# Patient Record
Sex: MALE | Race: WHITE | Employment: FULL TIME | URBAN - METROPOLITAN AREA
[De-identification: names, ages, dates, MRNs, and addresses within clinical notes are randomized per-mention and may not be internally consistent; named-entity substitution may affect disease eponyms.]

---

## 2017-04-13 ENCOUNTER — OFFICE VISIT (OUTPATIENT)
Dept: FAMILY MEDICINE | Facility: CLINIC | Age: 67
End: 2017-04-13
Payer: COMMERCIAL

## 2017-04-13 VITALS
WEIGHT: 250 LBS | DIASTOLIC BLOOD PRESSURE: 76 MMHG | HEIGHT: 69 IN | HEART RATE: 90 BPM | BODY MASS INDEX: 37.03 KG/M2 | SYSTOLIC BLOOD PRESSURE: 126 MMHG | TEMPERATURE: 97.4 F

## 2017-04-13 DIAGNOSIS — R60.0 SALIVARY GLAND SWELLING: Primary | ICD-10-CM

## 2017-04-13 PROCEDURE — 99213 OFFICE O/P EST LOW 20 MIN: CPT | Performed by: FAMILY MEDICINE

## 2017-04-13 NOTE — PROGRESS NOTES
SUBJECTIVE:                                                    Fco Arroyo is a 67 year old male who presents to clinic today for the following health issues:      MOUTH PROBLEM      Duration: Noticed 3-4 days ago, symptoms are getting worse.    Description (location/character/radiation): Right side mouth pain, not able to open his mouth as far as he normally can.  He states he does have a bad tooth on the bottom right side. He thinks he has an infected tooth on the right lower side. He has had several issues in that area.     Intensity:  moderate    Accompanying signs and symptoms: Pain radiating to the lower jaw, throat area.  Has noticed some chills, no fever.     History (similar episodes/previous evaluation): None    Precipitating or alleviating factors: Trying to open his mouth.    Therapies tried and outcome: Aleve as needed.  Nothing yet today.       Current Outpatient Prescriptions:      lisinopril (PRINIVIL,ZESTRIL) 40 MG tablet, Take 1 tablet (40 mg) by mouth daily, Disp: 90 tablet, Rfl: 3     hydrochlorothiazide (MICROZIDE) 12.5 MG capsule, Take 1 capsule (12.5 mg) by mouth every morning, Disp: 90 capsule, Rfl: 3     simvastatin (ZOCOR) 40 MG tablet, Take 1 tablet (40 mg) by mouth At Bedtime, Disp: 90 tablet, Rfl: 3     metFORMIN (GLUCOPHAGE-XR) 500 MG 24 hr tablet, Take 2 tablets (1,000 mg) by mouth 2 times daily (with meals), Disp: 120 tablet, Rfl: 3     glipiZIDE (GLIPIZIDE XL) 2.5 MG 24 hr tablet, Take 1 tablet (2.5 mg) by mouth daily, Disp: 90 tablet, Rfl: 3     blood glucose monitoring (ACCU-CHEK SMARTVIEW) test strip, Use to test blood sugar 2 times daily or as directed., Disp: 200 each, Rfl: 3     blood glucose (ACCU-CHEK SMARTVIEW) test strip, Use to test blood sugar 1 times daily or as directed., Disp: 50 strip, Rfl: 12     blood glucose monitoring (ACCU-CHEK FASTCLIX) lancets, 1 each daily Use to test blood sugar 1 times daily or as directed., Disp: 102 Box, Rfl: 12     cinnamon 500 MG  "CAPS, Take  by mouth., Disp: , Rfl:      VITAMIN C 500 MG PO TABS, 1 TABLET  DAILY, Disp: , Rfl:      VITAMIN E, daily, Disp: , Rfl:      ASPIRIN 81 MG OR TABS, 1 TABLET DAILY, Disp: , Rfl:     Patient Active Problem List   Diagnosis     R PLANTAR FIBROMATOSIS     Sensorineural hearing loss, bilateral     Hyperlipidemia LDL goal <100     Advanced directives, counseling/discussion     Morbid obesity (H)     Benign essential hypertension     Type 2 diabetes mellitus without complication, without long-term current use of insulin (H)       Blood pressure 126/76, pulse 90, temperature 97.4  F (36.3  C), temperature source Tympanic, height 5' 8.8\" (1.748 m), weight 250 lb (113.4 kg).    Exam:  GENERAL APPEARANCE: healthy, alert and no distress  EYES: EOMI,  PERRL  HENT: ear canals and TM's normal and nose and mouth without ulcers or lesions  HENT: the right submandibular gland is tender and swollen. The teeth and gums are not tender or swollen.   There are no swollen lymph nodes in the neck or under the jaw. The pharynx is not red, and there are no exudates.   The teeth are not tender to palpation or chewing on the tongue blade.   NECK: no adenopathy, no asymmetry, masses, or scars and thyroid normal to palpation  SKIN: no suspicious lesions or rashes  PSYCH: mentation appears normal and affect normal/bright      (K11.1) Salivary gland swelling  (primary encounter diagnosis)  Comment:   Plan: amoxicillin-clavulanate (AUGMENTIN) 875-125 MG         per tablet, OTOLARYNGOLOGY REFERRAL        There could be an infection on the right submandibular saliva gland. Use the fluids and mucus thinner, as in Mucinex.   Consider hot packs on the area for 1 minute three times daily. Then such on lemon drops for increased saliva to push a possible  Plug out. Take Augmentin at 875 mg twice daily for 10 days. Call if any side effects, or if not better. The ENT referral is done and call [phone] for the  appt if not better. Eat soft foods. " Tylenol and advil may be used.     Nathanael Andrew

## 2017-04-13 NOTE — MR AVS SNAPSHOT
After Visit Summary   4/13/2017    Fco Arroyo    MRN: [id number]           Patient Information     Date Of Birth          1950        Visit Information        Provider Department      4/13/2017 11:00 AM Nathanael Andrew MD [facility]        Today's Diagnoses     Salivary gland swelling    -  1      Care Instructions          Thank you for choosing [facility].  You may be receiving a survey in the mail from [facility] regarding your visit today.  Please take a few minutes to complete and return the survey to let us know how we are doing.      If you have questions or concerns, please contact us via [employer] or you can contact your care team at [phone].    Our Clinic hours are:  Monday 6:40 am  to 7:00 pm  Tuesday -Friday 6:40 am to 5:00 pm    The [place] outpatient lab hours are:  Monday - Friday 6:10 am to 4:45 pm  Saturdays 7:00 am to 11:00 am  Appointments are required, call [phone]    If you have clinical questions after hours or would like to schedule an appointment,  call the clinic at [phone].    (K11.1) Salivary gland swelling  (primary encounter diagnosis)  Comment:   Plan: amoxicillin-clavulanate (AUGMENTIN) 875-125 MG         per tablet, OTOLARYNGOLOGY REFERRAL        There could be an infection on the right submandibular saliva gland. Use the fluids and mucus thinner, as in Mucinex.   Consider hot packs on the area for 1 minute three times daily. Then such on lemon drops for increased saliva to push a possible  Plug out. Take Augmentin at 875 mg twice daily for 10 days. Call if any side effects, or if not better. The ENT referral is done and call [phone] for the  appt if not better. Eat soft foods. Tylenol and advil may be used.         Follow-ups after your visit        Additional Services     OTOLARYNGOLOGY REFERRAL       Your provider has referred you to: [facility]: [facility] [place] [phone]    "[email or web address]    Please be aware that coverage of these services is subject to the terms and limitations of your health insurance plan.  Call member services at your health plan with any benefit or coverage questions.      Please bring the following with you to your appointment:    (1) Any X-Rays, CTs or MRIs which have been performed.  Contact the facility where they were done to arrange for  prior to your scheduled appointment.   (2) List of current medications  (3) This referral request   (4) Any documents/labs given to you for this referral                  Who to contact     If you have questions or need follow up information about today's clinic visit or your schedule please contact [facility] directly at [phone].  Normal or non-critical lab and imaging results will be communicated to you by MyChart, letter or phone within 4 business days after the clinic has received the results. If you do not hear from us within 7 days, please contact the clinic through MyChart or phone. If you have a critical or abnormal lab result, we will notify you by phone as soon as possible.  Submit refill requests through [employer] or call your pharmacy and they will forward the refill request to us. Please allow 3 business days for your refill to be completed.          Additional Information About Your Visit        [employer] Information     [employer] lets you send messages to your doctor, view your test results, renew your prescriptions, schedule appointments and more. To sign up, go to [email or web address] . Click on \"Log in\" on the left side of the screen, which will take you to the Welcome page. Then click on \"Sign up Now\" on the right side of the page.     You will be asked to enter the access code listed below, as well as some personal information. Please follow the directions to create your username and password.     Your access code is: [email or web address]  Expires: 7/12/2017 12:09 " "PM     Your access code will  in 90 days. If you need help or a new code, please call your [facility] or [phone].        Care EveryWhere ID     This is your Care EveryWhere ID. This could be used by other organizations to access your [place] medical records  [id number]        Your Vitals Were     Pulse Temperature Height BMI (Body Mass Index)          90 97.4  F (36.3  C) (Tympanic) 5' 8.8\" (1.748 m) 37.13 kg/m2         Blood Pressure from Last 3 Encounters:   17 126/76   16 128/67   16 125/75    Weight from Last 3 Encounters:   17 250 lb (113.4 kg)   16 254 lb (115.2 kg)   12/07/15 256 lb (116.1 kg)              We Performed the Following     OTOLARYNGOLOGY REFERRAL          Today's Medication Changes          These changes are accurate as of: 17 12:09 PM.  If you have any questions, ask your nurse or doctor.               Start taking these medicines.        Dose/Directions    amoxicillin-clavulanate 875-125 MG per tablet   Commonly known as:  AUGMENTIN   Used for:  Salivary gland swelling   Started by:  Nathanael Andrew MD        Dose:  1 tablet   Take 1 tablet by mouth 2 times daily for 10 days   Quantity:  20 tablet   Refills:  0            Where to get your medicines      These medications were sent to [facility] [address] [place] [address] [address]   [address] [facility] [address]     Phone:  [phone]     amoxicillin-clavulanate 875-125 MG per tablet                Primary Care Provider Office Phone # Fax #    Virginie Lan [phone]       [facility] [address] [facility] [address]        Thank you!     Thank you for choosing [facility]  for your care. Our goal is always to provide you with excellent care. Hearing back from our patients is one way we can continue to improve our services. Please take a few minutes to complete the written survey that you may receive " in the mail after your visit with us. Thank you!             Your Updated Medication List - Protect others around you: Learn how to safely use, store and throw away your medicines at www.disposemymeds.org.          This list is accurate as of: 4/13/17 12:09 PM.  Always use your most recent med list.                   Brand Name Dispense Instructions for use    amoxicillin-clavulanate 875-125 MG per tablet    AUGMENTIN    20 tablet    Take 1 tablet by mouth 2 times daily for 10 days       ascorbic acid 500 MG tablet    VITAMIN C     1 TABLET  DAILY       aspirin 81 MG tablet      1 TABLET DAILY       blood glucose monitoring lancets     102 Box    1 each daily Use to test blood sugar 1 times daily or as directed.       * blood glucose monitoring test strip    ACCU-CHEK SMARTVIEW    50 strip    Use to test blood sugar 1 times daily or as directed.       * blood glucose monitoring test strip    ACCU-CHEK SMARTVIEW    200 each    Use to test blood sugar 2 times daily or as directed.       cinnamon 500 MG Caps      Take  by mouth.       glipiZIDE 2.5 MG 24 hr tablet    glipiZIDE XL    90 tablet    Take 1 tablet (2.5 mg) by mouth daily       hydrochlorothiazide 12.5 MG capsule    MICROZIDE    90 capsule    Take 1 capsule (12.5 mg) by mouth every morning       lisinopril 40 MG tablet    PRINIVIL/ZESTRIL    90 tablet    Take 1 tablet (40 mg) by mouth daily       metFORMIN 500 MG 24 hr tablet    GLUCOPHAGE-XR    120 tablet    Take 2 tablets (1,000 mg) by mouth 2 times daily (with meals)       simvastatin 40 MG tablet    ZOCOR    90 tablet    Take 1 tablet (40 mg) by mouth At Bedtime       VITAMIN E      daily       * Notice:  This list has 2 medication(s) that are the same as other medications prescribed for you. Read the directions carefully, and ask your doctor or other care provider to review them with you.

## 2017-04-13 NOTE — PATIENT INSTRUCTIONS
Thank you for choosing [facility].  You may be receiving a survey in the mail from [facility] regarding your visit today.  Please take a few minutes to complete and return the survey to let us know how we are doing.      If you have questions or concerns, please contact us via [employer] or you can contact your care team at [phone].    Our Clinic hours are:  Monday 6:40 am  to 7:00 pm  Tuesday -Friday 6:40 am to 5:00 pm    The [place] outpatient lab hours are:  Monday - Friday 6:10 am to 4:45 pm  Saturdays 7:00 am to 11:00 am  Appointments are required, call [phone]    If you have clinical questions after hours or would like to schedule an appointment,  call the clinic at [phone].    (K11.1) Salivary gland swelling  (primary encounter diagnosis)  Comment:   Plan: amoxicillin-clavulanate (AUGMENTIN) 875-125 MG         per tablet, OTOLARYNGOLOGY REFERRAL        There could be an infection on the right submandibular saliva gland. Use the fluids and mucus thinner, as in Mucinex.   Consider hot packs on the area for 1 minute three times daily. Then such on lemon drops for increased saliva to push a possible  Plug out. Take Augmentin at 875 mg twice daily for 10 days. Call if any side effects, or if not better. The ENT referral is done and call [phone] for the  appt if not better. Eat soft foods. Tylenol and advil may be used.

## 2017-04-13 NOTE — NURSING NOTE
"Chief Complaint   Patient presents with     Mouth Problem     Here with right side mouth pain, not able to open mouth as far.       Initial /76  Pulse 90  Temp 97.4  F (36.3  C) (Tympanic)  Ht 5' 8.8\" (1.748 m)  Wt 250 lb (113.4 kg)  BMI 37.13 kg/m2 Estimated body mass index is 37.13 kg/(m^2) as calculated from the following:    Height as of this encounter: 5' 8.8\" (1.748 m).    Weight as of this encounter: 250 lb (113.4 kg).  Medication Reconciliation: complete  "

## 2017-04-24 ENCOUNTER — OFFICE VISIT (OUTPATIENT)
Dept: OTOLARYNGOLOGY | Facility: CLINIC | Age: 67
End: 2017-04-24
Payer: COMMERCIAL

## 2017-04-24 VITALS — HEART RATE: 88 BPM | SYSTOLIC BLOOD PRESSURE: 150 MMHG | RESPIRATION RATE: 14 BRPM | DIASTOLIC BLOOD PRESSURE: 91 MMHG

## 2017-04-24 DIAGNOSIS — G50.0 FACIAL PAIN SYNDROME: Primary | ICD-10-CM

## 2017-04-24 PROCEDURE — 99203 OFFICE O/P NEW LOW 30 MIN: CPT | Performed by: OTOLARYNGOLOGY

## 2017-04-24 NOTE — PROGRESS NOTES
History of Present Illness - Fco Arroyo is a 67 year old male who presents with right mouth pain and difficulty opening his mouth widely. He feels that he does have some bad teeth on the right, but he has not discussed this with a dentist yet. Dr. Andrew felt that his right submandibular gland was swollen on 4/13/17, and put him on Augmentin.  He didn't really notice the swelling or any redness of the overlying skin, but was most bothered by the restriction of his jaw movement.    Past Medical History -   Patient Active Problem List   Diagnosis     R PLANTAR FIBROMATOSIS     Sensorineural hearing loss, bilateral     Hyperlipidemia LDL goal <100     Advanced directives, counseling/discussion     Morbid obesity (H)     Benign essential hypertension     Type 2 diabetes mellitus without complication, without long-term current use of insulin (H)       Current Medications -   Current Outpatient Prescriptions:      lisinopril (PRINIVIL,ZESTRIL) 40 MG tablet, Take 1 tablet (40 mg) by mouth daily, Disp: 90 tablet, Rfl: 3     hydrochlorothiazide (MICROZIDE) 12.5 MG capsule, Take 1 capsule (12.5 mg) by mouth every morning, Disp: 90 capsule, Rfl: 3     simvastatin (ZOCOR) 40 MG tablet, Take 1 tablet (40 mg) by mouth At Bedtime, Disp: 90 tablet, Rfl: 3     metFORMIN (GLUCOPHAGE-XR) 500 MG 24 hr tablet, Take 2 tablets (1,000 mg) by mouth 2 times daily (with meals), Disp: 120 tablet, Rfl: 3     glipiZIDE (GLIPIZIDE XL) 2.5 MG 24 hr tablet, Take 1 tablet (2.5 mg) by mouth daily, Disp: 90 tablet, Rfl: 3     blood glucose monitoring (ACCU-CHEK SMARTVIEW) test strip, Use to test blood sugar 2 times daily or as directed., Disp: 200 each, Rfl: 3     blood glucose (ACCU-CHEK SMARTVIEW) test strip, Use to test blood sugar 1 times daily or as directed., Disp: 50 strip, Rfl: 12     blood glucose monitoring (ACCU-CHEK FASTCLIX) lancets, 1 each daily Use to test blood sugar 1 times daily or as directed., Disp: 102 Box, Rfl: 12      cinnamon 500 MG CAPS, Take  by mouth., Disp: , Rfl:      VITAMIN C 500 MG PO TABS, 1 TABLET  DAILY, Disp: , Rfl:      VITAMIN E, daily, Disp: , Rfl:      ASPIRIN 81 MG OR TABS, 1 TABLET DAILY, Disp: , Rfl:     Allergies -   Allergies   Allergen Reactions     Morphine GI Disturbance       Social History -   Social History     Social History     Marital status:      Spouse name: N/A     Number of children: N/A     Years of education: N/A     Social History Main Topics     Smoking status: Former Smoker     Types: Cigarettes     Smokeless tobacco: Never Used     Alcohol use Yes      Comment: little     Drug use: No     Sexual activity: Not on file     Other Topics Concern     Parent/Sibling W/ Cabg, Mi Or Angioplasty Before 65f 55m? Yes     Social History Narrative       Family History -   Family History   Problem Relation Age of Onset     DIABETES Mother      Hypertension Mother      Hypertension Father      HEART DISEASE Brother      MI x2, CABG, 1st MI at 49-51 yo.      Hypertension Brother      C.A.D. Brother      Alzheimer Disease Brother      DIABETES Sister       maybe       Review of Systems - As per HPI and PMHx, otherwise 7 system review of the head and neck negative. 10+ system review negative.    Physical Exam  BP (!) 150/91 (BP Location: Right arm, Patient Position: Chair, Cuff Size: Adult Large)  Pulse 88  Resp 14  General - The patient is well nourished and well developed, and appears to have good nutritional status.  Alert and oriented to person and place, answers questions and cooperates with examination appropriately.   Head and Face - Normocephalic and atraumatic, with no gross asymmetry noted of the contour of the facial features.  The facial nerve is intact, with strong symmetric movements.  Voice and Breathing - The patient was breathing comfortably without the use of accessory muscles. There was no wheezing, stridor, or stertor.  The patients voice was clear and strong, and had  appropriate pitch and quality.  Ears - Bilateral pinna and EACs with normal appearing overlying skin. Tympanic membrane intact with good mobility on pneumatic otoscopy bilaterally. Bony landmarks of the ossicular chain are normal. The tympanic membranes are normal in appearance. No retraction, perforation, or masses.  No fluid or purulence was seen in the external canal or the middle ear.   Eyes - Extraocular movements intact.  Sclera were not icteric or injected, conjunctiva were pink and moist.  Mouth - Examination of the oral cavity showed pink, healthy oral mucosa. No pus or palpable stone in either submandibular duct. Poor dentition. Mildly more tender to palpation of the right TMJ.  Throat - The walls of the oropharynx were smooth, pink, moist, symmetric, and had no lesions or ulcerations.  The tonsillar pillars and soft palate were symmetric.  The uvula was midline on elevation.  Neck - Normal midline excursion of the laryngotracheal complex during swallowing.  Full range of motion on passive movement.  Palpation of the occipital, submental, submandibular, internal jugular chain, and supraclavicular nodes did not demonstrate any abnormal lymph nodes or masses.  The carotid pulse was palpable bilaterally.  Palpation of the thyroid was soft and smooth, with no nodules or goiter appreciated.  The trachea was mobile and midline.  Nose - External contour is symmetric, no gross deflection or scars.  Nasal mucosa is pink and moist with no abnormal mucus.  The septum was midline and non-obstructive, turbinates of normal size and position.  No polyps, masses, or purulence noted on examination.          Assessment - Fco Arroyo is a 67 year old male with normal salivary glands on exam today. Given the lack of notable swelling by the patient, I suspect this is likely a consequence of TMJ rather than sialadenitis. I recommended he f/u with a dentist to have his poor dentition evaluated, as well as consider options  to manage future TMJ symptoms.       Dr. Gary Weaver MD  Otolaryngology  [facility]

## 2017-05-10 ENCOUNTER — OFFICE VISIT (OUTPATIENT)
Dept: FAMILY MEDICINE | Facility: CLINIC | Age: 67
End: 2017-05-10
Payer: COMMERCIAL

## 2017-05-10 ENCOUNTER — TELEPHONE (OUTPATIENT)
Dept: FAMILY MEDICINE | Facility: CLINIC | Age: 67
End: 2017-05-10

## 2017-05-10 VITALS
TEMPERATURE: 97.6 F | WEIGHT: 249 LBS | BODY MASS INDEX: 36.88 KG/M2 | DIASTOLIC BLOOD PRESSURE: 74 MMHG | SYSTOLIC BLOOD PRESSURE: 120 MMHG | HEIGHT: 69 IN

## 2017-05-10 DIAGNOSIS — E11.9 TYPE 2 DIABETES MELLITUS WITHOUT COMPLICATION, WITHOUT LONG-TERM CURRENT USE OF INSULIN (H): ICD-10-CM

## 2017-05-10 DIAGNOSIS — R42 DIZZINESS: Primary | ICD-10-CM

## 2017-05-10 LAB
ANION GAP SERPL CALCULATED.3IONS-SCNC: 9 MMOL/L (ref 3–14)
BUN SERPL-MCNC: 15 MG/DL (ref 7–30)
CALCIUM SERPL-MCNC: 9.6 MG/DL (ref 8.5–10.1)
CHLORIDE SERPL-SCNC: 100 MMOL/L (ref 94–109)
CO2 SERPL-SCNC: 30 MMOL/L (ref 20–32)
CREAT SERPL-MCNC: 0.87 MG/DL (ref 0.66–1.25)
CREAT UR-MCNC: 98 MG/DL
ERYTHROCYTE [DISTWIDTH] IN BLOOD BY AUTOMATED COUNT: 13.3 % (ref 10–15)
GFR SERPL CREATININE-BSD FRML MDRD: 87 ML/MIN/1.7M2
GLUCOSE SERPL-MCNC: 179 MG/DL (ref 70–99)
HBA1C MFR BLD: 6.6 % (ref 4.3–6)
HCT VFR BLD AUTO: 47.5 % (ref 40–53)
HGB BLD-MCNC: 16.5 G/DL (ref 13.3–17.7)
MCH RBC QN AUTO: 31.7 PG (ref 26.5–33)
MCHC RBC AUTO-ENTMCNC: 34.7 G/DL (ref 31.5–36.5)
MCV RBC AUTO: 91 FL (ref 78–100)
MICROALBUMIN UR-MCNC: 6 MG/L
MICROALBUMIN/CREAT UR: 6.41 MG/G CR (ref 0–17)
PLATELET # BLD AUTO: 188 10E9/L (ref 150–450)
POTASSIUM SERPL-SCNC: 4.2 MMOL/L (ref 3.4–5.3)
RBC # BLD AUTO: 5.2 10E12/L (ref 4.4–5.9)
SODIUM SERPL-SCNC: 139 MMOL/L (ref 133–144)
WBC # BLD AUTO: 7.4 10E9/L (ref 4–11)

## 2017-05-10 PROCEDURE — 36415 COLL VENOUS BLD VENIPUNCTURE: CPT | Performed by: FAMILY MEDICINE

## 2017-05-10 PROCEDURE — 80048 BASIC METABOLIC PNL TOTAL CA: CPT | Performed by: FAMILY MEDICINE

## 2017-05-10 PROCEDURE — 83036 HEMOGLOBIN GLYCOSYLATED A1C: CPT | Performed by: FAMILY MEDICINE

## 2017-05-10 PROCEDURE — 82043 UR ALBUMIN QUANTITATIVE: CPT | Performed by: FAMILY MEDICINE

## 2017-05-10 PROCEDURE — 99214 OFFICE O/P EST MOD 30 MIN: CPT | Mod: 25 | Performed by: FAMILY MEDICINE

## 2017-05-10 PROCEDURE — 85027 COMPLETE CBC AUTOMATED: CPT | Performed by: FAMILY MEDICINE

## 2017-05-10 RX ORDER — ONDANSETRON 4 MG/1
4 TABLET, FILM COATED ORAL EVERY 8 HOURS PRN
Qty: 18 TABLET | Refills: 0 | Status: SHIPPED | OUTPATIENT
Start: 2017-05-10 | End: 2017-12-28

## 2017-05-10 RX ORDER — MECLIZINE HYDROCHLORIDE 25 MG/1
25 TABLET ORAL EVERY 6 HOURS PRN
Qty: 30 TABLET | Refills: 1 | Status: SHIPPED | OUTPATIENT
Start: 2017-05-10 | End: 2017-12-28

## 2017-05-10 NOTE — PROGRESS NOTES
Please inform patient that test result was within normal parameters.  His A1C remained unchanged from last check. All his other labs were within normal limits.  Thank you.     Virginie Lan M.D.

## 2017-05-10 NOTE — PROGRESS NOTES
SUBJECTIVE:                                                    Fco Arroyo is a 67 year old male who presents to clinic today for the following health issues:      Dizziness     Onset: 1 week    Description:   Do you feel faint:  no   Does it feel like the surroundings (bed, room) are moving: YES  Unsteady/off balance: YES  Have you passed out or fallen: no     Intensity: moderate    Progression of Symptoms:  worsening and constant    Accompanying Signs & Symptoms:  Heart palpitations: no   Nausea, vomiting: YES- nausea  Weakness in arms or legs: Yes, Legs  Fatigue: no   Vision or speech changes: no   Ringing in ears (Tinnitus): no   Hearing Loss: no    History:   Head trauma/concussion hx: no   Previous similar symptoms: no   Recent bleeding history: no     Precipitating factors:   Worse with activity or head movement: YES  Any new medications (BP?): Pt takes BP medication but it has not changed.  Alcohol/drug abuse/withdrawal: no     Alleviating factors:   Does staying in a fixed position give relief:  YES       Therapies Tried and outcome: None      History as above. Patient reports that he has had some dizziness , first incidence happened last week , he says once he got out of bed he started feeling like his balance was off . He had no chest pain or shortness of breath. He denies any eye symptoms. No headaches or feeling light headed he seemed to do okay that day and was fine for the next two days. Symptoms returned again today and he says he feels like his balance his off again. He denies any headaches today. Moving his  or changing position tends to aggravate this.No relieving factors. He denies any sinus pressure or nasal congestion .    Problem list and histories reviewed & adjusted, as indicated.  Additional history: as documented    Patient Active Problem List   Diagnosis     R PLANTAR FIBROMATOSIS     Sensorineural hearing loss, bilateral     Hyperlipidemia LDL goal <100     Advanced directives,  counseling/discussion     Morbid obesity (H)     Benign essential hypertension     Type 2 diabetes mellitus without complication, without long-term current use of insulin (H)     Past Surgical History:   Procedure Laterality Date     SURGICAL HISTORY OF -   1998    open cholecystectomy for gallstones s/p ERCP     SURGICAL HISTORY OF -   1997    cystoscopy     SURGICAL HISTORY OF -   11/1997    Urethral stricture       Social History   Substance Use Topics     Smoking status: Former Smoker     Types: Cigarettes     Smokeless tobacco: Never Used     Alcohol use Yes      Comment: little     Family History   Problem Relation Age of Onset     DIABETES Mother      Hypertension Mother      Hypertension Father      HEART DISEASE Brother      MI x2, CABG, 1st MI at 49-49 yo.      Hypertension Brother      C.A.D. Brother      Alzheimer Disease Brother      DIABETES Sister       maybe         Current Outpatient Prescriptions   Medication Sig Dispense Refill     ondansetron (ZOFRAN) 4 MG tablet Take 1 tablet (4 mg) by mouth every 8 hours as needed 18 tablet 0     meclizine (ANTIVERT) 25 MG tablet Take 1 tablet (25 mg) by mouth every 6 hours as needed for dizziness 30 tablet 1     lisinopril (PRINIVIL,ZESTRIL) 40 MG tablet Take 1 tablet (40 mg) by mouth daily 90 tablet 3     hydrochlorothiazide (MICROZIDE) 12.5 MG capsule Take 1 capsule (12.5 mg) by mouth every morning 90 capsule 3     simvastatin (ZOCOR) 40 MG tablet Take 1 tablet (40 mg) by mouth At Bedtime 90 tablet 3     metFORMIN (GLUCOPHAGE-XR) 500 MG 24 hr tablet Take 2 tablets (1,000 mg) by mouth 2 times daily (with meals) 120 tablet 3     glipiZIDE (GLIPIZIDE XL) 2.5 MG 24 hr tablet Take 1 tablet (2.5 mg) by mouth daily 90 tablet 3     blood glucose monitoring (ACCU-CHEK SMARTVIEW) test strip Use to test blood sugar 2 times daily or as directed. 200 each 3     blood glucose (ACCU-CHEK SMARTVIEW) test strip Use to test blood sugar 1 times daily or as directed. 50 strip  "12     blood glucose monitoring (ACCU-CHEK FASTCLIX) lancets 1 each daily Use to test blood sugar 1 times daily or as directed. 102 Box 12     cinnamon 500 MG CAPS Take  by mouth.       VITAMIN C 500 MG PO TABS 1 TABLET  DAILY       VITAMIN E daily       ASPIRIN 81 MG OR TABS 1 TABLET DAILY       Allergies   Allergen Reactions     Morphine GI Disturbance       Reviewed and updated as needed this visit by clinical staff       Reviewed and updated as needed this visit by Provider         ROS:  Constitutional, HEENT, cardiovascular, pulmonary, gi and gu systems are negative, except as otherwise noted.    OBJECTIVE:                                                    /74  Temp 97.6  F (36.4  C) (Tympanic)  Ht 5' 8.5\" (1.74 m)  Wt 249 lb (112.9 kg)  BMI 37.31 kg/m2  Body mass index is 37.31 kg/(m^2).  GENERAL: healthy, alert and no distress  EYES: Eyes grossly normal to inspection, PERRL and conjunctivae and sclerae normal  HENT: ear canals and TM's normal, nose and mouth without ulcers or lesions  NECK: no adenopathy, no asymmetry, masses, or scars and thyroid normal to palpation  RESP: lungs clear to auscultation - no rales, rhonchi or wheezes  CV: regular rate and rhythm, normal S1 S2, no S3 or S4, no murmur, click or rub, no peripheral edema and peripheral pulses strong  MS: no gross musculoskeletal defects noted, no edema  NEURO: Normal strength and tone, sensory exam grossly normal, mentation intact, speech normal, cranial nerves 2-12 intact and gait abnormal:     Diagnostic Test Results:  Results for orders placed or performed in visit on 05/10/17   CBC with platelets   Result Value Ref Range    WBC 7.4 4.0 - 11.0 10e9/L    RBC Count 5.20 4.4 - 5.9 10e12/L    Hemoglobin 16.5 13.3 - 17.7 g/dL    Hematocrit 47.5 40.0 - 53.0 %    MCV 91 78 - 100 fl    MCH 31.7 26.5 - 33.0 pg    MCHC 34.7 31.5 - 36.5 g/dL    RDW 13.3 10.0 - 15.0 %    Platelet Count 188 150 - 450 10e9/L   Basic metabolic panel   Result Value " Ref Range    Sodium 139 133 - 144 mmol/L    Potassium 4.2 3.4 - 5.3 mmol/L    Chloride 100 94 - 109 mmol/L    Carbon Dioxide 30 20 - 32 mmol/L    Anion Gap 9 3 - 14 mmol/L    Glucose 179 (H) 70 - 99 mg/dL    Urea Nitrogen 15 7 - 30 mg/dL    Creatinine 0.87 0.66 - 1.25 mg/dL    GFR Estimate 87 >60 mL/min/1.7m2    GFR Estimate If Black >90   GFR Calc   >60 mL/min/1.7m2    Calcium 9.6 8.5 - 10.1 mg/dL   Hemoglobin A1c   Result Value Ref Range    Hemoglobin A1C 6.6 (H) 4.3 - 6.0 %   Albumin Random Urine Quantitative   Result Value Ref Range    Creatinine Urine 98 mg/dL    Albumin Urine mg/L 6 mg/L    Albumin Urine mg/g Cr 6.41 0 - 17 mg/g Cr        ASSESSMENT/PLAN:                                                    (R42) Dizziness  (primary encounter diagnosis)  Comment: Discussed causes of dizziness with patient asked that he try the medication if no improvement may need physical therapy appointment .   Plan: ondansetron (ZOFRAN) 4 MG tablet, meclizine         (ANTIVERT) 25 MG tablet, CBC with platelets,         Basic metabolic panel      (E11.9) Type 2 diabetes mellitus without complication, without long-term current use of insulin (H)  Comment: A1C is still the dame  Plan: Hemoglobin A1c, Albumin Random Urine         Quantitative         FUTURE APPOINTMENTS:       - Follow-up visit as needed.    Virginie Lan MD  [facility]

## 2017-05-10 NOTE — MR AVS SNAPSHOT
After Visit Summary   5/10/2017    Fco Arroyo    MRN: [id number]           Patient Information     Date Of Birth          1950        Visit Information        Provider Department      5/10/2017 11:20 AM Virginie Lan MD [facility]        Today's Diagnoses     Dizziness    -  1      Care Instructions          Thank you for choosing [facility].  You may be receiving a survey in the mail from [facility] regarding your visit today.  Please take a few minutes to complete and return the survey to let us know how we are doing.      If you have questions or concerns, please contact us via [employer] or you can contact your care team at [phone].    Our Clinic hours are:  Monday 6:40 am  to 7:00 pm  Tuesday -Friday 6:40 am to 5:00 pm    The [place] outpatient lab hours are:  Monday - Friday 6:10 am to 4:45 pm  Saturdays 7:00 am to 11:00 am  Appointments are required, call [phone]    If you have clinical questions after hours or would like to schedule an appointment,  call the clinic at [phone].    Managing Dizziness (Vertigo) with Medications  Although medications can't cure your problem, they can help control symptoms. Your doctor may prescribe medications for a few weeks and then taper them off.  If you have side effects from your medications, contact your healthcare provider right away.   How medications can help      Treat infection or inflammation. If you have a bacterial infection, your doctor can prescribe antibiotics.    Limit conflicting balance signals. These medications are often in pill form.    Ease nausea. Suppositories, pills, or shots may be used to reduce vomiting.    Reduce pressure in the canals. Diuretics can be used to treat Meniere's disease. These medications help rid the body of excess fluid.    Ease other symptoms. Other medications can help ease depression and anxiety caused by living with dizziness or fainting.    8246-7908 The  "[employer]. [address] [place] [address], [place] [address]. All rights reserved. This information is not intended as a substitute for professional medical care. Always follow your healthcare professional's instructions.              Follow-ups after your visit        Who to contact     If you have questions or need follow up information about today's clinic visit or your schedule please contact [facility] directly at [phone].  Normal or non-critical lab and imaging results will be communicated to you by [employer], letter or phone within 4 business days after the clinic has received the results. If you do not hear from us within 7 days, please contact the clinic through [employer] or phone. If you have a critical or abnormal lab result, we will notify you by phone as soon as possible.  Submit refill requests through [employer] or call your pharmacy and they will forward the refill request to us. Please allow 3 business days for your refill to be completed.          Additional Information About Your Visit        [employer] Information     [employer] lets you send messages to your doctor, view your test results, renew your prescriptions, schedule appointments and more. To sign up, go to [email or web address] . Click on \"Log in\" on the left side of the screen, which will take you to the Welcome page. Then click on \"Sign up Now\" on the right side of the page.     You will be asked to enter the access code listed below, as well as some personal information. Please follow the directions to create your username and password.     Your access code is: [email or web address]  Expires: 2017 12:09 PM     Your access code will  in 90 days. If you need help or a new code, please call your [facility] or [phone].        Care EveryWhere ID     This is your Care EveryWhere ID. This could be used by other organizations to access your [facility] medical records  [id number]        Your Vitals Were     Temperature " "Height BMI (Body Mass Index)             97.6  F (36.4  C) (Tympanic) 5' 8.5\" (1.74 m) 37.31 kg/m2          Blood Pressure from Last 3 Encounters:   05/10/17 146/70   04/24/17 (!) 150/91   04/13/17 126/76    Weight from Last 3 Encounters:   05/10/17 249 lb (112.9 kg)   04/13/17 250 lb (113.4 kg)   11/16/16 254 lb (115.2 kg)              We Performed the Following     Basic metabolic panel     CBC with platelets          Today's Medication Changes          These changes are accurate as of: 5/10/17 11:41 AM.  If you have any questions, ask your nurse or doctor.               Start taking these medicines.        Dose/Directions    meclizine 25 MG tablet   Commonly known as:  ANTIVERT   Used for:  Dizziness   Started by:  Virginie Lan MD        Dose:  25 mg   Take 1 tablet (25 mg) by mouth every 6 hours as needed for dizziness   Quantity:  30 tablet   Refills:  1       ondansetron 4 MG tablet   Commonly known as:  ZOFRAN   Used for:  Dizziness   Started by:  Virginie Lan MD        Dose:  4 mg   Take 1 tablet (4 mg) by mouth every 8 hours as needed   Quantity:  18 tablet   Refills:  0            Where to get your medicines      These medications were sent to [facility] [place], [place] - [address]   [address] [facility] [address]     Phone:  [phone]     meclizine 25 MG tablet    ondansetron 4 MG tablet                Primary Care Provider Office Phone # Fax #    Virginie Lan [phone]       [facility] [address] [address]        Thank you!     Thank you for choosing [facility]  for your care. Our goal is always to provide you with excellent care. Hearing back from our patients is one way we can continue to improve our services. Please take a few minutes to complete the written survey that you may receive in the mail after your visit with us. Thank you!             Your Updated Medication " List - Protect others around you: Learn how to safely use, store and throw away your medicines at www.disposemymeds.org.          This list is accurate as of: 5/10/17 11:41 AM.  Always use your most recent med list.                   Brand Name Dispense Instructions for use    ascorbic acid 500 MG tablet    VITAMIN C     1 TABLET  DAILY       aspirin 81 MG tablet      1 TABLET DAILY       blood glucose monitoring lancets     102 Box    1 each daily Use to test blood sugar 1 times daily or as directed.       * blood glucose monitoring test strip    ACCU-CHEK SMARTVIEW    50 strip    Use to test blood sugar 1 times daily or as directed.       * blood glucose monitoring test strip    ACCU-CHEK SMARTVIEW    200 each    Use to test blood sugar 2 times daily or as directed.       cinnamon 500 MG Caps      Take  by mouth.       glipiZIDE 2.5 MG 24 hr tablet    glipiZIDE XL    90 tablet    Take 1 tablet (2.5 mg) by mouth daily       hydrochlorothiazide 12.5 MG capsule    MICROZIDE    90 capsule    Take 1 capsule (12.5 mg) by mouth every morning       lisinopril 40 MG tablet    PRINIVIL/ZESTRIL    90 tablet    Take 1 tablet (40 mg) by mouth daily       meclizine 25 MG tablet    ANTIVERT    30 tablet    Take 1 tablet (25 mg) by mouth every 6 hours as needed for dizziness       metFORMIN 500 MG 24 hr tablet    GLUCOPHAGE-XR    120 tablet    Take 2 tablets (1,000 mg) by mouth 2 times daily (with meals)       ondansetron 4 MG tablet    ZOFRAN    18 tablet    Take 1 tablet (4 mg) by mouth every 8 hours as needed       simvastatin 40 MG tablet    ZOCOR    90 tablet    Take 1 tablet (40 mg) by mouth At Bedtime       VITAMIN E      daily       * Notice:  This list has 2 medication(s) that are the same as other medications prescribed for you. Read the directions carefully, and ask your doctor or other care provider to review them with you.

## 2017-05-10 NOTE — LETTER
[facility]  [address] [facility] [address]  Phone: [phone]    May 10, 2017    Fco Arroyo  [address] [facility] [address]          Dear Mr. Arroyo,    The results of your recent lab tests were: .within normal parameters.  His A1C remained unchanged from last check. All his other labs were within normal limits.  Enclosed is a copy of these results.  If you have any further questions or problems, please contact our office.    Sincerely,      Virginie Lan MD / cb

## 2017-05-10 NOTE — NURSING NOTE
"Chief Complaint   Patient presents with     Dizziness       Initial /70 (BP Location: Right arm, Patient Position: Dangled, Cuff Size: Adult Regular)  Temp 97.6  F (36.4  C) (Tympanic)  Ht 5' 8.5\" (1.74 m)  Wt 249 lb (112.9 kg)  BMI 37.31 kg/m2 Estimated body mass index is 37.31 kg/(m^2) as calculated from the following:    Height as of this encounter: 5' 8.5\" (1.74 m).    Weight as of this encounter: 249 lb (112.9 kg).  Medication Reconciliation: complete    "

## 2017-05-10 NOTE — PATIENT INSTRUCTIONS
Thank you for choosing [facility].  You may be receiving a survey in the mail from [facility] regarding your visit today.  Please take a few minutes to complete and return the survey to let us know how we are doing.      If you have questions or concerns, please contact us via [employer] or you can contact your care team at [phone].    Our Clinic hours are:  Monday 6:40 am  to 7:00 pm  Tuesday -Friday 6:40 am to 5:00 pm    The [place] outpatient lab hours are:  Monday - Friday 6:10 am to 4:45 pm  Saturdays 7:00 am to 11:00 am  Appointments are required, call [phone]    If you have clinical questions after hours or would like to schedule an appointment,  call the clinic at [phone].    Managing Dizziness (Vertigo) with Medications  Although medications can't cure your problem, they can help control symptoms. Your doctor may prescribe medications for a few weeks and then taper them off.  If you have side effects from your medications, contact your healthcare provider right away.   How medications can help      Treat infection or inflammation. If you have a bacterial infection, your doctor can prescribe antibiotics.    Limit conflicting balance signals. These medications are often in pill form.    Ease nausea. Suppositories, pills, or shots may be used to reduce vomiting.    Reduce pressure in the canals. Diuretics can be used to treat Meniere's disease. These medications help rid the body of excess fluid.    Ease other symptoms. Other medications can help ease depression and anxiety caused by living with dizziness or fainting.    3114-8166 [employer]. [address] [place] [address], [place] [address]. All rights reserved. This information is not intended as a substitute for professional medical care. Always follow your healthcare professional's instructions.

## 2017-05-11 NOTE — TELEPHONE ENCOUNTER
PA for zofran completed at cover my med and faxed to [employer] - will await response

## 2017-05-15 NOTE — TELEPHONE ENCOUNTER
PA for zofran has been denied.  Medicare BLUE (note not Medicare Med[employer]) does not approve diagnosis.  Pharmacy notified.

## 2017-05-24 ENCOUNTER — OFFICE VISIT (OUTPATIENT)
Dept: FAMILY MEDICINE | Facility: CLINIC | Age: 67
End: 2017-05-24
Payer: COMMERCIAL

## 2017-05-24 VITALS
HEART RATE: 81 BPM | BODY MASS INDEX: 37.18 KG/M2 | WEIGHT: 251 LBS | HEIGHT: 69 IN | DIASTOLIC BLOOD PRESSURE: 75 MMHG | TEMPERATURE: 97.6 F | SYSTOLIC BLOOD PRESSURE: 125 MMHG

## 2017-05-24 DIAGNOSIS — E11.9 TYPE 2 DIABETES MELLITUS WITHOUT COMPLICATION, WITHOUT LONG-TERM CURRENT USE OF INSULIN (H): ICD-10-CM

## 2017-05-24 PROCEDURE — 99207 C FOOT EXAM  NO CHARGE: CPT | Performed by: FAMILY MEDICINE

## 2017-05-24 PROCEDURE — 99213 OFFICE O/P EST LOW 20 MIN: CPT | Performed by: FAMILY MEDICINE

## 2017-05-24 RX ORDER — METFORMIN HCL 500 MG
1000 TABLET, EXTENDED RELEASE 24 HR ORAL 2 TIMES DAILY WITH MEALS
Qty: 120 TABLET | Refills: 2 | Status: CANCELLED | OUTPATIENT
Start: 2017-05-24 | End: 2017-08-22

## 2017-05-24 NOTE — PROGRESS NOTES
SUBJECTIVE:                                                    Fco Arroyo is a 67 year old male who presents to clinic today for the following health issues:      Diabetes Follow-up      Patient is checking blood sugars: not at all    Diabetic concerns: None     Symptoms of hypoglycemia (low blood sugar): none     Paresthesias (numbness or burning in feet) or sores: No     Date of last diabetic eye exam: due        Amount of exercise or physical activity: some     Problems taking medications regularly: No    Medication side effects: none    Diet: carbohydrate counting patient tries his best     Medication Followup of metformin     Taking Medication as prescribed: yes    Side Effects:  None    Medication Helping Symptoms:  yes     Patient is a 67 yr old male here for his diabetes check. His last A1C was 6.6 and it has remained unchanged from the last time. All his other labs were also within normal limits. No acute complaints today. He is in need of refills of his metformin .    Problem list and histories reviewed & adjusted, as indicated.  Additional history: as documented    Patient Active Problem List   Diagnosis     R PLANTAR FIBROMATOSIS     Sensorineural hearing loss, bilateral     Hyperlipidemia LDL goal <100     Advanced directives, counseling/discussion     Morbid obesity (H)     Benign essential hypertension     Type 2 diabetes mellitus without complication, without long-term current use of insulin (H)     Past Surgical History:   Procedure Laterality Date     SURGICAL HISTORY OF -   1998    open cholecystectomy for gallstones s/p ERCP     SURGICAL HISTORY OF -   1997    cystoscopy     SURGICAL HISTORY OF -   11/1997    Urethral stricture       Social History   Substance Use Topics     Smoking status: Former Smoker     Types: Cigarettes     Smokeless tobacco: Never Used     Alcohol use Yes      Comment: little     Family History   Problem Relation Age of Onset     DIABETES Mother      Hypertension  Mother      Hypertension Father      HEART DISEASE Brother      MI x2, CABG, 1st MI at 49-49 yo.      Hypertension Brother      C.A.D. Brother      Alzheimer Disease Brother      DIABETES Sister       maybe         Current Outpatient Prescriptions   Medication Sig Dispense Refill     metFORMIN (GLUCOPHAGE) 1000 MG tablet Take 1 tablet (1,000 mg) by mouth 2 times daily (with meals) 180 tablet 3     lisinopril (PRINIVIL,ZESTRIL) 40 MG tablet Take 1 tablet (40 mg) by mouth daily 90 tablet 3     hydrochlorothiazide (MICROZIDE) 12.5 MG capsule Take 1 capsule (12.5 mg) by mouth every morning 90 capsule 3     simvastatin (ZOCOR) 40 MG tablet Take 1 tablet (40 mg) by mouth At Bedtime 90 tablet 3     glipiZIDE (GLIPIZIDE XL) 2.5 MG 24 hr tablet Take 1 tablet (2.5 mg) by mouth daily 90 tablet 3     blood glucose monitoring (ACCU-CHEK SMARTVIEW) test strip Use to test blood sugar 2 times daily or as directed. 200 each 3     blood glucose monitoring (ACCU-CHEK FASTCLIX) lancets 1 each daily Use to test blood sugar 1 times daily or as directed. 102 [address]     cinnamon 500 MG CAPS Take  by mouth.       VITAMIN C 500 MG PO TABS 1 TABLET  DAILY       VITAMIN E daily       ASPIRIN 81 MG OR TABS 1 TABLET DAILY       ondansetron (ZOFRAN) 4 MG tablet Take 1 tablet (4 mg) by mouth every 8 hours as needed 18 tablet 0     meclizine (ANTIVERT) 25 MG tablet Take 1 tablet (25 mg) by mouth every 6 hours as needed for dizziness 30 tablet 1     [DISCONTINUED] metFORMIN (GLUCOPHAGE-XR) 500 MG 24 hr tablet Take 2 tablets (1,000 mg) by mouth 2 times daily (with meals) 120 tablet 3     blood glucose (ACCU-CHEK SMARTVIEW) test strip Use to test blood sugar 1 times daily or as directed. 50 strip 12     Allergies   Allergen Reactions     Morphine GI Disturbance       Reviewed and updated as needed this visit by clinical staff       Reviewed and updated as needed this visit by Provider         ROS:  Constitutional, HEENT, cardiovascular, pulmonary, gi  "and gu systems are negative, except as otherwise noted.    OBJECTIVE:                                                    /75  Pulse 81  Temp 97.6  F (36.4  C) (Tympanic)  Ht 5' 8.5\" (1.74 m)  Wt 251 lb (113.9 kg)  BMI 37.61 kg/m2  Body mass index is 37.61 kg/(m^2).  GENERAL: healthy, alert and no distress  NECK: no adenopathy, no asymmetry, masses, or scars and thyroid normal to palpation  RESP: lungs clear to auscultation - no rales, rhonchi or wheezes  CV: regular rate and rhythm, normal S1 S2, no S3 or S4, no murmur, click or rub, no peripheral edema and peripheral pulses strong  ABDOMEN: soft, nontender, no hepatosplenomegaly, no masses and bowel sounds normal  MS: no gross musculoskeletal defects noted, no edema  Diabetic foot exam: normal DP and PT pulses, no trophic changes or ulcerative lesions and normal sensory exam    Diagnostic Test Results:  Results for orders placed or performed in visit on 05/10/17   CBC with platelets   Result Value Ref Range    WBC 7.4 4.0 - 11.0 10e9/L    RBC Count 5.20 4.4 - 5.9 10e12/L    Hemoglobin 16.5 13.3 - 17.7 g/dL    Hematocrit 47.5 40.0 - 53.0 %    MCV 91 78 - 100 fl    MCH 31.7 26.5 - 33.0 pg    MCHC 34.7 31.5 - 36.5 g/dL    RDW 13.3 10.0 - 15.0 %    Platelet Count 188 150 - 450 10e9/L   Basic metabolic panel   Result Value Ref Range    Sodium 139 133 - 144 mmol/L    Potassium 4.2 3.4 - 5.3 mmol/L    Chloride 100 94 - 109 mmol/L    Carbon Dioxide 30 20 - 32 mmol/L    Anion Gap 9 3 - 14 mmol/L    Glucose 179 (H) 70 - 99 mg/dL    Urea Nitrogen 15 7 - 30 mg/dL    Creatinine 0.87 0.66 - 1.25 mg/dL    GFR Estimate 87 >60 mL/min/1.7m2    GFR Estimate If Black >90   GFR Calc   >60 mL/min/1.7m2    Calcium 9.6 8.5 - 10.1 mg/dL   Hemoglobin A1c   Result Value Ref Range    Hemoglobin A1C 6.6 (H) 4.3 - 6.0 %   Albumin Random Urine Quantitative   Result Value Ref Range    Creatinine Urine 98 mg/dL    Albumin Urine mg/L 6 mg/L    Albumin Urine mg/g Cr 6.41 0 " - 17 mg/g Cr        ASSESSMENT/PLAN:                                                        (E11.9) Type 2 diabetes mellitus without complication, without long-term current use of insulin (H)  Comment: Medication refilled. Return in six months for a recheck.   Plan: FOOT EXAM, metFORMIN (GLUCOPHAGE) 1000 MG         tablet              FUTURE APPOINTMENTS:       - Follow-up visit as needed.    Virginie Lan MD  [facility]

## 2017-05-24 NOTE — NURSING NOTE
"Chief Complaint   Patient presents with     Diabetes     Refill Request       Initial /75  Pulse 81  Temp 97.6  F (36.4  C) (Tympanic)  Ht 5' 8.5\" (1.74 m)  Wt 251 lb (113.9 kg)  BMI 37.61 kg/m2 Estimated body mass index is 37.61 kg/(m^2) as calculated from the following:    Height as of this encounter: 5' 8.5\" (1.74 m).    Weight as of this encounter: 251 lb (113.9 kg).  Medication Reconciliation: complete  "

## 2017-05-24 NOTE — PATIENT INSTRUCTIONS
Thank you for choosing [facility].  You may be receiving a survey in the mail from Omer[employer] Chopra regarding your visit today.  Please take a few minutes to complete and return the survey to let us know how we are doing.      If you have questions or concerns, please contact us via [employer] or you can contact your care team at [phone].    Our Clinic hours are:  Monday 6:40 am  to 7:00 pm  Tuesday -Friday 6:40 am to 5:00 pm    The [place] outpatient lab hours are:  Monday - Friday 6:10 am to 4:45 pm  Saturdays 7:00 am to 11:00 am  Appointments are required, call [phone]    If you have clinical questions after hours or would like to schedule an appointment,  call the clinic at [phone].

## 2017-05-24 NOTE — MR AVS SNAPSHOT
After Visit Summary   5/24/2017    Fco Arroyo    MRN: [id number]           Patient Information     Date Of Birth          1950        Visit Information        Provider Department      5/24/2017 7:40 AM Virginie Lan MD [facility]        Today's Diagnoses     Type 2 diabetes mellitus without complication, without long-term current use of insulin (H)          Care Instructions          Thank you for choosing [facility].  You may be receiving a survey in the mail from Omero Chopra regarding your visit today.  Please take a few minutes to complete and return the survey to let us know how we are doing.      If you have questions or concerns, please contact us via [employer] or you can contact your care team at [phone].    Our Clinic hours are:  Monday 6:40 am  to 7:00 pm  Tuesday -Friday 6:40 am to 5:00 pm    The [place] outpatient lab hours are:  Monday - Friday 6:10 am to 4:45 pm  Saturdays 7:00 am to 11:00 am  Appointments are required, call [phone]    If you have clinical questions after hours or would like to schedule an appointment,  call the clinic at [phone].          Follow-ups after your visit        Who to contact     If you have questions or need follow up information about today's clinic visit or your schedule please contact [facility] directly at [phone].  Normal or non-critical lab and imaging results will be communicated to you by [employer], letter or phone within 4 business days after the clinic has received the results. If you do not hear from us within 7 days, please contact the clinic through [employer] or phone. If you have a critical or abnormal lab result, we will notify you by phone as soon as possible.  Submit refill requests through [employer] or call your pharmacy and they will forward the refill request to us. Please allow 3 business days for your refill to be completed.          Additional Information About Your  "Visit        [employer] Information     [employer] lets you send messages to your doctor, view your test results, renew your prescriptions, schedule appointments and more. To sign up, go to [email or web address] . Click on \"Log in\" on the left side of the screen, which will take you to the Welcome page. Then click on \"Sign up Now\" on the right side of the page.     You will be asked to enter the access code listed below, as well as some personal information. Please follow the directions to create your username and password.     Your access code is: [email or web address]  Expires: 2017 12:09 PM     Your access code will  in 90 days. If you need help or a new code, please call your [place] clinic or [phone].        Care EveryWhere ID     This is your Care EveryWhere ID. This could be used by other organizations to access your [place] medical records  [id number]        Your Vitals Were     Pulse Temperature Height BMI (Body Mass Index)          81 97.6  F (36.4  C) (Tympanic) 5' 8.5\" (1.74 m) 37.61 kg/m2         Blood Pressure from Last 3 Encounters:   17 125/75   05/10/17 120/74   17 (!) 150/91    Weight from Last 3 Encounters:   17 251 lb (113.9 kg)   05/10/17 249 lb (112.9 kg)   17 250 lb (113.4 kg)              We Performed the Following     FOOT EXAM          Today's Medication Changes          These changes are accurate as of: 17  8:02 AM.  If you have any questions, ask your nurse or doctor.               Start taking these medicines.        Dose/Directions    metFORMIN 1000 MG tablet   Commonly known as:  GLUCOPHAGE   Used for:  Type 2 diabetes mellitus without complication, without long-term current use of insulin (H)   Replaces:  metFORMIN 500 MG 24 hr tablet   Started by:  Virginie Lan MD        Dose:  1000 mg   Take 1 tablet (1,000 mg) by mouth 2 times daily (with meals)   Quantity:  180 tablet   Refills:  3         Stop taking these medicines if you haven't " already. Please contact your care team if you have questions.     metFORMIN 500 MG 24 hr tablet   Commonly known as:  GLUCOPHAGE-XR   Replaced by:  metFORMIN 1000 MG tablet   Stopped by:  Virginie Lan MD                Where to get your medicines      These medications were sent to [id number] - [place], [place] - [address]  [address], [facility] [address]     Phone:  [phone]     metFORMIN 1000 MG tablet                Primary Care Provider Office Phone # Fax #    Virginie Lan [phone]       [facility] [address] [facility] [address]        Thank you!     Thank you for choosing [facility]  for your care. Our goal is always to provide you with excellent care. Hearing back from our patients is one way we can continue to improve our services. Please take a few minutes to complete the written survey that you may receive in the mail after your visit with us. Thank you!             Your Updated Medication List - Protect others around you: Learn how to safely use, store and throw away your medicines at www.disposemymeds.org.          This list is accurate as of: 5/24/17  8:02 AM.  Always use your most recent med list.                   Brand Name Dispense Instructions for use    ascorbic acid 500 MG tablet    VITAMIN C     1 TABLET  DAILY       aspirin 81 MG tablet      1 TABLET DAILY       blood glucose monitoring lancets     102 Box    1 each daily Use to test blood sugar 1 times daily or as directed.       * blood glucose monitoring test strip    ACCU-CHEK SMARTVIEW    50 strip    Use to test blood sugar 1 times daily or as directed.       * blood glucose monitoring test strip    ACCU-CHEK SMARTVIEW    200 each    Use to test blood sugar 2 times daily or as directed.       cinnamon 500 MG Caps      Take  by mouth.       glipiZIDE 2.5 MG 24 hr tablet    glipiZIDE XL    90 tablet    Take 1 tablet (2.5 mg) by mouth  daily       hydrochlorothiazide 12.5 MG capsule    MICROZIDE    90 capsule    Take 1 capsule (12.5 mg) by mouth every morning       lisinopril 40 MG tablet    PRINIVIL/ZESTRIL    90 tablet    Take 1 tablet (40 mg) by mouth daily       meclizine 25 MG tablet    ANTIVERT    30 tablet    Take 1 tablet (25 mg) by mouth every 6 hours as needed for dizziness       metFORMIN 1000 MG tablet    GLUCOPHAGE    180 tablet    Take 1 tablet (1,000 mg) by mouth 2 times daily (with meals)       ondansetron 4 MG tablet    ZOFRAN    18 tablet    Take 1 tablet (4 mg) by mouth every 8 hours as needed       simvastatin 40 MG tablet    ZOCOR    90 tablet    Take 1 tablet (40 mg) by mouth At Bedtime       VITAMIN E      daily       * Notice:  This list has 2 medication(s) that are the same as other medications prescribed for you. Read the directions carefully, and ask your doctor or other care provider to review them with you.

## 2017-05-28 DIAGNOSIS — E11.9 TYPE 2 DIABETES MELLITUS WITHOUT COMPLICATION, WITHOUT LONG-TERM CURRENT USE OF INSULIN (H): ICD-10-CM

## 2017-05-30 NOTE — TELEPHONE ENCOUNTER
Metformin         Last Written Prescription Date: 05/24/17  Last Fill Quantity: 180, # refills: 3  Last Office Visit with G, P or [facility] prescribing provider:  05/24/17        BP Readings from Last 3 Encounters:   05/24/17 125/75   05/10/17 120/74   04/24/17 (!) 150/91     Lab Results   Component Value Date    MICROL 6 05/10/2017     Lab Results   Component Value Date    UMALCR 6.41 05/10/2017     Creatinine   Date Value Ref Range Status   05/10/2017 0.87 0.66 - 1.25 mg/dL Final   ]  GFR Estimate   Date Value Ref Range Status   05/10/2017 87 >60 mL/min/1.7m2 Final     Comment:     Non  GFR Calc   11/02/2016 73 >60 mL/min/1.7m2 Final     Comment:     Non  GFR Calc   03/23/2015 >90  Non  GFR Calc   >60 mL/min/1.7m2 Final     GFR Estimate If Black   Date Value Ref Range Status   05/10/2017 >90   GFR Calc   >60 mL/min/1.7m2 Final   11/02/2016 88 >60 mL/min/1.7m2 Final     Comment:      GFR Calc   03/23/2015 >90   GFR Calc   >60 mL/min/1.7m2 Final     Lab Results   Component Value Date    CHOL 138 11/02/2016     Lab Results   Component Value Date    HDL 36 11/02/2016     Lab Results   Component Value Date    LDL 66 11/02/2016     Lab Results   Component Value Date    TRIG 178 11/02/2016     Lab Results   Component Value Date    CHOLHDLRATIO 3.7 03/23/2015     Lab Results   Component Value Date    AST 29 06/14/2011     Lab Results   Component Value Date    ALT 39 06/14/2011     Lab Results   Component Value Date    A1C 6.6 05/10/2017    A1C 6.6 11/16/2016    A1C 6.6 05/23/2016    A1C 6.5 09/11/2015    A1C 7.8 03/23/2015     Potassium   Date Value Ref Range Status   05/10/2017 4.2 3.4 - 5.3 mmol/L Final

## 2017-06-01 RX ORDER — METFORMIN HCL 500 MG
TABLET, EXTENDED RELEASE 24 HR ORAL
Qty: 120 TABLET | Refills: 0 | OUTPATIENT
Start: 2017-06-01

## 2017-08-24 ENCOUNTER — TELEPHONE (OUTPATIENT)
Dept: FAMILY MEDICINE | Facility: CLINIC | Age: 67
End: 2017-08-24

## 2017-08-24 NOTE — TELEPHONE ENCOUNTER
I have contacted the pt. He reports taking his medication has ordered. I see that pt is having his medications filled at [facility] pharmacy. Call came from [facility].   Conchita Pollack RN

## 2017-12-01 DIAGNOSIS — I10 BENIGN ESSENTIAL HYPERTENSION: Primary | ICD-10-CM

## 2017-12-01 DIAGNOSIS — E78.5 HYPERLIPIDEMIA LDL GOAL <100: ICD-10-CM

## 2017-12-05 NOTE — TELEPHONE ENCOUNTER
**This refill requires provider completion and is not appropriate for RN review per RN refill guidelines.**  Please associate appropriate diagnosis to this medication.  Suze Giron RN

## 2017-12-07 RX ORDER — LISINOPRIL 40 MG/1
TABLET ORAL
Qty: 90 TABLET | Refills: 3 | Status: SHIPPED | OUTPATIENT
Start: 2017-12-07 | End: 2018-06-12

## 2017-12-07 RX ORDER — SIMVASTATIN 40 MG
TABLET ORAL
Qty: 90 TABLET | Refills: 3 | Status: SHIPPED | OUTPATIENT
Start: 2017-12-07 | End: 2018-06-12

## 2017-12-07 RX ORDER — HYDROCHLOROTHIAZIDE 12.5 MG/1
CAPSULE ORAL
Qty: 90 CAPSULE | Refills: 3 | Status: SHIPPED | OUTPATIENT
Start: 2017-12-07 | End: 2018-06-12

## 2017-12-09 ENCOUNTER — TELEPHONE (OUTPATIENT)
Dept: FAMILY MEDICINE | Facility: CLINIC | Age: 67
End: 2017-12-09

## 2017-12-09 ENCOUNTER — NURSE TRIAGE (OUTPATIENT)
Dept: NURSING | Facility: CLINIC | Age: 67
End: 2017-12-09

## 2017-12-09 NOTE — TELEPHONE ENCOUNTER
Patient states requested refill of about 5 medications about a week ago and pharmacy has only been able to fill 1; patient not at home so not sure of which medications but states PCP office should have record of earlier request.  FNA routed to PCP Pool, high priority to call  patient at [phone] between the hours of 9AM-5PM, or at [phone]; may speak with wife.

## 2017-12-28 ENCOUNTER — RADIANT APPOINTMENT (OUTPATIENT)
Dept: GENERAL RADIOLOGY | Facility: CLINIC | Age: 67
End: 2017-12-28
Attending: NURSE PRACTITIONER
Payer: COMMERCIAL

## 2017-12-28 ENCOUNTER — OFFICE VISIT (OUTPATIENT)
Dept: FAMILY MEDICINE | Facility: CLINIC | Age: 67
End: 2017-12-28
Payer: COMMERCIAL

## 2017-12-28 VITALS
HEIGHT: 69 IN | DIASTOLIC BLOOD PRESSURE: 78 MMHG | HEART RATE: 91 BPM | SYSTOLIC BLOOD PRESSURE: 122 MMHG | WEIGHT: 255.6 LBS | BODY MASS INDEX: 37.86 KG/M2 | TEMPERATURE: 97.4 F

## 2017-12-28 DIAGNOSIS — J20.9 ACUTE BRONCHITIS WITH SYMPTOMS > 10 DAYS: Primary | ICD-10-CM

## 2017-12-28 DIAGNOSIS — J20.9 ACUTE BRONCHITIS WITH SYMPTOMS > 10 DAYS: ICD-10-CM

## 2017-12-28 DIAGNOSIS — R42 DIZZINESS: ICD-10-CM

## 2017-12-28 DIAGNOSIS — R93.89 ABNORMAL CHEST X-RAY: ICD-10-CM

## 2017-12-28 PROCEDURE — 99214 OFFICE O/P EST MOD 30 MIN: CPT | Performed by: NURSE PRACTITIONER

## 2017-12-28 PROCEDURE — 71020 XR CHEST 2 VW: CPT

## 2017-12-28 RX ORDER — AZITHROMYCIN 250 MG/1
TABLET, FILM COATED ORAL
Qty: 6 TABLET | Refills: 0 | Status: SHIPPED | OUTPATIENT
Start: 2017-12-28 | End: 2018-06-12

## 2017-12-28 RX ORDER — PREDNISONE 20 MG/1
20 TABLET ORAL 2 TIMES DAILY
Qty: 10 TABLET | Refills: 0 | Status: SHIPPED | OUTPATIENT
Start: 2017-12-28 | End: 2018-06-12

## 2017-12-28 NOTE — MR AVS SNAPSHOT
"              After Visit Summary   12/28/2017    Fco Arroyo    MRN: [id number]           Patient Information     Date Of Birth          1950        Visit Information        Provider Department      12/28/2017 1:40 PM Kendra Lewis APRN CNP [facility]        Today's Diagnoses     Acute bronchitis with symptoms > 10 days    -  1      Care Instructions    Chest Xray  Start Mucinex  mg twice daily as needed for cough, or guaifenesin, or robitussin  Z-pack 2 tablets today and then 1 tablet daily on day 2-5  Prednisone 20 mg twice daily for 5 days               Follow-ups after your visit        Future tests that were ordered for you today     Open Future Orders        Priority Expected Expires Ordered    XR Chest 2 Views Routine 12/28/2017 12/28/2018 12/28/2017            Who to contact     If you have questions or need follow up information about today's clinic visit or your schedule please contact [facility] directly at [phone].  Normal or non-critical lab and imaging results will be communicated to you by Embedly[employer], letter or phone within 4 business days after the clinic has received the results. If you do not hear from us within 7 days, please contact the clinic through [employer] or phone. If you have a critical or abnormal lab result, we will notify you by phone as soon as possible.  Submit refill requests through [employer] or call your pharmacy and they will forward the refill request to us. Please allow 3 business days for your refill to be completed.          Additional Information About Your Visit        Embedlyhart Information     [employer] lets you send messages to your doctor, view your test results, renew your prescriptions, schedule appointments and more. To sign up, go to [email or web address] . Click on \"Log in\" on the left side of the screen, which will take you to the Welcome page. Then click on \"Sign up Now\" on the right side of the page.     You will " "be asked to enter the access code listed below, as well as some personal information. Please follow the directions to create your username and password.     Your access code is: [email or web address]  Expires: 3/28/2018  2:18 PM     Your access code will  in 90 days. If you need help or a new code, please call your [facility] or [phone].        Care EveryWhere ID     This is your Care EveryWhere ID. This could be used by other organizations to access your [place] medical records  [id number]        Your Vitals Were     Pulse Temperature Height BMI (Body Mass Index)          91 97.4  F (36.3  C) (Tympanic) 5' 8.5\" (1.74 m) 38.3 kg/m2         Blood Pressure from Last 3 Encounters:   17 122/78   17 125/75   05/10/17 120/74    Weight from Last 3 Encounters:   17 255 lb 9.6 oz (115.9 kg)   17 251 lb (113.9 kg)   05/10/17 249 lb (112.9 kg)                 Today's Medication Changes          These changes are accurate as of: 17  2:18 PM.  If you have any questions, ask your nurse or doctor.               Start taking these medicines.        Dose/Directions    azithromycin 250 MG tablet   Commonly known as:  ZITHROMAX   Used for:  Acute bronchitis with symptoms > 10 days   Started by:  Kendra Lewis APRN CNP        Two tablets first day, then one tablet daily for four days.   Quantity:  6 tablet   Refills:  0       predniSONE 20 MG tablet   Commonly known as:  DELTASONE   Used for:  Acute bronchitis with symptoms > 10 days   Started by:  Kendra Lewis APRN CNP        Dose:  20 mg   Take 1 tablet (20 mg) by mouth 2 times daily   Quantity:  10 tablet   Refills:  0            Where to get your medicines      These medications were sent to [facility] [address] [place] [address] - [address] [facility] [address]     Phone:  [phone]     azithromycin 250 MG tablet    predniSONE 20 MG tablet                Primary Care Provider Office Phone # " Fax #    Ethandana Ez Lan [phone] [address] [facility] [address]        Equal Access to Services     ALISSA MARCANO : Hadii aad ku hadmenakatty Arias, wapegda luqadaha, qaybta kaalmada britta, marine bryantin hayaasilvia noonanshreya monreal girish de la cruz. So [facility] [phone].    ATENCIÓN: Si habla español, tiene a sharif disposición servicios gratuitos de asistencia lingüística. Llame al [phone].    We comply with applicable federal civil rights laws and [place] laws. We do not discriminate on the basis of race, color, national origin, age, disability, sex, sexual orientation, or gender identity.            Thank you!     Thank you for choosing [facility]  for your care. Our goal is always to provide you with excellent care. Hearing back from our patients is one way we can continue to improve our services. Please take a few minutes to complete the written survey that you may receive in the mail after your visit with us. Thank you!             Your Updated Medication List - Protect others around you: Learn how to safely use, store and throw away your medicines at www.disposemymeds.org.          This list is accurate as of: 12/28/17  2:18 PM.  Always use your most recent med list.                   Brand Name Dispense Instructions for use Diagnosis    ascorbic acid 500 MG tablet    VITAMIN C     1 TABLET  DAILY        aspirin 81 MG tablet      1 TABLET DAILY        azithromycin 250 MG tablet    ZITHROMAX    6 tablet    Two tablets first day, then one tablet daily for four days.    Acute bronchitis with symptoms > 10 days       blood glucose monitoring lancets     102 Box    1 each daily Use to test blood sugar 1 times daily or as directed.    Type 2 diabetes, HbA1C goal < 8% (H)       * blood glucose monitoring test strip    ACCU-CHEK SMARTVIEW    50 strip    Use to test blood sugar 1 times daily or as directed.    Type 2 diabetes, HbA1C goal < 8% (H)       * blood glucose  monitoring test strip    ACCU-CHEK SMARTVIEW    200 each    Use to test blood sugar 2 times daily or as directed.    Type 2 diabetes mellitus without complication (H)       cinnamon 500 MG Caps      Take  by mouth.        glipiZIDE 2.5 MG 24 hr tablet    glipiZIDE XL    90 tablet    Take 1 tablet (2.5 mg) by mouth daily    Type 2 diabetes mellitus without complication, without long-term current use of insulin (H)       hydrochlorothiazide 12.5 MG capsule    MICROZIDE    90 capsule    TAKE ONE CAPSULE BY MOUTH ONCE DAILY IN THE MORNING    Benign essential hypertension       lisinopril 40 MG tablet    PRINIVIL/ZESTRIL    90 tablet    TAKE ONE TABLET BY MOUTH ONCE DAILY    Benign essential hypertension       meclizine 25 MG tablet    ANTIVERT    30 tablet    Take 1 tablet (25 mg) by mouth every 6 hours as needed for dizziness    Dizziness       metFORMIN 1000 MG tablet    GLUCOPHAGE    180 tablet    Take 1 tablet (1,000 mg) by mouth 2 times daily (with meals)    Type 2 diabetes mellitus without complication, without long-term current use of insulin (H)       ondansetron 4 MG tablet    ZOFRAN    18 tablet    Take 1 tablet (4 mg) by mouth every 8 hours as needed    Dizziness       predniSONE 20 MG tablet    DELTASONE    10 tablet    Take 1 tablet (20 mg) by mouth 2 times daily    Acute bronchitis with symptoms > 10 days       simvastatin 40 MG tablet    ZOCOR    90 tablet    TAKE ONE TABLET BY MOUTH AT BEDTIME    Hyperlipidemia LDL goal <100       VITAMIN E      daily        * Notice:  This list has 2 medication(s) that are the same as other medications prescribed for you. Read the directions carefully, and ask your doctor or other care provider to review them with you.

## 2017-12-28 NOTE — PATIENT INSTRUCTIONS
Chest Xray  Start Mucinex  mg twice daily as needed for cough, or guaifenesin, or robitussin  Z-pack 2 tablets today and then 1 tablet daily on day 2-5  Prednisone 20 mg twice daily for 5 days

## 2017-12-28 NOTE — NURSING NOTE
"Chief Complaint   Patient presents with     Cough     Since August        Initial /78  Pulse 91  Temp 97.4  F (36.3  C) (Tympanic)  Ht 5' 8.5\" (1.74 m)  Wt 255 lb 9.6 oz (115.9 kg)  BMI 38.3 kg/m2 Estimated body mass index is 38.3 kg/(m^2) as calculated from the following:    Height as of this encounter: 5' 8.5\" (1.74 m).    Weight as of this encounter: 255 lb 9.6 oz (115.9 kg).  Medication Reconciliation: complete  "

## 2018-01-08 RX ORDER — IOPAMIDOL 755 MG/ML
80 INJECTION, SOLUTION INTRAVASCULAR ONCE
Status: COMPLETED | OUTPATIENT
Start: 2018-01-09 | End: 2018-01-09

## 2018-01-09 ENCOUNTER — HOSPITAL ENCOUNTER (OUTPATIENT)
Dept: CT IMAGING | Facility: CLINIC | Age: 68
Discharge: HOME | End: 2018-01-09
Attending: NURSE PRACTITIONER | Admitting: NURSE PRACTITIONER
Payer: MEDICARE

## 2018-01-09 DIAGNOSIS — R93.89 ABNORMAL CHEST X-RAY: ICD-10-CM

## 2018-01-09 LAB
CREAT BLD-MCNC: 0.9 MG/DL (ref 0.66–1.25)
GFR SERPL CREATININE-BSD FRML MDRD: 84 ML/MIN/1.7M2

## 2018-01-09 PROCEDURE — 82565 ASSAY OF CREATININE: CPT

## 2018-01-09 PROCEDURE — 25000125 ZZHC RX 250: Performed by: RADIOLOGY

## 2018-01-09 PROCEDURE — 71260 CT THORAX DX C+: CPT

## 2018-01-09 PROCEDURE — 25000128 H RX IP 250 OP 636: Performed by: RADIOLOGY

## 2018-01-09 RX ADMIN — IOPAMIDOL 80 ML: 755 INJECTION, SOLUTION INTRAVENOUS at 07:30

## 2018-01-09 RX ADMIN — SODIUM CHLORIDE 80 ML: 9 INJECTION, SOLUTION INTRAVENOUS at 07:30

## 2018-01-10 ENCOUNTER — TELEPHONE (OUTPATIENT)
Dept: FAMILY MEDICINE | Facility: CLINIC | Age: 68
End: 2018-01-10

## 2018-01-10 DIAGNOSIS — E11.9 TYPE 2 DIABETES MELLITUS WITHOUT COMPLICATION, WITHOUT LONG-TERM CURRENT USE OF INSULIN (H): ICD-10-CM

## 2018-01-10 DIAGNOSIS — R91.1 LUNG NODULE, SOLITARY: Primary | ICD-10-CM

## 2018-01-10 RX ORDER — GLIPIZIDE 2.5 MG/1
2.5 TABLET, EXTENDED RELEASE ORAL DAILY
Qty: 30 TABLET | Refills: 0 | Status: SHIPPED | OUTPATIENT
Start: 2018-01-10 | End: 2018-02-09

## 2018-01-10 NOTE — TELEPHONE ENCOUNTER
Reason for Call:  Other     Detailed comments: Patient called back for imaging results. He quit smoking a little over 8 years ago, therefore would need repeat CT in 12 months.    Patient also said his cough is starting to come back after finishing medication prescribed 12-28, patient wondering if he needs to be on medication again?     Phone Number Patient can be reached at: Home number on file [phone] (home) or Work number on file:  [phone] (work) PATIENT WILL BE AT WORK TILL 3 PM, WILL BE AT HOME NUMBER AFTER 3PM    Best Time: any    Can we leave a detailed message on this number? No    Call taken on 1/10/2018 at 1:16 PM by Samina Duran

## 2018-01-10 NOTE — TELEPHONE ENCOUNTER
I signed order for chest CT, repeat in 12 months.  He does not need to be on antibiotic anymore since chest CT was clear, no infection.  He can take Mucinex, or guaifenesin, or robitussin as needed   Post-infectious cough can last for up to 3 weeks sometimes.       RONI Kirkland CNP

## 2018-01-10 NOTE — TELEPHONE ENCOUNTER
Kendra,    CT with contrast or without or both for the follow up in 12 months?? Mariajose RUEDA RN

## 2018-01-10 NOTE — TELEPHONE ENCOUNTER
"Requested Prescriptions   Pending Prescriptions Disp Refills     glipiZIDE (GLUCOTROL XL) 2.5 MG 24 hr tablet [Pharmacy Med Name: GLIPIZIDE ER 2.5MG  TAB]  Last Written Prescription Date:  11/16/2016  Last Fill Quantity: 90,  # refills: 3   Last Office Visit with FMINDIA, SHIRAZ or [facility] prescribing provider:  12/28/2017   Future Office Visit:      90 tablet 3     Sig: TAKE ONE TABLET BY MOUTH ONCE DAILY    Sulfonylurea Agents Failed    1/10/2018  9:35 AM       Failed - Patient has documented LDL within the past 12 mos.    Recent Labs   Lab Test  11/02/16   0802   LDL  66            Failed - Patient has documented A1c within the specified period of time.    Recent Labs   Lab Test  05/10/17   1151   A1C  6.6*            Passed - Patient's BP is less than 140/90    BP Readings from Last 3 Encounters:   12/28/17 122/78   05/24/17 125/75   05/10/17 120/74                Passed - Patient has had a Microalbumin in the past 12 mos.    Recent Labs   Lab Test  05/10/17   1152   MICROL  6   UMALCR  6.41            Passed - Patient is age 18 or older       Passed - Patient has a recent creatinine (normal) within the past 12 mos.    Recent Labs   Lab Test  05/10/17   1151   CR  0.87            Passed - Patient has had an appointment with authorizing provider within the past 6 mos. or  within next 30 days    Patient had office visit in the last 6 months or has a visit in the next 30 days with authorizing provider.  See \"Patient Info\" tab in inbasket, or \"Choose Columns\" in Meds & Orders section of the refill encounter.             "

## 2018-02-09 DIAGNOSIS — E11.9 TYPE 2 DIABETES MELLITUS WITHOUT COMPLICATION, WITHOUT LONG-TERM CURRENT USE OF INSULIN (H): ICD-10-CM

## 2018-02-09 RX ORDER — GLIPIZIDE 2.5 MG/1
TABLET, EXTENDED RELEASE ORAL
Qty: 30 TABLET | Refills: 0 | Status: SHIPPED | OUTPATIENT
Start: 2018-02-09 | End: 2020-05-27

## 2018-02-09 NOTE — TELEPHONE ENCOUNTER
"Requested Prescriptions   Pending Prescriptions Disp Refills     glipiZIDE (GLUCOTROL XL) 2.5 MG 24 hr tablet [Pharmacy Med Name: GLIPIZIDE ER 2.5MG  TAB]  Last Written Prescription Date:  01/10/18  Last Fill Quantity: 30,  # refills: 0   Last Office Visit with [facility] provider:  12/28/17   Future Office Visit:      30 tablet 0     Sig: TAKE ONE TABLET BY MOUTH ONCE DAILY; NEEDS TO BE SEEN FOR FURTHER REFILLS    Sulfonylurea Agents Failed    2/9/2018 11:13 AM       Failed - Patient has documented LDL within the past 12 mos.    Recent Labs   Lab Test  11/02/16   0802   LDL  66          Failed - Patient has documented A1c within the specified period of time.    Recent Labs   Lab Test  05/10/17   1151   A1C  6.6*          Passed - Patient's BP is less than 140/90    BP Readings from Last 3 Encounters:   12/28/17 122/78   05/24/17 125/75   05/10/17 120/74          Passed - Patient has had a Microalbumin in the past 12 mos.    Recent Labs   Lab Test  05/10/17   1152   MICROL  6   UMALCR  6.41          Passed - Patient is age 18 or older       Passed - Patient has a recent creatinine (normal) within the past 12 mos.    Recent Labs   Lab Test  05/10/17   1151   CR  0.87            Passed - Patient has had an appointment with authorizing provider within the past 6 mos. or  within next 30 days    Patient had office visit in the last 6 months or has a visit in the next 30 days with authorizing provider.  See \"Patient Info\" tab in inbasket, or \"Choose Columns\" in Meds & Orders section of the refill encounter.              "

## 2018-03-04 DIAGNOSIS — E11.9 TYPE 2 DIABETES MELLITUS WITHOUT COMPLICATION, WITHOUT LONG-TERM CURRENT USE OF INSULIN (H): ICD-10-CM

## 2018-03-05 RX ORDER — GLIPIZIDE 2.5 MG/1
2.5 TABLET, EXTENDED RELEASE ORAL DAILY
Qty: 30 TABLET | Refills: 0 | Status: SHIPPED | OUTPATIENT
Start: 2018-03-05 | End: 2018-04-03

## 2018-03-05 NOTE — TELEPHONE ENCOUNTER
"Requested Prescriptions   Pending Prescriptions Disp Refills     glipiZIDE (GLUCOTROL XL) 2.5 MG 24 hr tablet [Pharmacy Med Name: GLIPIZIDE ER 2.5MG  TAB]  Last Written Prescription Date:  02/09/2018  Last Fill Quantity: 30,  # refills: 0   Last office visit: 12/28/2017 with prescribing provider:  Debbie   Future Office Visit:     90 tablet 3     Sig: TAKE ONE TABLET BY MOUTH ONCE DAILY    Sulfonylurea Agents Failed    3/4/2018 10:49 AM       Failed - Patient has documented LDL within the past 12 mos.    Recent Labs   Lab Test  11/02/16   0802   LDL  66            Failed - Patient has documented A1c within the specified period of time.    Recent Labs   Lab Test  05/10/17   1151   A1C  6.6*            Passed - Blood pressure less than 140/90 in past 6 months    BP Readings from Last 3 Encounters:   12/28/17 122/78   05/24/17 125/75   05/10/17 120/74                Passed - Patient has had a Microalbumin in the past 12 mos.    Recent Labs   Lab Test  05/10/17   1152   MICROL  6   UMALCR  6.41            Passed - Patient is age 18 or older       Passed - Patient has a recent creatinine (normal) within the past 12 mos.    Recent Labs   Lab Test  05/10/17   1151   CR  0.87            Passed - Recent (6 mo) or future (30 days) visit within the authorizing provider's specialty    Patient had office visit in the last 6 months or has a visit in the next 30 days with authorizing provider.  See \"Patient Info\" tab in inbasket, or \"Choose Columns\" in Meds & Orders section of the refill encounter.            Janes Zeng RT (R)    "

## 2018-05-11 DIAGNOSIS — E11.9 TYPE 2 DIABETES MELLITUS WITHOUT COMPLICATION, WITHOUT LONG-TERM CURRENT USE OF INSULIN (H): ICD-10-CM

## 2018-05-11 RX ORDER — GLIPIZIDE 2.5 MG/1
TABLET, EXTENDED RELEASE ORAL
Qty: 30 TABLET | Refills: 0 | Status: SHIPPED | OUTPATIENT
Start: 2018-05-11 | End: 2020-05-27

## 2018-05-11 NOTE — TELEPHONE ENCOUNTER
"Requested Prescriptions   Pending Prescriptions Disp Refills     glipiZIDE (GLUCOTROL XL) 2.5 MG 24 hr tablet [Pharmacy Med Name: GLIPIZIDE ER 2.5MG  TAB]        Last Written Prescription Date:  4-4-18  Last Fill Quantity: 30,   # refills: 0  Last Office Visit: 5-24-17  Future Office visit:      30 tablet 0     Sig: TAKE 1 TABLET BY MOUTH ONCE DAILY; NEEDS TO BE SEEN FOR FURTHER REFILLS    Sulfonylurea Agents Failed    5/11/2018  7:03 AM       Failed - Patient has documented LDL within the past 12 mos.    Recent Labs   Lab Test  11/02/16   0802   LDL  66            Failed - Patient has had a Microalbumin in the past 12 mos.    Recent Labs   Lab Test  05/10/17   1152   MICROL  6   UMALCR  6.41            Failed - Patient has documented A1c within the specified period of time.    If HgbA1C is 8 or greater, it needs to be on file within the past 3 months.  If less than 8, must be on file within the past 6 months.     Recent Labs   Lab Test  05/10/17   1151   A1C  6.6*            Passed - Blood pressure less than 140/90 in past 6 months    BP Readings from Last 3 Encounters:   12/28/17 122/78   05/24/17 125/75   05/10/17 120/74                Passed - Patient is age 18 or older       Passed - Patient has a recent creatinine (normal) within the past 12 mos.    Recent Labs   Lab Test  05/10/17   1151   CR  0.87            Passed - Recent (6 mo) or future (30 days) visit within the authorizing provider's specialty    Patient had office visit in the last 6 months or has a visit in the next 30 days with authorizing provider or within the authorizing provider's specialty.  See \"Patient Info\" tab in inbasket, or \"Choose Columns\" in Meds & Orders section of the refill encounter.              "

## 2018-05-30 ENCOUNTER — ALLIED HEALTH/NURSE VISIT (OUTPATIENT)
Dept: FAMILY MEDICINE | Facility: CLINIC | Age: 68
End: 2018-05-30
Payer: COMMERCIAL

## 2018-05-30 ENCOUNTER — TELEPHONE (OUTPATIENT)
Dept: FAMILY MEDICINE | Facility: CLINIC | Age: 68
End: 2018-05-30

## 2018-05-30 DIAGNOSIS — E11.9 TYPE 2 DIABETES MELLITUS WITHOUT COMPLICATION, WITHOUT LONG-TERM CURRENT USE OF INSULIN (H): Primary | ICD-10-CM

## 2018-05-30 PROCEDURE — 99207 ZZC NO CHARGE NURSE ONLY: CPT

## 2018-05-30 NOTE — MR AVS SNAPSHOT
After Visit Summary   5/30/2018    Fco Arroyo    MRN: [id number]           Patient Information     Date Of Birth          1950        Visit Information        Provider Department      5/30/2018 9:00 AM DAYAMI ESPINOSA FP/IM RN [facility]        Today's Diagnoses     Type 2 diabetes mellitus without complication, without long-term current use of insulin (H)    -  1       Follow-ups after your visit        Your next 10 appointments already scheduled     May 30, 2018  9:00 AM CDT   Nurse Only with DAYAMI ESPINOSA FP/IM RN   [facility]    [address] [facility] [address]   [phone]            Jun 12, 2018  7:00 AM CDT   Office Visit with Virginie Lan MD   [facility]    [address] [facility] [address]   [phone]           Bring a current list of meds and any records pertaining to this visit. For Physicals, please bring immunization records and any forms needing to be filled out. Please arrive 10 minutes early to complete paperwork.              Who to contact     If you have questions or need follow up information about today's clinic visit or your schedule please contact [facility] directly at [phone].  Normal or non-critical lab and imaging results will be communicated to you by MyChart, letter or phone within 4 business days after the clinic has received the results. If you do not hear from us within 7 days, please contact the clinic through MyChart or phone. If you have a critical or abnormal lab result, we will notify you by phone as soon as possible.  Submit refill requests through [employer] or call your pharmacy and they will forward the refill request to us. Please allow 3 business days for your refill to be completed.          Additional Information About Your Visit        Care EveryWhere ID     This is your Care EveryWhere ID. This could be used  by other organizations to access your [place] medical records  [id number]         Blood Pressure from Last 3 Encounters:   12/28/17 122/78   05/24/17 125/75   05/10/17 120/74    Weight from Last 3 Encounters:   12/28/17 255 lb 9.6 oz (115.9 kg)   05/24/17 251 lb (113.9 kg)   05/10/17 249 lb (112.9 kg)              Today, you had the following     No orders found for display       Primary Care Provider Office Phone # Fax #    Noaconstancesteven Lan [phone] [address] [facility] [address]        Equal Access to Services     ALISSA MARCANO : Rani Arias, wadago robb, jax kaalmada britta, marine de la cruz. So [facility] [phone].    ATENCIÓN: Si habla español, tiene a sharif disposición servicios gratuitos de asistencia lingüística. Llame al [phone].    We comply with applicable federal civil rights laws and [place] laws. We do not discriminate on the basis of race, color, national origin, age, disability, sex, sexual orientation, or gender identity.            Thank you!     Thank you for choosing [facility]  for your care. Our goal is always to provide you with excellent care. Hearing back from our patients is one way we can continue to improve our services. Please take a few minutes to complete the written survey that you may receive in the mail after your visit with us. Thank you!             Your Updated Medication List - Protect others around you: Learn how to safely use, store and throw away your medicines at www.disposemymeds.org.          This list is accurate as of 5/30/18  7:45 AM.  Always use your most recent med list.                   Brand Name Dispense Instructions for use Diagnosis    ascorbic acid 500 MG tablet    VITAMIN C     1 TABLET  DAILY        aspirin 81 MG tablet      1 TABLET DAILY        azithromycin 250 MG tablet    ZITHROMAX    6 tablet    Two tablets first day, then one tablet daily for  four days.    Acute bronchitis with symptoms > 10 days       blood glucose monitoring lancets     102 Box    1 each daily Use to test blood sugar 1 times daily or as directed.    Type 2 diabetes, HbA1C goal < 8% (H)       * blood glucose monitoring test strip    ACCU-CHEK SMARTVIEW    50 strip    Use to test blood sugar 1 times daily or as directed.    Type 2 diabetes, HbA1C goal < 8% (H)       * blood glucose monitoring test strip    ACCU-CHEK SMARTVIEW    200 each    Use to test blood sugar 2 times daily or as directed.    Type 2 diabetes mellitus without complication (H)       cinnamon 500 MG Caps      Take  by mouth.        * glipiZIDE 2.5 MG 24 hr tablet    GLUCOTROL XL    30 tablet    TAKE ONE TABLET BY MOUTH ONCE DAILY; NEEDS TO BE SEEN FOR FURTHER REFILLS    Type 2 diabetes mellitus without complication, without long-term current use of insulin (H)       * glipiZIDE 2.5 MG 24 hr tablet    GLUCOTROL XL    30 tablet    TAKE 1 TABLET BY MOUTH ONCE DAILY; NEEDS TO BE SEEN FOR FURTHER REFILLS    Type 2 diabetes mellitus without complication, without long-term current use of insulin (H)       hydrochlorothiazide 12.5 MG capsule    MICROZIDE    90 capsule    TAKE ONE CAPSULE BY MOUTH ONCE DAILY IN THE MORNING    Benign essential hypertension       lisinopril 40 MG tablet    PRINIVIL/ZESTRIL    90 tablet    TAKE ONE TABLET BY MOUTH ONCE DAILY    Benign essential hypertension       metFORMIN 1000 MG tablet    GLUCOPHAGE    180 tablet    Take 1 tablet (1,000 mg) by mouth 2 times daily (with meals)    Type 2 diabetes mellitus without complication, without long-term current use of insulin (H)       predniSONE 20 MG tablet    DELTASONE    10 tablet    Take 1 tablet (20 mg) by mouth 2 times daily    Acute bronchitis with symptoms > 10 days       simvastatin 40 MG tablet    ZOCOR    90 tablet    TAKE ONE TABLET BY MOUTH AT BEDTIME    Hyperlipidemia LDL goal <100       VITAMIN E      daily        * Notice:  This list has 4  medication(s) that are the same as other medications prescribed for you. Read the directions carefully, and ask your doctor or other care provider to review them with you.

## 2018-05-30 NOTE — NURSING NOTE
Patient presents to clinic requesting lab orders be placed for him. States he will schedule to see Dr. Lan in a few weeks after he gets back from vacation but would like to have the labs done before he is seen so they can discuss them. I will forward this message to Dr. Lan to place the lab orders.    Suze Giron RN

## 2018-05-31 DIAGNOSIS — E11.9 TYPE 2 DIABETES MELLITUS WITHOUT COMPLICATION, WITHOUT LONG-TERM CURRENT USE OF INSULIN (H): ICD-10-CM

## 2018-05-31 LAB
ALBUMIN SERPL-MCNC: 3.8 G/DL (ref 3.4–5)
ALP SERPL-CCNC: 57 U/L (ref 40–150)
ALT SERPL W P-5'-P-CCNC: 39 U/L (ref 0–70)
ANION GAP SERPL CALCULATED.3IONS-SCNC: 6 MMOL/L (ref 3–14)
AST SERPL W P-5'-P-CCNC: 27 U/L (ref 0–45)
BILIRUB SERPL-MCNC: 0.6 MG/DL (ref 0.2–1.3)
BUN SERPL-MCNC: 16 MG/DL (ref 7–30)
CALCIUM SERPL-MCNC: 9.2 MG/DL (ref 8.5–10.1)
CHLORIDE SERPL-SCNC: 103 MMOL/L (ref 94–109)
CHOLEST SERPL-MCNC: 153 MG/DL
CO2 SERPL-SCNC: 30 MMOL/L (ref 20–32)
CREAT SERPL-MCNC: 0.86 MG/DL (ref 0.66–1.25)
GFR SERPL CREATININE-BSD FRML MDRD: 89 ML/MIN/1.7M2
GLUCOSE SERPL-MCNC: 141 MG/DL (ref 70–99)
HBA1C MFR BLD: 7 % (ref 0–5.6)
HDLC SERPL-MCNC: 36 MG/DL
LDLC SERPL CALC-MCNC: 83 MG/DL
NONHDLC SERPL-MCNC: 117 MG/DL
POTASSIUM SERPL-SCNC: 3.8 MMOL/L (ref 3.4–5.3)
PROT SERPL-MCNC: 7.4 G/DL (ref 6.8–8.8)
SODIUM SERPL-SCNC: 139 MMOL/L (ref 133–144)
TRIGL SERPL-MCNC: 170 MG/DL

## 2018-05-31 PROCEDURE — 36415 COLL VENOUS BLD VENIPUNCTURE: CPT | Performed by: FAMILY MEDICINE

## 2018-05-31 PROCEDURE — 83036 HEMOGLOBIN GLYCOSYLATED A1C: CPT | Performed by: FAMILY MEDICINE

## 2018-05-31 PROCEDURE — 80061 LIPID PANEL: CPT | Performed by: FAMILY MEDICINE

## 2018-05-31 PROCEDURE — 80053 COMPREHEN METABOLIC PANEL: CPT | Performed by: FAMILY MEDICINE

## 2018-05-31 NOTE — LETTER
[facility]  [address] [facility] [place] [address]  Phone: [phone]  Fax: [phone]    June 4, 2018        Fco Arroyo  [address]  [place] [address]          Dear Fco,      Your test result showed mild increase in cholesterol. Recommend working on diet and exercise. Please continue on your cholesterol medication.     Component      Latest Ref Rng & Units 5/31/2018   Sodium      133 - 144 mmol/L 139   Potassium      3.4 - 5.3 mmol/L 3.8   Chloride      94 - 109 mmol/L 103   Carbon Dioxide      20 - 32 mmol/L 30   Anion Gap      3 - 14 mmol/L 6   Glucose      70 - 99 mg/dL 141 (H)   Urea Nitrogen      7 - 30 mg/dL 16   Creatinine      0.66 - 1.25 mg/dL 0.86   GFR Estimate      >60 mL/min/1.7m2 89   GFR Estimate If Black      >60 mL/min/1.7m2 >90   Calcium      8.5 - 10.1 mg/dL 9.2   Bilirubin Total      0.2 - 1.3 mg/dL 0.6   Albumin      3.4 - 5.0 g/dL 3.8   Protein Total      6.8 - 8.8 g/dL 7.4   Alkaline Phosphatase      40 - 150 U/L 57   ALT      0 - 70 U/L 39   AST      0 - 45 U/L 27   Cholesterol      <200 mg/dL 153   Triglycerides      <150 mg/dL 170 (H)   HDL Cholesterol      >39 mg/dL 36 (L)   LDL Cholesterol Calculated      <100 mg/dL 83   Non HDL Cholesterol      <130 mg/dL 117   Hemoglobin A1C      0 - 5.6 % 7.0 (H)     Sincerely,        Virginie Lan MD /  cma

## 2018-06-01 NOTE — PROGRESS NOTES
Please inform patient that test result showed mild increase in cholesterol . Recommend working on diet and exercise.Patient also to continue on his cholesterol medication    Thank you.     Virginie Lan M.D.

## 2018-06-12 ENCOUNTER — OFFICE VISIT (OUTPATIENT)
Dept: FAMILY MEDICINE | Facility: CLINIC | Age: 68
End: 2018-06-12
Payer: COMMERCIAL

## 2018-06-12 VITALS
WEIGHT: 253.6 LBS | BODY MASS INDEX: 39.8 KG/M2 | RESPIRATION RATE: 20 BRPM | HEIGHT: 67 IN | SYSTOLIC BLOOD PRESSURE: 136 MMHG | HEART RATE: 80 BPM | OXYGEN SATURATION: 96 % | DIASTOLIC BLOOD PRESSURE: 76 MMHG

## 2018-06-12 DIAGNOSIS — E11.9 TYPE 2 DIABETES MELLITUS WITHOUT COMPLICATION, WITHOUT LONG-TERM CURRENT USE OF INSULIN (H): Primary | ICD-10-CM

## 2018-06-12 DIAGNOSIS — M25.512 CHRONIC LEFT SHOULDER PAIN: ICD-10-CM

## 2018-06-12 DIAGNOSIS — E78.5 HYPERLIPIDEMIA LDL GOAL <100: ICD-10-CM

## 2018-06-12 DIAGNOSIS — Z12.11 SPECIAL SCREENING FOR MALIGNANT NEOPLASMS, COLON: ICD-10-CM

## 2018-06-12 DIAGNOSIS — G89.29 CHRONIC LEFT SHOULDER PAIN: ICD-10-CM

## 2018-06-12 DIAGNOSIS — I10 BENIGN ESSENTIAL HYPERTENSION: ICD-10-CM

## 2018-06-12 DIAGNOSIS — B35.1 DERMATOPHYTOSIS OF NAIL: ICD-10-CM

## 2018-06-12 DIAGNOSIS — Z11.59 ENCOUNTER FOR HCV SCREENING TEST FOR LOW RISK PATIENT: ICD-10-CM

## 2018-06-12 LAB
CREAT UR-MCNC: 210 MG/DL
MICROALBUMIN UR-MCNC: 21 MG/L
MICROALBUMIN/CREAT UR: 10.19 MG/G CR (ref 0–17)

## 2018-06-12 PROCEDURE — 99207 C FOOT EXAM  NO CHARGE: CPT | Performed by: FAMILY MEDICINE

## 2018-06-12 PROCEDURE — 82043 UR ALBUMIN QUANTITATIVE: CPT | Performed by: FAMILY MEDICINE

## 2018-06-12 PROCEDURE — 99214 OFFICE O/P EST MOD 30 MIN: CPT | Performed by: FAMILY MEDICINE

## 2018-06-12 RX ORDER — HYDROCHLOROTHIAZIDE 12.5 MG/1
CAPSULE ORAL
Qty: 90 CAPSULE | Refills: 3 | Status: SHIPPED | OUTPATIENT
Start: 2018-06-12 | End: 2019-06-13

## 2018-06-12 RX ORDER — LISINOPRIL 40 MG/1
40 TABLET ORAL DAILY
Qty: 90 TABLET | Refills: 3 | Status: SHIPPED | OUTPATIENT
Start: 2018-06-12 | End: 2019-06-13

## 2018-06-12 RX ORDER — GLIPIZIDE 2.5 MG/1
2.5 TABLET, EXTENDED RELEASE ORAL DAILY
Qty: 90 TABLET | Refills: 3 | Status: SHIPPED | OUTPATIENT
Start: 2018-06-12 | End: 2019-06-13

## 2018-06-12 RX ORDER — SIMVASTATIN 40 MG
TABLET ORAL
Qty: 90 TABLET | Refills: 3 | Status: SHIPPED | OUTPATIENT
Start: 2018-06-12 | End: 2019-06-13

## 2018-06-12 RX ORDER — TERBINAFINE HYDROCHLORIDE 250 MG/1
250 TABLET ORAL DAILY
Qty: 90 TABLET | Refills: 0 | Status: SHIPPED | OUTPATIENT
Start: 2018-06-12 | End: 2018-10-15

## 2018-06-12 NOTE — PATIENT INSTRUCTIONS
Nail Fungal Infection  A nail fungal infection changes the way fingernails and toenails look. They may thicken, discolor, change shape, or split. This condition is hard to treat because nails grow slowly and have limited blood supply. The infection often comes back after treatment.  There are 2 types of medicines used to treat this condition:    Topical anti-fungal medicines. These are applied to the surface of the skin and nail area. These medicines are not very effective because they can t get deep into the nail.    Oral antifungal medicines. These medicines work better because they go into the nail from the inside out. But the infection may still come back. It may take 9 to 12 months for your nail to look normal again. This means you are cured. You can repeat treatment if needed. Most people take these medicines without any problems. It is rare to stop therapy because of side effects. But your healthcare provider may give you some monitoring tests. Talk about possible side effects with your provider before starting treatment.  If medicines fail, the nail can be removed surgically or chemically. These methods physically remove the fungus from the body. This helps medical treatment be more effective.  Home care    Use medicines exactly as directed for as long as directed. Treating a fungal infection can take longer than other kinds of infections.    Smoking is a risk factor for fungal infection. This is one more reason to quit.    Wear absorbent socks, and shoes that let your feet breathe. Sweaty feet increase your risk of fungal infection. They also make an existing infection harder to treat.    Use footwear when in damp public places like swimming pools, gyms, and shower rooms. This will help you avoid the fungus that grows there.    Don't share nail clippers or scissors with others.  Follow-up care  Follow up with your healthcare provider, or as advised.  When to seek medical advice  Call your healthcare  provider right away if any of these occur:    Skin by the nail becomes red, swollen, painful, or drains pus (a creamy yellow or white liquid)    Side effects from oral anti-fungal medicines  Date Last Reviewed: 8/1/2016 2000-2017 [employer]. [address] [place] [address]. All rights reserved. This information is not intended as a substitute for professional medical care. Always follow your healthcare professional's instructions.

## 2018-06-12 NOTE — NURSING NOTE
"Chief Complaint   Patient presents with     Diabetes     recheck. Labs completed 5/30/18.     Toenail     concerned with toenail fungus of big toe, left foot. Has been using OTC creams.        Initial /87 (BP Location: Right arm, Patient Position: Chair, Cuff Size: Adult Regular)  Pulse 86  Resp 20  Ht 5' 7\" (1.702 m)  Wt 253 lb 9.6 oz (115 kg)  SpO2 96%  BMI 39.72 kg/m2 Estimated body mass index is 39.72 kg/(m^2) as calculated from the following:    Height as of this encounter: 5' 7\" (1.702 m).    Weight as of this encounter: 253 lb 9.6 oz (115 kg).    Medication Reconciliation: complete  Lizzy Oneil MA    "

## 2018-06-12 NOTE — PROGRESS NOTES
SUBJECTIVE:   Fco Arroyo is a 68 year old male who presents to clinic today for the following health issues:    Patient here for his diabetic recheck ,says he has been doing well. No acute complaints today. Wants something for toe nail fungus. Also complained of shoulder pain ongoing for months. Says sometimes when he extends the arm there is severe pain. Had an injury a long time ago.   Discussed A1C with him. It went up a bit. He declined adjusting his medication says he will work on diet and exercise. All medication needing refills.     Chief Complaint   Patient presents with     Diabetes     recheck. Labs completed 5/30/18.     Toenail     concerned with toenail fungus of big toe, left foot. Has been using OTC creams.      Diabetes Follow-up  Patient is checking blood sugars: once daily.  Results are as follows:         am - 110-120    Diabetic concerns: None     Symptoms of hypoglycemia (low blood sugar): rarely, but he gets shaky, and dizzy.     Paresthesias (numbness or burning in feet) or sores: YES concerns with toenail fungus on left foot     Date of last diabetic eye exam: has been a couple years since last exam     Hyperlipidemia Follow-Up    Rate your low fat/cholesterol diet?: fair    Taking statin?  Yes, no muscle aches from statin    Other lipid medications/supplements?:  none    Hypertension Follow-up    Outpatient blood pressures are not being checked.    Low Salt Diet: low salt    BP Readings from Last 2 Encounters:   06/12/18 136/76   12/28/17 122/78     Hemoglobin A1C (%)   Date Value   05/31/2018 7.0 (H)   05/10/2017 6.6 (H)     LDL Cholesterol Calculated (mg/dL)   Date Value   05/31/2018 83   11/02/2016 66       Amount of exercise or physical activity: None    Problems taking medications regularly: No    Medication side effects: none    Diet: regular (no restrictions)      Problem list and histories reviewed & adjusted, as indicated.  Additional history: as documented    Patient  Active Problem List   Diagnosis     R PLANTAR FIBROMATOSIS     Sensorineural hearing loss, bilateral     Hyperlipidemia LDL goal <100     Advanced directives, counseling/discussion     Morbid obesity (H)     Benign essential hypertension     Type 2 diabetes mellitus without complication, without long-term current use of insulin (H)     Past Surgical History:   Procedure Laterality Date     SURGICAL HISTORY OF -   1998    open cholecystectomy for gallstones s/p ERCP     SURGICAL HISTORY OF -   1997    cystoscopy     SURGICAL HISTORY OF -   11/1997    Urethral stricture       Social History   Substance Use Topics     Smoking status: Former Smoker     Types: Cigarettes     Smokeless tobacco: Never Used     Alcohol use Yes      Comment: little     Family History   Problem Relation Age of Onset     DIABETES Mother      Hypertension Mother      Hypertension Father      HEART DISEASE Brother      MI x2, CABG, 1st MI at 49-51 yo.      Hypertension Brother      C.A.D. Brother      Alzheimer Disease Brother      DIABETES Sister       maybe         Current Outpatient Prescriptions   Medication Sig Dispense Refill     ASPIRIN 81 MG OR TABS 1 TABLET DAILY       blood glucose (ACCU-CHEK SMARTVIEW) test strip Use to test blood sugar 1 times daily or as directed. 50 strip 12     blood glucose monitoring (ACCU-CHEK FASTCLIX) lancets 1 each daily Use to test blood sugar 1 times daily or as directed. 102 [address]     blood glucose monitoring (ACCU-CHEK SMARTVIEW) test strip Use to test blood sugar 2 times daily or as directed. 200 each 3     cinnamon 500 MG CAPS Take  by mouth.       glipiZIDE (GLIPIZIDE XL) 2.5 MG 24 hr tablet Take 1 tablet (2.5 mg) by mouth daily 90 tablet 3     glipiZIDE (GLUCOTROL XL) 2.5 MG 24 hr tablet TAKE 1 TABLET BY MOUTH ONCE DAILY; NEEDS TO BE SEEN FOR FURTHER REFILLS 30 tablet 0     glipiZIDE (GLUCOTROL XL) 2.5 MG 24 hr tablet TAKE ONE TABLET BY MOUTH ONCE DAILY; NEEDS TO BE SEEN FOR FURTHER REFILLS 30  "tablet 0     hydrochlorothiazide (MICROZIDE) 12.5 MG capsule TAKE ONE CAPSULE BY MOUTH ONCE DAILY IN THE MORNING 90 capsule 3     lisinopril (PRINIVIL/ZESTRIL) 40 MG tablet Take 1 tablet (40 mg) by mouth daily 90 tablet 3     metFORMIN (GLUCOPHAGE) 1000 MG tablet Take 1 tablet (1,000 mg) by mouth 2 times daily (with meals) 180 tablet 3     metFORMIN (GLUCOPHAGE) 1000 MG tablet Take 1 tablet (1,000 mg) by mouth 2 times daily (with meals) 180 tablet 3     simvastatin (ZOCOR) 40 MG tablet TAKE ONE TABLET BY MOUTH AT BEDTIME 90 tablet 3     terbinafine (LAMISIL) 250 MG tablet Take 1 tablet (250 mg) by mouth daily 90 tablet 0     VITAMIN C 500 MG PO TABS 1 TABLET  DAILY       VITAMIN E daily       [DISCONTINUED] hydrochlorothiazide (MICROZIDE) 12.5 MG capsule TAKE ONE CAPSULE BY MOUTH ONCE DAILY IN THE MORNING 90 capsule 3     [DISCONTINUED] lisinopril (PRINIVIL/ZESTRIL) 40 MG tablet TAKE ONE TABLET BY MOUTH ONCE DAILY 90 tablet 3     [DISCONTINUED] simvastatin (ZOCOR) 40 MG tablet TAKE ONE TABLET BY MOUTH AT BEDTIME 90 tablet 3     Allergies   Allergen Reactions     Morphine GI Disturbance     BP Readings from Last 3 Encounters:   06/12/18 136/76   12/28/17 122/78   05/24/17 125/75    Wt Readings from Last 3 Encounters:   06/12/18 253 lb 9.6 oz (115 kg)   12/28/17 255 lb 9.6 oz (115.9 kg)   05/24/17 251 lb (113.9 kg)                  Labs reviewed in EPIC    Reviewed and updated as needed this visit by clinical staff  Tobacco  Allergies  Med Hx  Surg Hx  Fam Hx  Soc Hx      Reviewed and updated as needed this visit by Provider         ROS:  Constitutional, HEENT, cardiovascular, pulmonary, gi and gu systems are negative, except as otherwise noted.    OBJECTIVE:     /76 (BP Location: Right arm, Patient Position: Chair, Cuff Size: Adult Large)  Pulse 80  Resp 20  Ht 5' 7\" (1.702 m)  Wt 253 lb 9.6 oz (115 kg)  SpO2 96%  BMI 39.72 kg/m2  Body mass index is 39.72 kg/(m^2).  GENERAL: healthy, alert and no " distress  EYES: Eyes grossly normal to inspection, PERRL and conjunctivae and sclerae normal  HENT: ear canals and TM's normal, nose and mouth without ulcers or lesions  NECK: no adenopathy, no asymmetry, masses, or scars and thyroid normal to palpation  RESP: lungs clear to auscultation - no rales, rhonchi or wheezes  CV: regular rate and rhythm, normal S1 S2, no S3 or S4, no murmur, click or rub, no peripheral edema and peripheral pulses strong  MS: no gross musculoskeletal defects noted, no edema  SKIN: no suspicious lesions or rashes  PSYCH: mentation appears normal, affect normal/bright    Diagnostic Test Results:  Results for orders placed or performed in visit on 05/31/18   Lipid panel reflex to direct LDL Fasting   Result Value Ref Range    Cholesterol 153 <200 mg/dL    Triglycerides 170 (H) <150 mg/dL    HDL Cholesterol 36 (L) >39 mg/dL    LDL Cholesterol Calculated 83 <100 mg/dL    Non HDL Cholesterol 117 <130 mg/dL   **Comprehensive metabolic panel FUTURE anytime   Result Value Ref Range    Sodium 139 133 - 144 mmol/L    Potassium 3.8 3.4 - 5.3 mmol/L    Chloride 103 94 - 109 mmol/L    Carbon Dioxide 30 20 - 32 mmol/L    Anion Gap 6 3 - 14 mmol/L    Glucose 141 (H) 70 - 99 mg/dL    Urea Nitrogen 16 7 - 30 mg/dL    Creatinine 0.86 0.66 - 1.25 mg/dL    GFR Estimate 89 >60 mL/min/1.7m2    GFR Estimate If Black >90 >60 mL/min/1.7m2    Calcium 9.2 8.5 - 10.1 mg/dL    Bilirubin Total 0.6 0.2 - 1.3 mg/dL    Albumin 3.8 3.4 - 5.0 g/dL    Protein Total 7.4 6.8 - 8.8 g/dL    Alkaline Phosphatase 57 40 - 150 U/L    ALT 39 0 - 70 U/L    AST 27 0 - 45 U/L   **A1C FUTURE anytime   Result Value Ref Range    Hemoglobin A1C 7.0 (H) 0 - 5.6 %       ASSESSMENT/PLAN:   1. Type 2 diabetes mellitus without complication, without long-term current use of insulin (H)  Labs discussed with patient . Asked to continue to work on diet and exercise.   - Albumin Random Urine Quantitative with Creat Ratio  - FOOT EXAM  - glipiZIDE  (GLIPIZIDE XL) 2.5 MG 24 hr tablet; Take 1 tablet (2.5 mg) by mouth daily  Dispense: 90 tablet; Refill: 3  - metFORMIN (GLUCOPHAGE) 1000 MG tablet; Take 1 tablet (1,000 mg) by mouth 2 times daily (with meals)  Dispense: 180 tablet; Refill: 3  - **A1C FUTURE 3mo; Future    2. Hyperlipidemia LDL goal <100  Medication refilled   - simvastatin (ZOCOR) 40 MG tablet; TAKE ONE TABLET BY MOUTH AT BEDTIME  Dispense: 90 tablet; Refill: 3    3. Encounter for HCV screening test for low risk patient  Hep c labs ordered   - **Hepatitis C Screen Reflex to RNA FUTURE anytime; Future    4. Benign essential hypertension  BP is stable   - hydrochlorothiazide (MICROZIDE) 12.5 MG capsule; TAKE ONE CAPSULE BY MOUTH ONCE DAILY IN THE MORNING  Dispense: 90 capsule; Refill: 3  - lisinopril (PRINIVIL/ZESTRIL) 40 MG tablet; Take 1 tablet (40 mg) by mouth daily  Dispense: 90 tablet; Refill: 3    5. Special screening for malignant neoplasms, colon  FIT screen ordered   - Fecal colorectal cancer screen (FIT); Future    6. Dermatophytosis of nail  Medication faxed   - terbinafine (LAMISIL) 250 MG tablet; Take 1 tablet (250 mg) by mouth daily  Dispense: 90 tablet; Refill: 0  - **Comprehensive metabolic panel FUTURE anytime; Future    FUTURE APPOINTMENTS:       - Follow-up visit as needed  Patient Instructions     Nail Fungal Infection  A nail fungal infection changes the way fingernails and toenails look. They may thicken, discolor, change shape, or split. This condition is hard to treat because nails grow slowly and have limited blood supply. The infection often comes back after treatment.  There are 2 types of medicines used to treat this condition:    Topical anti-fungal medicines. These are applied to the surface of the skin and nail area. These medicines are not very effective because they can t get deep into the nail.    Oral antifungal medicines. These medicines work better because they go into the nail from the inside out. But the  infection may still come back. It may take 9 to 12 months for your nail to look normal again. This means you are cured. You can repeat treatment if needed. Most people take these medicines without any problems. It is rare to stop therapy because of side effects. But your healthcare provider may give you some monitoring tests. Talk about possible side effects with your provider before starting treatment.  If medicines fail, the nail can be removed surgically or chemically. These methods physically remove the fungus from the body. This helps medical treatment be more effective.  Home care    Use medicines exactly as directed for as long as directed. Treating a fungal infection can take longer than other kinds of infections.    Smoking is a risk factor for fungal infection. This is one more reason to quit.    Wear absorbent socks, and shoes that let your feet breathe. Sweaty feet increase your risk of fungal infection. They also make an existing infection harder to treat.    Use footwear when in damp public places like swimming pools, gyms, and shower rooms. This will help you avoid the fungus that grows there.    Don't share nail clippers or scissors with others.  Follow-up care  Follow up with your healthcare provider, or as advised.  When to seek medical advice  Call your healthcare provider right away if any of these occur:    Skin by the nail becomes red, swollen, painful, or drains pus (a creamy yellow or white liquid)    Side effects from oral anti-fungal medicines  Date Last Reviewed: 8/1/2016 2000-2017 [employer]. [address] [place] [address], [place] [id number]. All rights reserved. This information is not intended as a substitute for professional medical care. Always follow your healthcare professional's instructions.            Virginie Lan MD  [facility]

## 2018-06-12 NOTE — MR AVS SNAPSHOT
After Visit Summary   6/12/2018    Fco Arroyo    MRN: [id number]           Patient Information     Date Of Birth          1950        Visit Information        Provider Department      6/12/2018 7:00 AM Virginie Lan MD [facility]        Today's Diagnoses     Type 2 diabetes mellitus without complication, without long-term current use of insulin (H)    -  1    Hyperlipidemia LDL goal <100        Encounter for HCV screening test for low risk patient        Benign essential hypertension        Special screening for malignant neoplasms, colon        Dermatophytosis of nail          Care Instructions      Nail Fungal Infection  A nail fungal infection changes the way fingernails and toenails look. They may thicken, discolor, change shape, or split. This condition is hard to treat because nails grow slowly and have limited blood supply. The infection often comes back after treatment.  There are 2 types of medicines used to treat this condition:    Topical anti-fungal medicines. These are applied to the surface of the skin and nail area. These medicines are not very effective because they can t get deep into the nail.    Oral antifungal medicines. These medicines work better because they go into the nail from the inside out. But the infection may still come back. It may take 9 to 12 months for your nail to look normal again. This means you are cured. You can repeat treatment if needed. Most people take these medicines without any problems. It is rare to stop therapy because of side effects. But your healthcare provider may give you some monitoring tests. Talk about possible side effects with your provider before starting treatment.  If medicines fail, the nail can be removed surgically or chemically. These methods physically remove the fungus from the body. This helps medical treatment be more effective.  Home care    Use medicines exactly as directed for as long as  directed. Treating a fungal infection can take longer than other kinds of infections.    Smoking is a risk factor for fungal infection. This is one more reason to quit.    Wear absorbent socks, and shoes that let your feet breathe. Sweaty feet increase your risk of fungal infection. They also make an existing infection harder to treat.    Use footwear when in damp public places like swimming pools, gyms, and shower rooms. This will help you avoid the fungus that grows there.    Don't share nail clippers or scissors with others.  Follow-up care  Follow up with your healthcare provider, or as advised.  When to seek medical advice  Call your healthcare provider right away if any of these occur:    Skin by the nail becomes red, swollen, painful, or drains pus (a creamy yellow or white liquid)    Side effects from oral anti-fungal medicines  Date Last Reviewed: 8/1/2016 2000-2017 [employer]. [address] [place] [address]. All rights reserved. This information is not intended as a substitute for professional medical care. Always follow your healthcare professional's instructions.                Follow-ups after your visit        Follow-up notes from your care team     Return in about 3 months (around 9/12/2018) for Lab Work.      Future tests that were ordered for you today     Open Future Orders        Priority Expected Expires Ordered    **Comprehensive metabolic panel FUTURE anytime Routine 6/12/2018 6/12/2019 6/12/2018    Fecal colorectal cancer screen (FIT) Routine 7/3/2018 9/4/2018 6/12/2018    **Hepatitis C Screen Reflex to RNA FUTURE anytime Routine 6/12/2018 6/12/2019 6/12/2018            Who to contact     If you have questions or need follow up information about today's clinic visit or your schedule please contact [facility] directly at [phone].  Normal or non-critical lab and imaging results will be communicated to you by MyChart, letter or phone within 4  "business days after the clinic has received the results. If you do not hear from us within 7 days, please contact the clinic through [employer] or phone. If you have a critical or abnormal lab result, we will notify you by phone as soon as possible.  Submit refill requests through [employer] or call your pharmacy and they will forward the refill request to us. Please allow 3 business days for your refill to be completed.          Additional Information About Your Visit        Care EveryWhere ID     This is your Care EveryWhere ID. This could be used by other organizations to access your [facility] medical records  [id number]        Your Vitals Were     Pulse Respirations Height Pulse Oximetry BMI (Body Mass Index)       83 20 5' 7\" (1.702 m) 96% 39.72 kg/m2        Blood Pressure from Last 3 Encounters:   06/12/18 140/85   12/28/17 122/78   05/24/17 125/75    Weight from Last 3 Encounters:   06/12/18 253 lb 9.6 oz (115 kg)   12/28/17 255 lb 9.6 oz (115.9 kg)   05/24/17 251 lb (113.9 kg)              We Performed the Following     Albumin Random Urine Quantitative with Creat Ratio     FOOT EXAM          Today's Medication Changes          These changes are accurate as of 6/12/18  7:39 AM.  If you have any questions, ask your nurse or doctor.               Start taking these medicines.        Dose/Directions    terbinafine 250 MG tablet   Commonly known as:  lamISIL   Used for:  Dermatophytosis of nail   Started by:  Virginie Lan MD        Dose:  250 mg   Take 1 tablet (250 mg) by mouth daily   Quantity:  90 tablet   Refills:  0         These medicines have changed or have updated prescriptions.        Dose/Directions    * glipiZIDE 2.5 MG 24 hr tablet   Commonly known as:  GLUCOTROL XL   This may have changed:  Another medication with the same name was added. Make sure you understand how and when to take each.   Used for:  Type 2 diabetes mellitus without complication, without long-term current use of insulin " (H)   Changed by:  Virginie Lan MD        TAKE ONE TABLET BY MOUTH ONCE DAILY; NEEDS TO BE SEEN FOR FURTHER REFILLS   Quantity:  30 tablet   Refills:  0       * glipiZIDE 2.5 MG 24 hr tablet   Commonly known as:  GLUCOTROL XL   This may have changed:  Another medication with the same name was added. Make sure you understand how and when to take each.   Used for:  Type 2 diabetes mellitus without complication, without long-term current use of insulin (H)   Changed by:  Virginie Lan MD        TAKE 1 TABLET BY MOUTH ONCE DAILY; NEEDS TO BE SEEN FOR FURTHER REFILLS   Quantity:  30 tablet   Refills:  0       * glipiZIDE 2.5 MG 24 hr tablet   Commonly known as:  glipiZIDE XL   This may have changed:  You were already taking a medication with the same name, and this prescription was added. Make sure you understand how and when to take each.   Used for:  Type 2 diabetes mellitus without complication, without long-term current use of insulin (H)   Changed by:  Virginie Lan MD        Dose:  2.5 mg   Take 1 tablet (2.5 mg) by mouth daily   Quantity:  90 tablet   Refills:  3       lisinopril 40 MG tablet   Commonly known as:  PRINIVIL/ZESTRIL   This may have changed:  See the new instructions.   Used for:  Benign essential hypertension   Changed by:  Virginie Lan MD        Dose:  40 mg   Take 1 tablet (40 mg) by mouth daily   Quantity:  90 tablet   Refills:  3       * metFORMIN 1000 MG tablet   Commonly known as:  GLUCOPHAGE   This may have changed:  Another medication with the same name was added. Make sure you understand how and when to take each.   Used for:  Type 2 diabetes mellitus without complication, without long-term current use of insulin (H)   Changed by:  Virginie Lan MD        Dose:  1000 mg   Take 1 tablet (1,000 mg) by mouth 2 times daily (with meals)   Quantity:  180 tablet   Refills:  3       * metFORMIN 1000 MG tablet   Commonly known as:   GLUCOPHAGE   This may have changed:  You were already taking a medication with the same name, and this prescription was added. Make sure you understand how and when to take each.   Used for:  Type 2 diabetes mellitus without complication, without long-term current use of insulin (H)   Changed by:  Virginie Lan MD        Dose:  1000 mg   Take 1 tablet (1,000 mg) by mouth 2 times daily (with meals)   Quantity:  180 tablet   Refills:  3       * Notice:  This list has 5 medication(s) that are the same as other medications prescribed for you. Read the directions carefully, and ask your doctor or other care provider to review them with you.         Where to get your medicines      These medications were sent to [facility] [address]   [address] [facility] [address]     Phone:  [phone]     glipiZIDE 2.5 MG 24 hr tablet    hydrochlorothiazide 12.5 MG capsule    lisinopril 40 MG tablet    metFORMIN 1000 MG tablet    simvastatin 40 MG tablet    terbinafine 250 MG tablet                Primary Care Provider Office Phone # Fax #    Virginie Lan [phone] [address] [facility] [address]        Equal Access to Services     ALISSA MARCANO AH: Hadii dian figueroao Sojones, waaxda luqadaha, qaybta kaalmada adeegyada, marine de la cruz. So [facility] [phone].    ATENCIÓN: Si habla español, tiene a sharif disposición servicios gratuitos de asistencia lingüística. Llame al [phone].    We comply with applicable federal civil rights laws and [place] laws. We do not discriminate on the basis of race, color, national origin, age, disability, sex, sexual orientation, or gender identity.            Thank you!     Thank you for choosing [facility]  for your care. Our goal is always to provide you with excellent care. Hearing back from our patients is one way we can continue to improve our services.  Please take a few minutes to complete the written survey that you may receive in the mail after your visit with us. Thank you!             Your Updated Medication List - Protect others around you: Learn how to safely use, store and throw away your medicines at www.disposemymeds.org.          This list is accurate as of 6/12/18  7:39 AM.  Always use your most recent med list.                   Brand Name Dispense Instructions for use Diagnosis    ascorbic acid 500 MG tablet    VITAMIN C     1 TABLET  DAILY        aspirin 81 MG tablet      1 TABLET DAILY        blood glucose monitoring lancets     102 Box    1 each daily Use to test blood sugar 1 times daily or as directed.    Type 2 diabetes, HbA1C goal < 8% (H)       * blood glucose monitoring test strip    ACCU-CHEK SMARTVIEW    50 strip    Use to test blood sugar 1 times daily or as directed.    Type 2 diabetes, HbA1C goal < 8% (H)       * blood glucose monitoring test strip    ACCU-CHEK SMARTVIEW    200 each    Use to test blood sugar 2 times daily or as directed.    Type 2 diabetes mellitus without complication (H)       cinnamon 500 MG Caps      Take  by mouth.        * glipiZIDE 2.5 MG 24 hr tablet    GLUCOTROL XL    30 tablet    TAKE ONE TABLET BY MOUTH ONCE DAILY; NEEDS TO BE SEEN FOR FURTHER REFILLS    Type 2 diabetes mellitus without complication, without long-term current use of insulin (H)       * glipiZIDE 2.5 MG 24 hr tablet    GLUCOTROL XL    30 tablet    TAKE 1 TABLET BY MOUTH ONCE DAILY; NEEDS TO BE SEEN FOR FURTHER REFILLS    Type 2 diabetes mellitus without complication, without long-term current use of insulin (H)       * glipiZIDE 2.5 MG 24 hr tablet    glipiZIDE XL    90 tablet    Take 1 tablet (2.5 mg) by mouth daily    Type 2 diabetes mellitus without complication, without long-term current use of insulin (H)       hydrochlorothiazide 12.5 MG capsule    MICROZIDE    90 capsule    TAKE ONE CAPSULE BY MOUTH ONCE DAILY IN THE MORNING    Benign  essential hypertension       lisinopril 40 MG tablet    PRINIVIL/ZESTRIL    90 tablet    Take 1 tablet (40 mg) by mouth daily    Benign essential hypertension       * metFORMIN 1000 MG tablet    GLUCOPHAGE    180 tablet    Take 1 tablet (1,000 mg) by mouth 2 times daily (with meals)    Type 2 diabetes mellitus without complication, without long-term current use of insulin (H)       * metFORMIN 1000 MG tablet    GLUCOPHAGE    180 tablet    Take 1 tablet (1,000 mg) by mouth 2 times daily (with meals)    Type 2 diabetes mellitus without complication, without long-term current use of insulin (H)       simvastatin 40 MG tablet    ZOCOR    90 tablet    TAKE ONE TABLET BY MOUTH AT BEDTIME    Hyperlipidemia LDL goal <100       terbinafine 250 MG tablet    lamISIL    90 tablet    Take 1 tablet (250 mg) by mouth daily    Dermatophytosis of nail       VITAMIN E      daily        * Notice:  This list has 7 medication(s) that are the same as other medications prescribed for you. Read the directions carefully, and ask your doctor or other care provider to review them with you.

## 2018-06-26 ENCOUNTER — TELEPHONE (OUTPATIENT)
Dept: FAMILY MEDICINE | Facility: CLINIC | Age: 68
End: 2018-06-26

## 2018-06-26 NOTE — TELEPHONE ENCOUNTER
Patient reports:  He started Lamisil 6/12/18  Today he woke up with orange red urine, he believes is a side effect from Lamisil  Urinary frequency about every hour, started 2 weeks ago  Medication is not helping with symptoms of toe  Can he stop taking this medication without weaning?    Routing to provider.    Mikki REYNOSO Rn

## 2018-06-26 NOTE — TELEPHONE ENCOUNTER
Patient notified of MD's recommendations  Patient verbalized understanding and reports he will monitor urine and make an appointment if no improvement or it gets worse    Mikki REYNOSO Rn

## 2018-06-26 NOTE — TELEPHONE ENCOUNTER
Reason for Call:  Other red urine    Detailed comments: Patient states his urine is turning red from the Lamisil he is taking.    Phone Number Patient can be reached at: Work number on file:  [phone] (work)    Best Time: any    Can we leave a detailed message on this number? YES    Call taken on 6/26/2018 at 1:58 PM by Ruth Fajardo

## 2018-07-06 ENCOUNTER — OFFICE VISIT (OUTPATIENT)
Dept: ORTHOPEDICS | Facility: CLINIC | Age: 68
End: 2018-07-06
Payer: COMMERCIAL

## 2018-07-06 ENCOUNTER — RADIANT APPOINTMENT (OUTPATIENT)
Dept: GENERAL RADIOLOGY | Facility: CLINIC | Age: 68
End: 2018-07-06
Attending: PEDIATRICS
Payer: COMMERCIAL

## 2018-07-06 VITALS
BODY MASS INDEX: 39.87 KG/M2 | HEIGHT: 67 IN | SYSTOLIC BLOOD PRESSURE: 153 MMHG | WEIGHT: 254 LBS | DIASTOLIC BLOOD PRESSURE: 88 MMHG

## 2018-07-06 DIAGNOSIS — M25.512 CHRONIC LEFT SHOULDER PAIN: ICD-10-CM

## 2018-07-06 DIAGNOSIS — M25.512 CHRONIC LEFT SHOULDER PAIN: Primary | ICD-10-CM

## 2018-07-06 DIAGNOSIS — M19.019 SHOULDER ARTHRITIS: ICD-10-CM

## 2018-07-06 DIAGNOSIS — M12.812 LEFT ROTATOR CUFF TEAR ARTHROPATHY: ICD-10-CM

## 2018-07-06 DIAGNOSIS — G89.29 CHRONIC LEFT SHOULDER PAIN: ICD-10-CM

## 2018-07-06 DIAGNOSIS — G89.29 CHRONIC LEFT SHOULDER PAIN: Primary | ICD-10-CM

## 2018-07-06 DIAGNOSIS — M75.102 LEFT ROTATOR CUFF TEAR ARTHROPATHY: ICD-10-CM

## 2018-07-06 PROCEDURE — 99203 OFFICE O/P NEW LOW 30 MIN: CPT | Performed by: PEDIATRICS

## 2018-07-06 PROCEDURE — 73030 X-RAY EXAM OF SHOULDER: CPT | Mod: LT

## 2018-07-06 NOTE — PROGRESS NOTES
Sports Medicine Clinic Visit    PCP: Virginie Lan Cruz is a 68 year old male who is seen  in consultation at the request of  Virginie Lan M.D. presenting with left shoulder pain    Injury: Patient reports chronic left shoulder pain.  He did have an injury 35 years ago where he fell off a roof landing on his shoulder. Since that injury his pain has been intermittent, but over the last 2 years the pain has increased in severity and consistency. He reports difficulty lifting his arm above his head and pain with general shoulder movement.    Location of Pain: left lateral shoulder  Duration of Pain: 35 years with worse pain over the last 2 year(s)  Rating of Pain at worst: 5/10  Rating of Pain Currently: 0/10  Symptoms are better with: Aleve  Symptoms are worse with: overhead motions: flexion and extension, lifting over head  Additional Features:   Positive: popping and weakness   Negative: swelling, bruising, grinding, catching, locking, instability, paresthesias and numbness  Other evaluation and/or treatments so far consists of: Saman  Prior History of related problems: nothing    Social History: semi retired, owner of used car lot    Review of Systems  Skin: no bruising, no swelling  Musculoskeletal: as above  Neurologic: no numbness, paresthesias  Remainder of review of systems is negative including constitutional, CV, pulmonary, GI, except as noted in HPI or medical history.    Patient's current problem list, past medical and surgical history, and family history were reviewed.    Patient Active Problem List   Diagnosis     R PLANTAR FIBROMATOSIS     Sensorineural hearing loss, bilateral     Hyperlipidemia LDL goal <100     Advanced directives, counseling/discussion     Morbid obesity (H)     Benign essential hypertension     Type 2 diabetes mellitus without complication, without long-term current use of insulin (H)     Past Medical History:   Diagnosis Date     DIABETES  "MELLITUS TYPE II-UNCOMPL 8/17/2005     Essential hypertension, benign     on meds, Stress Echo 1991 - normal, but suboptimal exertion     Other and unspecified hyperlipidemia      Other isolated or specific phobias     atenolol helps, wants open MRI     Other specified causes of urethral stricture     cystoscopy 1997 some hematuria in past - benign     Squamous cell carcinoma      Past Surgical History:   Procedure Laterality Date     SURGICAL HISTORY OF -   1998    open cholecystectomy for gallstones s/p ERCP     SURGICAL HISTORY OF -   1997    cystoscopy     SURGICAL HISTORY OF -   11/1997    Urethral stricture     Family History   Problem Relation Age of Onset     Diabetes Mother      Hypertension Mother      Hypertension Father      HEART DISEASE Brother      MI x2, CABG, 1st MI at 49-51 yo.      Hypertension Brother      C.A.D. Brother      Alzheimer Disease Brother      Diabetes Sister       maybe         Objective  /88 (BP Location: Left arm, Patient Position: Chair, Cuff Size: Adult Regular)  Ht 5' 7\" (1.702 m)  Wt 254 lb (115.2 kg)  BMI 39.78 kg/m2    GENERAL APPEARANCE: healthy, alert and no distress   GAIT: NORMAL  SKIN: no suspicious lesions or rashes  HEENT: Sclera clear, anicteric  CV: good peripheral pulses  RESP: Breathing not labored  NEURO: Normal strength and tone, mentation intact and speech normal  PSYCH:  mentation appears normal and affect normal/bright    Bilateral Shoulder exam    Inspection and Posture:       rounded shoulders and upper back    Skin:        no visible deformities    Tender:        none    Non Tender:       remainder of shoulder bilateral    ROM:        forward flexion 120  left       abduction 120 left       internal rotation gluteal region left       external rotation 30 left    Painful motions:       end range flexion and elevation left    Strength:        abduction 4/5 left       flexion 4/5 left       internal rotation 3/5 left       external rotation 3/5 " left    Impingement testing:       positive (+) Neer left       positive (+) Marquez left    Sensation:        normal sensation over shoulder and upper extremity       Radiology  I ordered, visualized and reviewed these images with the patient  3 XR views of left reviewed: no acute bony abnormality, mild glenohumeral degenerative change with high riding humeral head likely indicating chronic rotator cuff tear  - will follow official read      Assessment:  1. Chronic left shoulder pain    2. Left rotator cuff tear arthropathy    3. Shoulder arthritis      We discussed the following treatment options: symptom treatment, activity modification/rest, imaging, rehab and referral. Following discussion, plan: patient would like to start with physical therapy, is not interested in consider injections or possible surgery at this time.    Plan:  - Today's Plan of Care:  Rehab: [facility] [phone]    -We also discussed other future treatment options:  Referral to Orthopedic surgeon, MRI of the shoulder or Steroid injection of shoulder    Follow Up: as needed    Patient to follow up with Primary Care provider regarding elevated blood pressure.    Concerning signs and symptoms were reviewed.  The patient expressed understanding of this management plan and all questions were answered at this time.    Thanks for the opportunity to participate in the care of this patient, I will keep you updated on their progress.    CC: Virginie Valdivia MD CAQ  Primary Care Sports Medicine  [facility] [place]

## 2018-07-06 NOTE — LETTER
7/6/2018         RE: Fco Arroyo  [address]  [facility] [address]        Dear Colleague,    Thank you for referring your patient, Fco Arroyo, to the [facility]. Please see a copy of my visit note below.    Sports Medicine Clinic Visit    PCP: Virginie Lan    Fco Arroyo is a 68 year old male who is seen  in consultation at the request of  Virginie Lan M.D. presenting with left shoulder pain    Injury: Patient reports chronic left shoulder pain.  He did have an injury 35 years ago where he fell off a roof landing on his shoulder. Since that injury his pain has been intermittent, but over the last 2 years the pain has increased in severity and consistency. He reports difficulty lifting his arm above his head and pain with general shoulder movement.    Location of Pain: left lateral shoulder  Duration of Pain: 35 years with worse pain over the last 2 year(s)  Rating of Pain at worst: 5/10  Rating of Pain Currently: 0/10  Symptoms are better with: Aleve  Symptoms are worse with: overhead motions: flexion and extension, lifting over head  Additional Features:   Positive: popping and weakness   Negative: swelling, bruising, grinding, catching, locking, instability, paresthesias and numbness  Other evaluation and/or treatments so far consists of: Saman  Prior History of related problems: nothing    Social History: semi retired, owner of used car lot    Review of Systems  Skin: no bruising, no swelling  Musculoskeletal: as above  Neurologic: no numbness, paresthesias  Remainder of review of systems is negative including constitutional, CV, pulmonary, GI, except as noted in HPI or medical history.    Patient's current problem list, past medical and surgical history, and family history were reviewed.    Patient Active Problem List   Diagnosis     R PLANTAR FIBROMATOSIS     Sensorineural hearing loss, bilateral     Hyperlipidemia LDL  "goal <100     Advanced directives, counseling/discussion     Morbid obesity (H)     Benign essential hypertension     Type 2 diabetes mellitus without complication, without long-term current use of insulin (H)     Past Medical History:   Diagnosis Date     DIABETES MELLITUS TYPE II-UNCOMPL 8/17/2005     Essential hypertension, benign     on meds, Stress Echo 1991 - normal, but suboptimal exertion     Other and unspecified hyperlipidemia      Other isolated or specific phobias     atenolol helps, wants open MRI     Other specified causes of urethral stricture     cystoscopy 1997 some hematuria in past - benign     Squamous cell carcinoma      Past Surgical History:   Procedure Laterality Date     SURGICAL HISTORY OF -   1998    open cholecystectomy for gallstones s/p ERCP     SURGICAL HISTORY OF -   1997    cystoscopy     SURGICAL HISTORY OF -   11/1997    Urethral stricture     Family History   Problem Relation Age of Onset     Diabetes Mother      Hypertension Mother      Hypertension Father      HEART DISEASE Brother      MI x2, CABG, 1st MI at 49-49 yo.      Hypertension Brother      C.A.D. Brother      Alzheimer Disease Brother      Diabetes Sister       maybe         Objective  /88 (BP Location: Left arm, Patient Position: Chair, Cuff Size: Adult Regular)  Ht 5' 7\" (1.702 m)  Wt 254 lb (115.2 kg)  BMI 39.78 kg/m2    GENERAL APPEARANCE: healthy, alert and no distress   GAIT: NORMAL  SKIN: no suspicious lesions or rashes  HEENT: Sclera clear, anicteric  CV: good peripheral pulses  RESP: Breathing not labored  NEURO: Normal strength and tone, mentation intact and speech normal  PSYCH:  mentation appears normal and affect normal/bright    Bilateral Shoulder exam    Inspection and Posture:       rounded shoulders and upper back    Skin:        no visible deformities    Tender:        none    Non Tender:       remainder of shoulder bilateral    ROM:        forward flexion 120  left       abduction 120 left   "     internal rotation gluteal region left       external rotation 30 left    Painful motions:       end range flexion and elevation left    Strength:        abduction 4/5 left       flexion 4/5 left       internal rotation 3/5 left       external rotation 3/5 left    Impingement testing:       positive (+) Neer left       positive (+) Marquez left    Sensation:        normal sensation over shoulder and upper extremity       Radiology  I ordered, visualized and reviewed these images with the patient  3 XR views of left reviewed: no acute bony abnormality, mild glenohumeral degenerative change with high riding humeral head likely indicating chronic rotator cuff tear  - will follow official read      Assessment:  1. Chronic left shoulder pain    2. Left rotator cuff tear arthropathy    3. Shoulder arthritis      We discussed the following treatment options: symptom treatment, activity modification/rest, imaging, rehab and referral. Following discussion, plan: patient would like to start with physical therapy, is not interested in consider injections or possible surgery at this time.    Plan:  - Today's Plan of Care:  Rehab: [facility] [phone]    -We also discussed other future treatment options:  Referral to Orthopedic surgeon, MRI of the shoulder or Steroid injection of shoulder    Follow Up: as needed    Patient to follow up with Primary Care provider regarding elevated blood pressure.    Concerning signs and symptoms were reviewed.  The patient expressed understanding of this management plan and all questions were answered at this time.    Thanks for the opportunity to participate in the care of this patient, I will keep you updated on their progress.    CC: Virginie Valdivia MD CAQ  Primary Care Sports Medicine  [facility]    Again, thank you for allowing me to participate in the care of your patient.         Sincerely,        Vannessa Valdivia MD

## 2018-07-06 NOTE — MR AVS SNAPSHOT
After Visit Summary   7/6/2018    Fco Arroyo    MRN: [id number]           Patient Information     Date Of Birth          1950        Visit Information        Provider Department      7/6/2018 8:00 AM Vannessa Valdivia MD [facility]        Today's Diagnoses     Chronic left shoulder pain    -  1    Left rotator cuff tear arthropathy        Shoulder arthritis          Care Instructions        Plan:  - Today's Plan of Care:  Rehab: Physical Therapy: [facility] - [phone]    -We also discussed other future treatment options:  Referral to Orthopedic surgeon, MRI of the shoulder or Steroid injection of shoulder    Follow Up: as needed    If you have any further questions for your physician or physician s care team you can call [phone] and use option 3 to leave a voice message. Calls received during business hours will be returned same day.      Patient to follow up with Primary Care provider regarding elevated blood pressure.              Follow-ups after your visit        Additional Services     PHYSICAL THERAPY REFERRAL       *This therapy referral will be filtered to a centralized scheduling office at [facility] and the patient will receive a call to schedule an appointment at a [facility] location most convenient for them. *     [facility] provides Physical Therapy evaluation and treatment and many specialty services across the [facility] system.  If requesting a specialty program, please choose from the list below.    If you have not heard from the scheduling office within 2 business days, please call [phone] for all locations, with the exception of [place], please call [phone] and [facility], please call [phone]  Treatment: Evaluation & Treatment  Special Instructions/Modalities: Evaluate and treat  Special Programs: None    Please be aware that coverage of these services is subject to  "the terms and limitations of your health insurance plan.  Call member services at your health plan with any benefit or coverage questions.      **Note to Provider:  If you are referring outside of [facility] for the therapy appointment, please list the name of the location in the \"special instructions\" above, print the referral and give to the patient to schedule the appointment.                  Who to contact     If you have questions or need follow up information about today's clinic visit or your schedule please contact [facility] directly at [phone].  Normal or non-critical lab and imaging results will be communicated to you by MyChart, letter or phone within 4 business days after the clinic has received the results. If you do not hear from us within 7 days, please contact the clinic through MyChart or phone. If you have a critical or abnormal lab result, we will notify you by phone as soon as possible.  Submit refill requests through [employer] or call your pharmacy and they will forward the refill request to us. Please allow 3 business days for your refill to be completed.          Additional Information About Your Visit        Care EveryWhere ID     This is your Care EveryWhere ID. This could be used by other organizations to access your [facility] medical records  [id number]        Your Vitals Were     Height BMI (Body Mass Index)                5' 7\" (1.702 m) 39.78 kg/m2           Blood Pressure from Last 3 Encounters:   07/06/18 153/88   06/12/18 136/76   12/28/17 122/78    Weight from Last 3 Encounters:   07/06/18 254 lb (115.2 kg)   06/12/18 253 lb 9.6 oz (115 kg)   12/28/17 255 lb 9.6 oz (115.9 kg)              We Performed the Following     PHYSICAL THERAPY REFERRAL        Primary Care Provider Office Phone # Fax #    Virginie Lan [phone] [address] [facility] [address]        Equal Access to Services     ALISSA MARCANO AH: " Hadii aad ku hadmenao Soomaali, waaxda luqadaha, qaybta kaalmada britta, marine bryantsteven bowenssilvia dorothy. So [facility] [phone].    ATENCIÓN: Si jewell avila, tiene a sharif disposición servicios gratuitos de asistencia lingüística. Llame al [phone].    We comply with applicable federal civil rights laws and [place] laws. We do not discriminate on the basis of race, color, national origin, age, disability, sex, sexual orientation, or gender identity.            Thank you!     Thank you for choosing [facility]  for your care. Our goal is always to provide you with excellent care. Hearing back from our patients is one way we can continue to improve our services. Please take a few minutes to complete the written survey that you may receive in the mail after your visit with us. Thank you!             Your Updated Medication List - Protect others around you: Learn how to safely use, store and throw away your medicines at www.disposemymeds.org.          This list is accurate as of 7/6/18  8:21 AM.  Always use your most recent med list.                   Brand Name Dispense Instructions for use Diagnosis    ascorbic acid 500 MG tablet    VITAMIN C     1 TABLET  DAILY        aspirin 81 MG tablet      1 TABLET DAILY        blood glucose monitoring lancets     102 Box    1 each daily Use to test blood sugar 1 times daily or as directed.    Type 2 diabetes, HbA1C goal < 8% (H)       * blood glucose monitoring test strip    ACCU-CHEK SMARTVIEW    50 strip    Use to test blood sugar 1 times daily or as directed.    Type 2 diabetes, HbA1C goal < 8% (H)       * blood glucose monitoring test strip    ACCU-CHEK SMARTVIEW    200 each    Use to test blood sugar 2 times daily or as directed.    Type 2 diabetes mellitus without complication (H)       cinnamon 500 MG Caps      Take  by mouth.        * glipiZIDE 2.5 MG 24 hr tablet    GLUCOTROL XL    30 tablet    TAKE ONE TABLET BY MOUTH  ONCE DAILY; NEEDS TO BE SEEN FOR FURTHER REFILLS    Type 2 diabetes mellitus without complication, without long-term current use of insulin (H)       * glipiZIDE 2.5 MG 24 hr tablet    GLUCOTROL XL    30 tablet    TAKE 1 TABLET BY MOUTH ONCE DAILY; NEEDS TO BE SEEN FOR FURTHER REFILLS    Type 2 diabetes mellitus without complication, without long-term current use of insulin (H)       * glipiZIDE 2.5 MG 24 hr tablet    glipiZIDE XL    90 tablet    Take 1 tablet (2.5 mg) by mouth daily    Type 2 diabetes mellitus without complication, without long-term current use of insulin (H)       hydrochlorothiazide 12.5 MG capsule    MICROZIDE    90 capsule    TAKE ONE CAPSULE BY MOUTH ONCE DAILY IN THE MORNING    Benign essential hypertension       lisinopril 40 MG tablet    PRINIVIL/ZESTRIL    90 tablet    Take 1 tablet (40 mg) by mouth daily    Benign essential hypertension       * metFORMIN 1000 MG tablet    GLUCOPHAGE    180 tablet    Take 1 tablet (1,000 mg) by mouth 2 times daily (with meals)    Type 2 diabetes mellitus without complication, without long-term current use of insulin (H)       * metFORMIN 1000 MG tablet    GLUCOPHAGE    180 tablet    Take 1 tablet (1,000 mg) by mouth 2 times daily (with meals)    Type 2 diabetes mellitus without complication, without long-term current use of insulin (H)       simvastatin 40 MG tablet    ZOCOR    90 tablet    TAKE ONE TABLET BY MOUTH AT BEDTIME    Hyperlipidemia LDL goal <100       terbinafine 250 MG tablet    lamISIL    90 tablet    Take 1 tablet (250 mg) by mouth daily    Dermatophytosis of nail       VITAMIN E      daily        * Notice:  This list has 7 medication(s) that are the same as other medications prescribed for you. Read the directions carefully, and ask your doctor or other care provider to review them with you.

## 2018-08-01 ENCOUNTER — HOSPITAL ENCOUNTER (OUTPATIENT)
Dept: PHYSICAL THERAPY | Facility: CLINIC | Age: 68
Setting detail: THERAPIES SERIES
End: 2018-08-01
Attending: PEDIATRICS
Payer: MEDICARE

## 2018-08-01 PROCEDURE — G8984 CARRY CURRENT STATUS: HCPCS | Mod: GP,CK | Performed by: PHYSICAL THERAPIST

## 2018-08-01 PROCEDURE — G8985 CARRY GOAL STATUS: HCPCS | Mod: GP,CI | Performed by: PHYSICAL THERAPIST

## 2018-08-01 PROCEDURE — 40000718 ZZHC STATISTIC PT DEPARTMENT ORTHO VISIT: Performed by: PHYSICAL THERAPIST

## 2018-08-01 PROCEDURE — 97110 THERAPEUTIC EXERCISES: CPT | Mod: GP | Performed by: PHYSICAL THERAPIST

## 2018-08-01 PROCEDURE — 97161 PT EVAL LOW COMPLEX 20 MIN: CPT | Mod: GP | Performed by: PHYSICAL THERAPIST

## 2018-08-01 NOTE — PROGRESS NOTES
08/01/18 0700   General Information   Type of Visit Initial OP Ortho PT Evaluation   Start of Care Date 08/01/18   Referring Physician Vannessa Valdivia MD   Patient/Family Goals Statement To help reduce the pain   Orders Evaluate and Treat   Date of Order 07/06/18   Insurance Type Medicare;[employer]   Medical Diagnosis Chronic left shoulder pain       Present No   Body Part(s)   Body Part(s) Shoulder   Presentation and Etiology   Pertinent history of current problem (include personal factors and/or comorbidities that impact the POC) Left shoulder has been hurting off and on for 25 years since he landed on falling about 1 story.     Impairments A. Pain;F. Decreased strength and endurance   Functional Limitations perform activities of daily living;perform required work activities;perform desired leisure / sports activities   Symptom Location Left lateral shoulder, clicking from time to time   How/Where did it occur With repetition/overuse;With a fall;From Degenerative Joint Disease   Onset date of current episode/exacerbation 05/01/18   Chronicity Chronic   Pain rating (0-10 point scale) Best (/10);Worst (/10)   Best (/10) 0   Worst (/10) 2   Pain quality A. Sharp   Frequency of pain/symptoms C. With activity   Pain/symptoms are: Worse in the morning   Pain/symptoms exacerbated by G. Certain positions;H. Overhead reach   Pain/symptoms eased by E. Changing positions   Progression of symptoms since onset: Improved   Current / Previous Interventions   Diagnostic Tests: MRI   MRI Results Results   MRI results 7/6/18 MR left shoulder:   FINDINGS: Mild osteoarthritis at the acromioclavicular and glenohumeral joints. There is some narrowing of the acromiohumeral interval suggesting an underlying rotator cuff tear or tendinosis. No acute fracture or malalignment.   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 No   Have you fallen and had an injury in the past year? No   Is patient a fall risk? No   Shoulder Objective Findings   Side (if bilateral, select both right and left) Left   Posture Clarke rounded shoulders; increased thoracic kyphosis   Cervical Screen (ROM, quadrant) Limited clarke sidebend < 20 deg; limited clarke rotation <70 degrees   Left Shoulder Flexion AROM Right=120 deg; Left=110 deg   Left Shoulder Flexion PROM Left=150 deg   Left Shoulder ER AROM Clarke=60 deg   Left Shoulder IR AROM T7   Left Shoulder Flexion Strength 4/5   Left Shoulder ER Strength 4/5 with lateral shoulder pain   Left Shoulder IR Strength 5/5 pain free   Left Lower Trapezius Strength 4/5   Clinical Impression   Criteria for Skilled Therapeutic Interventions Met yes, treatment indicated   PT Diagnosis Left GH primary impingement   Influenced by the following impairments Pain; weakness; impaired ROM; impaired posture   Functional limitations due to impairments Pain and difficulty reaching overhead, performing ADL's   Clinical Presentation Stable/Uncomplicated   Clinical Presentation Rationale (+) pain improving with rest; motivation  (-) chronicity   Clinical Decision Making (Complexity) Low complexity   Therapy Frequency other (see comments)   Predicted Duration of Therapy Intervention (days/wks) 1x every other week for 8 weeks   Risk & Benefits of therapy have been explained Yes   Patient, Family & other staff in agreement with plan of care Yes   Clinical Impression Comments Fco is a pleasant 67 yo male who presents today with !25 year history of left shoulder pain.  He will benefit from skilled PT.   Education Assessment   Preferred Learning Style Listening;Demonstration;Pictures/video   Barriers to Learning No barriers   ORTHO GOALS   PT Ortho Eval Goals 1;2;3   Ortho Goal 1    Goal Description Patient will be able to reach overhead without difficulty.   Target Date 09/26/18   Ortho Goal 2   Goal Description Patient will have 5/5 GH ER strength to reduce impingement with overhead ADL's.   Target Date 09/12/18   Ortho Goal 3   Goal Description Patient will be independent with his HEP to reduce future occurrence of pain and disability.   Target Date 08/01/18   Date Met 08/01/18   Total Evaluation Time   Total Evaluation Time 20 min   Therapy Certification   Certification date from 08/01/18   Certification date to 09/26/18   Medical Diagnosis Chronic left shoulder pain       Clarita Weber, PT, DPT  Doctor of Physical Therapy #[id number]  [facility]  [phone]  [email or web address]

## 2018-08-01 NOTE — PROGRESS NOTES
[facility]          OUTPATIENT PHYSICAL THERAPY ORTHOPEDIC EVALUATION  PLAN OF TREATMENT FOR OUTPATIENT REHABILITATION  (COMPLETE FOR INITIAL CLAIMS ONLY)  Patient's Last Name, First Name, M.I.  YOB: 1950  Fco Arroyo    Provider s Name:  [facility]   Medical Record No.  [id number]   Start of Care Date:  08/01/18   Onset Date:  05/01/18   Type:     _X__PT   ___OT   ___SLP Medical Diagnosis:  Chronic left shoulder pain     PT Diagnosis:  Left GH primary impingement   Visits from SOC:  1      _________________________________________________________________________________  Plan of Treatment/Functional Goals:  joint mobilization, manual therapy, motor coordination training, neuromuscular re-education, ROM, strengthening, stretching           Goals     Goal Description: Patient will be able to reach overhead without difficulty.  Target Date: 09/26/18       Goal Description: Patient will have 5/5 GH ER strength to reduce impingement with overhead ADL's.  Target Date: 09/12/18       Goal Description: Patient will be independent with his HEP to reduce future occurrence of pain and disability.  Target Date: 08/01/18           Therapy Frequency:  other (see comments)  Predicted Duration of Therapy Intervention:  1x every other week for 8 weeks    Clarita Weber, PT                 I CERTIFY THE NEED FOR THESE SERVICES FURNISHED UNDER        THIS PLAN OF TREATMENT AND WHILE UNDER MY CARE     (Physician co-signature of this document indicates review and certification of the therapy plan).                       Certification Date From:  08/01/18   Certification Date To:  09/26/18    Referring Provider:  Vannessa Valdivia MD    Initial Assessment        See Epic Evaluation Start of Care Date: 08/01/18

## 2018-08-28 ENCOUNTER — HOSPITAL ENCOUNTER (OUTPATIENT)
Dept: PHYSICAL THERAPY | Facility: CLINIC | Age: 68
Setting detail: THERAPIES SERIES
End: 2018-08-28
Attending: PEDIATRICS
Payer: MEDICARE

## 2018-08-28 PROCEDURE — G8985 CARRY GOAL STATUS: HCPCS | Mod: GP,CI | Performed by: PHYSICAL THERAPIST

## 2018-08-28 PROCEDURE — 97140 MANUAL THERAPY 1/> REGIONS: CPT | Mod: GP | Performed by: PHYSICAL THERAPIST

## 2018-08-28 PROCEDURE — 97110 THERAPEUTIC EXERCISES: CPT | Mod: GP | Performed by: PHYSICAL THERAPIST

## 2018-08-28 PROCEDURE — G8986 CARRY D/C STATUS: HCPCS | Mod: GP,CI | Performed by: PHYSICAL THERAPIST

## 2018-08-28 PROCEDURE — 40000718 ZZHC STATISTIC PT DEPARTMENT ORTHO VISIT: Performed by: PHYSICAL THERAPIST

## 2018-08-28 NOTE — PROGRESS NOTES
Outpatient Physical Therapy Discharge Note     Patient: Fco Arroyo  : 1950    Beginning/End Dates of Reporting Period:  18 to 2018    Referring Provider: Dr. Valdivia    Therapy Diagnosis: Left GH primary impingement     Client Self Report: Feels like the pain is down and the shoulder is more flexible.    Objective Measurements:  Objective Measure: Left GH AROM   Details: Pymfyxf=772 degrees pain free  Objective Measure: SPADI  Details: 10% disability (78% improvement from initial evaluation)         Goals:  Goal Identifier     Goal Description Patient will be able to reach overhead without difficulty.   Target Date 18   Date Met  18   Progress:     Goal Identifier Working independently towards this goal   Goal Description Patient will have 5/5 GH ER strength to reduce impingement with overhead ADL's.   Target Date 18   Date Met      Progress:     Goal Identifier     Goal Description Patient will be independent with his HEP to reduce future occurrence of pain and disability.   Target Date 18   Date Met  18   Progress:       Progress Toward Goals:   Progress this reporting period: Fco attended 2 PT sessions to address his left shoulder pain.  He made excellent progress with pain reduction, overhead AROM and PROM, and independence with his long term HEP.  He is independently working to progress rotator cuff and low trap strength.      Plan:  Discharge from therapy.    Discharge:    Reason for Discharge: Working independently towards final goals    Equipment Issued: Therlucyd    Discharge Plan: Patient to continue home program.    Clarita Weber, PT, DPT  Doctor of Physical Therapy #[id number]  [facility]  [phone]  [email or web address]

## 2018-10-15 ENCOUNTER — OFFICE VISIT (OUTPATIENT)
Dept: DERMATOLOGY | Facility: CLINIC | Age: 68
End: 2018-10-15
Payer: COMMERCIAL

## 2018-10-15 VITALS — HEART RATE: 86 BPM | DIASTOLIC BLOOD PRESSURE: 77 MMHG | OXYGEN SATURATION: 93 % | SYSTOLIC BLOOD PRESSURE: 125 MMHG

## 2018-10-15 DIAGNOSIS — L81.4 LENTIGO: ICD-10-CM

## 2018-10-15 DIAGNOSIS — L57.0 AK (ACTINIC KERATOSIS): ICD-10-CM

## 2018-10-15 DIAGNOSIS — L82.1 SK (SEBORRHEIC KERATOSIS): ICD-10-CM

## 2018-10-15 DIAGNOSIS — Z85.828 HISTORY OF SKIN CANCER: Primary | ICD-10-CM

## 2018-10-15 PROCEDURE — 17003 DESTRUCT PREMALG LES 2-14: CPT | Performed by: DERMATOLOGY

## 2018-10-15 PROCEDURE — 17000 DESTRUCT PREMALG LESION: CPT | Performed by: DERMATOLOGY

## 2018-10-15 PROCEDURE — 99213 OFFICE O/P EST LOW 20 MIN: CPT | Mod: 25 | Performed by: DERMATOLOGY

## 2018-10-15 NOTE — PROGRESS NOTES
Fco Arroyo is a 68 year old year old male patient here today for rough spot on nose.  .  Patient states this has been present for a while.  Patient reports the following symptoms:  none .  Patient reports the following previous treatments none.  Patient reports the following modifying factors none.  Associated symptoms: none.  Patient has no other skin complaints today.  Remainder of the HPI, Meds, PMH, Allergies, FH, and SH was reviewed in chart.      Past Medical History:   Diagnosis Date     DIABETES MELLITUS TYPE II-UNCOMPL 8/17/2005     Essential hypertension, benign     on meds, Stress Echo 1991 - normal, but suboptimal exertion     Other and unspecified hyperlipidemia      Other isolated or specific phobias     atenolol helps, wants open MRI     Other specified causes of urethral stricture     cystoscopy 1997 some hematuria in past - benign     Squamous cell carcinoma        Past Surgical History:   Procedure Laterality Date     SURGICAL HISTORY OF -   1998    open cholecystectomy for gallstones s/p ERCP     SURGICAL HISTORY OF -   1997    cystoscopy     SURGICAL HISTORY OF -   11/1997    Urethral stricture        Family History   Problem Relation Age of Onset     Diabetes Mother      Hypertension Mother      Hypertension Father      HEART DISEASE Brother      MI x2, CABG, 1st MI at 49-51 yo.      Hypertension Brother      C.A.D. Brother      Alzheimer Disease Brother      Diabetes Sister       maybe     Melanoma No family hx of        Social History     Social History     Marital status:      Spouse name: N/A     Number of children: N/A     Years of education: N/A     Occupational History     Not on file.     Social History Main Topics     Smoking status: Former Smoker     Types: Cigarettes     Smokeless tobacco: Never Used     Alcohol use Yes      Comment: little     Drug use: No     Sexual activity: Not on file     Other Topics Concern     Parent/Sibling W/ Cabg, Mi Or Angioplasty Before 65f  55m? Yes     Social History Narrative       Outpatient Encounter Prescriptions as of 10/15/2018   Medication Sig Dispense Refill     ASPIRIN 81 MG OR TABS 1 TABLET DAILY       blood glucose (ACCU-CHEK SMARTVIEW) test strip Use to test blood sugar 1 times daily or as directed. 50 strip 12     blood glucose monitoring (ACCU-CHEK FASTCLIX) lancets 1 each daily Use to test blood sugar 1 times daily or as directed. [address]     blood glucose monitoring (ACCU-CHEK SMARTVIEW) test strip Use to test blood sugar 2 times daily or as directed. 200 each 3     cinnamon 500 MG CAPS Take  by mouth.       glipiZIDE (GLIPIZIDE XL) 2.5 MG 24 hr tablet Take 1 tablet (2.5 mg) by mouth daily 90 tablet 3     glipiZIDE (GLUCOTROL XL) 2.5 MG 24 hr tablet TAKE 1 TABLET BY MOUTH ONCE DAILY; NEEDS TO BE SEEN FOR FURTHER REFILLS 30 tablet 0     glipiZIDE (GLUCOTROL XL) 2.5 MG 24 hr tablet TAKE ONE TABLET BY MOUTH ONCE DAILY; NEEDS TO BE SEEN FOR FURTHER REFILLS 30 tablet 0     hydrochlorothiazide (MICROZIDE) 12.5 MG capsule TAKE ONE CAPSULE BY MOUTH ONCE DAILY IN THE MORNING 90 capsule 3     lisinopril (PRINIVIL/ZESTRIL) 40 MG tablet Take 1 tablet (40 mg) by mouth daily 90 tablet 3     metFORMIN (GLUCOPHAGE) 1000 MG tablet Take 1 tablet (1,000 mg) by mouth 2 times daily (with meals) 180 tablet 3     metFORMIN (GLUCOPHAGE) 1000 MG tablet Take 1 tablet (1,000 mg) by mouth 2 times daily (with meals) 180 tablet 3     simvastatin (ZOCOR) 40 MG tablet TAKE ONE TABLET BY MOUTH AT BEDTIME 90 tablet 3     VITAMIN C 500 MG PO TABS 1 TABLET  DAILY       VITAMIN E daily       [DISCONTINUED] terbinafine (LAMISIL) 250 MG tablet Take 1 tablet (250 mg) by mouth daily (Patient not taking: Reported on 7/6/2018) 90 tablet 0     No facility-administered encounter medications on file as of 10/15/2018.              Review Of Systems  Skin: As above  Eyes: negative  Ears/Nose/Throat: negative  Respiratory: No shortness of breath, dyspnea on exertion, cough, or  hemoptysis  Cardiovascular: negative  Gastrointestinal: negative  Genitourinary: negative  Musculoskeletal: negative  Neurologic: negative  Psychiatric: negative  Hematologic/Lymphatic/Immunologic: negative  Endocrine: negative      O:   NAD, WDWN, Alert & Oriented, Mood & Affect wnl, Vitals stable   Here today alone   /77  Pulse 86  SpO2 93%   General appearance normal   Vitals stable   Alert, oriented and in no acute distress      Following lymph nodes palpated: Occipital, Cervical, Supraclavicular no lad   Stuck on papules and brown macules on trunk and ext    L NSW gritty papules x3         The remainder of the full exam was unremarkable; the following areas were examined:  conjunctiva/lids, oral mucosa, neck, peripheral vascular system, abdomen, lymph nodes, digits/nails, eccrine and apocrine glands, scalp/hair, face, neck, chest, abdomen, buttocks, back, RUE, LUE, RLE, LLE       Eyes: Conjunctivae/lids:Normal     ENT: Lips, buccal mucosa, tongue: normal    MSK:Normal    Cardiovascular: peripheral edema none    Pulm: Breathing Normal    Lymph Nodes: No Head and Neck Lymphadenopathy     Neuro/Psych: Orientation:Normal; Mood/Affect:Normal      A/P:  1. Hx of non-melanoma skin cancer, seborrheic keratosis, letnigo  2. Actinic keratosis x3 nose  LN2:  Treated with LN2 for 5s for 1-2 cycles. Warned risks of blistering, pain, pigment change, scarring, and incomplete resolution.  Advised patient to return if lesions do not completely resolve.  Wound care sheet given.  BENIGN LESIONS DISCUSSED WITH PATIENT:  I discussed the specifics of tumor, prognosis, and genetics of benign lesions.  I explained that treatment of these lesions would be purely cosmetic and not medically neccessary.  I discussed with patient different removal options including excision, cautery and /or laser.      Nature and genetics of benign skin lesions dicussed with patient.  Signs and Symptoms of skin cancer discussed with  patient.  ABCDEs of melanoma reviewed with patient.  Patient encouraged to perform monthly skin exams.  UV precautions reviewed with patient.  Patient to follow up with Primary Care provider regarding elevated blood pressure.  Skin care regimen reviewed with patient: Eliminate harsh soaps, i.e. Dial, zest, irsih spring; Mild soaps such as Cetaphil or Dove sensitive skin, avoid hot or cold showers, aggressive use of emollients including vanicream, cetaphil or cerave discussed with patient.    Risks of non-melanoma skin cancer discussed with patient   Return to clinic 12 months  Patient to follow up with Primary Care provider regarding elevated blood pressure.

## 2018-10-15 NOTE — MR AVS SNAPSHOT
After Visit Summary   10/15/2018    Fco Arroyo    MRN: [id number]           Patient Information     Date Of Birth          1950        Visit Information        Provider Department      10/15/2018 10:15 AM Javier Bueno MD [facility]        Care Instructions    WOUND CARE INSTRUCTIONS   FOR CRYOSURGERY   This area treated with liquid nitrogen should form a blister (areas treated may or may not blister-skin may just turn dark and slough off). You do not need to bandage the area unless a blister forms and breaks (which may be a few days). When the blister breaks, begin daily dressing changes as follows:  1) Clean and dry the area with tap water using clean Q-tip or sterile gauze pad.   2) Apply Polysporin ointment or Bacitracin ointment over entire wound. Do NOT use Neosporin ointment.   3) Cover the wound with a band-aid or sterile non-stick gauze pad and micropore paper tape.   REPEAT THESE INSTRUCTIONS AT LEAST ONCE A DAY UNTIL THE WOUND HAS COMPLETELY HEALED.   It is an old wives tale that a wound heals better when it is exposed to air and allowed to dry out. The wound will heal faster with a better cosmetic result if it is kept moist with ointment and covered with a bandage.   Do not let the wound dry out.   IMPORTANT INFORMATION ON REVERSE SIDE   Supplies Needed:   *Cotton tipped applicators (Q-tips)   *Polysporin ointment or Bacitracin ointment (NOT NEOSPORIN)   *Band-aids, or non stick gauze pads and micropore paper tape   PATIENT INFORMATION   During the healing process you will notice a number of changes. All wounds develop a small halo of redness surrounding the wound. This means healing is occurring. Severe itching with extensive redness usually indicates sensitivity to the ointment or bandage tape used to dress the wound. You should call our office if this develops.   Swelling and/or discoloration around your surgical site is common, particularly when  performed around the eye.   All wounds normally drain. The larger the wound the more drainage there will be. After 7-10 days, you will notice the wound beginning to shrink and new skin will begin to grow. The wound is healed when you can see skin has formed over the entire area. A healed wound has a healthy, shiny look to the surface and is red to dark pink in color to normalize. Wounds may take approximately 4-6 weeks to heal. Larger wounds may take 6-8 weeks. After the wound is healed you may discontinue dressing changes.   You may experience a sensation of tightness as your wound heals. This is normal and will gradually subside.   Your healed wound may be sensitive to temperature changes. This sensitivity improves with time, but if you re having a lot of discomfort, try to avoid temperature extremes.   Patients frequently experience itching after their wound appears to have healed because of the continue healing under the skin. Plain Vaseline will help relieve the itching.                 Follow-ups after your visit        Who to contact     If you have questions or need follow up information about today's clinic visit or your schedule please contact [facility] directly at [phone].  Normal or non-critical lab and imaging results will be communicated to you by MyChart, letter or phone within 4 business days after the clinic has received the results. If you do not hear from us within 7 days, please contact the clinic through MyChart or phone. If you have a critical or abnormal lab result, we will notify you by phone as soon as possible.  Submit refill requests through [employer] or call your pharmacy and they will forward the refill request to us. Please allow 3 business days for your refill to be completed.          Additional Information About Your Visit        Care EveryWhere ID     This is your Care EveryWhere ID. This could be used by other organizations to access your [facility]  records  [id number]        Your Vitals Were     Pulse Pulse Oximetry                86 93%           Blood Pressure from Last 3 Encounters:   10/15/18 125/77   07/06/18 153/88   06/12/18 136/76    Weight from Last 3 Encounters:   07/06/18 115.2 kg (254 lb)   06/12/18 115 kg (253 lb 9.6 oz)   12/28/17 115.9 kg (255 lb 9.6 oz)              Today, you had the following     No orders found for display       Primary Care Provider Office Phone # Fax #    Noaotis Ez Lan [phone] [address] [facility] [address]        Equal Access to Services     ALISSA MARCANO : Rani Arias, wadago robb, qaybta kaalmada britta, marine de la cruz. So [facility] [phone].    ATENCIÓN: Si habla español, tiene a sharif disposición servicios gratuitos de asistencia lingüística. Llame al [phone].    We comply with applicable federal civil rights laws and [place] laws. We do not discriminate on the basis of race, color, national origin, age, disability, sex, sexual orientation, or gender identity.            Thank you!     Thank you for choosing [facility]  for your care. Our goal is always to provide you with excellent care. Hearing back from our patients is one way we can continue to improve our services. Please take a few minutes to complete the written survey that you may receive in the mail after your visit with us. Thank you!             Your Updated Medication List - Protect others around you: Learn how to safely use, store and throw away your medicines at www.disposemymeds.org.          This list is accurate as of 10/15/18 10:36 AM.  Always use your most recent med list.                   Brand Name Dispense Instructions for use Diagnosis    ascorbic acid 500 MG tablet    VITAMIN C     1 TABLET  DAILY        aspirin 81 MG tablet      1 TABLET DAILY        blood glucose monitoring lancets     102 Box    1 each daily Use to test blood  sugar 1 times daily or as directed.    Type 2 diabetes, HbA1C goal < 8% (H)       * blood glucose monitoring test strip    ACCU-CHEK SMARTVIEW    50 strip    Use to test blood sugar 1 times daily or as directed.    Type 2 diabetes, HbA1C goal < 8% (H)       * blood glucose monitoring test strip    ACCU-CHEK SMARTVIEW    200 each    Use to test blood sugar 2 times daily or as directed.    Type 2 diabetes mellitus without complication (H)       cinnamon 500 MG Caps      Take  by mouth.        * glipiZIDE 2.5 MG 24 hr tablet    GLUCOTROL XL    30 tablet    TAKE ONE TABLET BY MOUTH ONCE DAILY; NEEDS TO BE SEEN FOR FURTHER REFILLS    Type 2 diabetes mellitus without complication, without long-term current use of insulin (H)       * glipiZIDE 2.5 MG 24 hr tablet    GLUCOTROL XL    30 tablet    TAKE 1 TABLET BY MOUTH ONCE DAILY; NEEDS TO BE SEEN FOR FURTHER REFILLS    Type 2 diabetes mellitus without complication, without long-term current use of insulin (H)       * glipiZIDE 2.5 MG 24 hr tablet    glipiZIDE XL    90 tablet    Take 1 tablet (2.5 mg) by mouth daily    Type 2 diabetes mellitus without complication, without long-term current use of insulin (H)       hydrochlorothiazide 12.5 MG capsule    MICROZIDE    90 capsule    TAKE ONE CAPSULE BY MOUTH ONCE DAILY IN THE MORNING    Benign essential hypertension       lisinopril 40 MG tablet    PRINIVIL/ZESTRIL    90 tablet    Take 1 tablet (40 mg) by mouth daily    Benign essential hypertension       * metFORMIN 1000 MG tablet    GLUCOPHAGE    180 tablet    Take 1 tablet (1,000 mg) by mouth 2 times daily (with meals)    Type 2 diabetes mellitus without complication, without long-term current use of insulin (H)       * metFORMIN 1000 MG tablet    GLUCOPHAGE    180 tablet    Take 1 tablet (1,000 mg) by mouth 2 times daily (with meals)    Type 2 diabetes mellitus without complication, without long-term current use of insulin (H)       simvastatin 40 MG tablet    ZOCOR    90  tablet    TAKE ONE TABLET BY MOUTH AT BEDTIME    Hyperlipidemia LDL goal <100       VITAMIN E      daily        * Notice:  This list has 7 medication(s) that are the same as other medications prescribed for you. Read the directions carefully, and ask your doctor or other care provider to review them with you.

## 2018-10-15 NOTE — LETTER
10/15/2018         RE: Fco Arroyo  [address]  [facility] [address]        Dear Colleague,    Thank you for referring your patient, Fco Arroyo, to the [facility]. Please see a copy of my visit note below.    Fco Arroyo is a 68 year old year old male patient here today for rough spot on nose.  .  Patient states this has been present for a while.  Patient reports the following symptoms:  none .  Patient reports the following previous treatments none.  Patient reports the following modifying factors none.  Associated symptoms: none.  Patient has no other skin complaints today.  Remainder of the HPI, Meds, PMH, Allergies, FH, and SH was reviewed in chart.      Past Medical History:   Diagnosis Date     DIABETES MELLITUS TYPE II-UNCOMPL 8/17/2005     Essential hypertension, benign     on meds, Stress Echo 1991 - normal, but suboptimal exertion     Other and unspecified hyperlipidemia      Other isolated or specific phobias     atenolol helps, wants open MRI     Other specified causes of urethral stricture     cystoscopy 1997 some hematuria in past - benign     Squamous cell carcinoma        Past Surgical History:   Procedure Laterality Date     SURGICAL HISTORY OF -   1998    open cholecystectomy for gallstones s/p ERCP     SURGICAL HISTORY OF -   1997    cystoscopy     SURGICAL HISTORY OF -   11/1997    Urethral stricture        Family History   Problem Relation Age of Onset     Diabetes Mother      Hypertension Mother      Hypertension Father      HEART DISEASE Brother      MI x2, CABG, 1st MI at 49-49 yo.      Hypertension Brother      C.A.D. Brother      Alzheimer Disease Brother      Diabetes Sister       maybe     Melanoma No family hx of        Social History     Social History     Marital status:      Spouse name: N/A     Number of children: N/A     Years of education: N/A     Occupational History     Not on file.     Social History Main Topics     Smoking  status: Former Smoker     Types: Cigarettes     Smokeless tobacco: Never Used     Alcohol use Yes      Comment: little     Drug use: No     Sexual activity: Not on file     Other Topics Concern     Parent/Sibling W/ Cabg, Mi Or Angioplasty Before 65f 55m? Yes     Social History Narrative       Outpatient Encounter Prescriptions as of 10/15/2018   Medication Sig Dispense Refill     ASPIRIN 81 MG OR TABS 1 TABLET DAILY       blood glucose (ACCU-CHEK SMARTVIEW) test strip Use to test blood sugar 1 times daily or as directed. 50 strip 12     blood glucose monitoring (ACCU-CHEK FASTCLIX) lancets 1 each daily Use to test blood sugar 1 times daily or as directed. 102 [address]     blood glucose monitoring (ACCU-CHEK SMARTVIEW) test strip Use to test blood sugar 2 times daily or as directed. 200 each 3     cinnamon 500 MG CAPS Take  by mouth.       glipiZIDE (GLIPIZIDE XL) 2.5 MG 24 hr tablet Take 1 tablet (2.5 mg) by mouth daily 90 tablet 3     glipiZIDE (GLUCOTROL XL) 2.5 MG 24 hr tablet TAKE 1 TABLET BY MOUTH ONCE DAILY; NEEDS TO BE SEEN FOR FURTHER REFILLS 30 tablet 0     glipiZIDE (GLUCOTROL XL) 2.5 MG 24 hr tablet TAKE ONE TABLET BY MOUTH ONCE DAILY; NEEDS TO BE SEEN FOR FURTHER REFILLS 30 tablet 0     hydrochlorothiazide (MICROZIDE) 12.5 MG capsule TAKE ONE CAPSULE BY MOUTH ONCE DAILY IN THE MORNING 90 capsule 3     lisinopril (PRINIVIL/ZESTRIL) 40 MG tablet Take 1 tablet (40 mg) by mouth daily 90 tablet 3     metFORMIN (GLUCOPHAGE) 1000 MG tablet Take 1 tablet (1,000 mg) by mouth 2 times daily (with meals) 180 tablet 3     metFORMIN (GLUCOPHAGE) 1000 MG tablet Take 1 tablet (1,000 mg) by mouth 2 times daily (with meals) 180 tablet 3     simvastatin (ZOCOR) 40 MG tablet TAKE ONE TABLET BY MOUTH AT BEDTIME 90 tablet 3     VITAMIN C 500 MG PO TABS 1 TABLET  DAILY       VITAMIN E daily       [DISCONTINUED] terbinafine (LAMISIL) 250 MG tablet Take 1 tablet (250 mg) by mouth daily (Patient not taking: Reported on 7/6/2018)  90 tablet 0     No facility-administered encounter medications on file as of 10/15/2018.              Review Of Systems  Skin: As above  Eyes: negative  Ears/Nose/Throat: negative  Respiratory: No shortness of breath, dyspnea on exertion, cough, or hemoptysis  Cardiovascular: negative  Gastrointestinal: negative  Genitourinary: negative  Musculoskeletal: negative  Neurologic: negative  Psychiatric: negative  Hematologic/Lymphatic/Immunologic: negative  Endocrine: negative      O:   NAD, WDWN, Alert & Oriented, Mood & Affect wnl, Vitals stable   Here today alone   /77  Pulse 86  SpO2 93%   General appearance normal   Vitals stable   Alert, oriented and in no acute distress      Following lymph nodes palpated: Occipital, Cervical, Supraclavicular no lad   Stuck on papules and brown macules on trunk and ext    L NSW gritty papules x3         The remainder of the full exam was unremarkable; the following areas were examined:  conjunctiva/lids, oral mucosa, neck, peripheral vascular system, abdomen, lymph nodes, digits/nails, eccrine and apocrine glands, scalp/hair, face, neck, chest, abdomen, buttocks, back, RUE, LUE, RLE, LLE       Eyes: Conjunctivae/lids:Normal     ENT: Lips, buccal mucosa, tongue: normal    MSK:Normal    Cardiovascular: peripheral edema none    Pulm: Breathing Normal    Lymph Nodes: No Head and Neck Lymphadenopathy     Neuro/Psych: Orientation:Normal; Mood/Affect:Normal      A/P:  1. Hx of non-melanoma skin cancer, seborrheic keratosis, letnigo  2. Actinic keratosis x3 nose  LN2:  Treated with LN2 for 5s for 1-2 cycles. Warned risks of blistering, pain, pigment change, scarring, and incomplete resolution.  Advised patient to return if lesions do not completely resolve.  Wound care sheet given.  BENIGN LESIONS DISCUSSED WITH PATIENT:  I discussed the specifics of tumor, prognosis, and genetics of benign lesions.  I explained that treatment of these lesions would be purely cosmetic and not  medically neccessary.  I discussed with patient different removal options including excision, cautery and /or laser.      Nature and genetics of benign skin lesions dicussed with patient.  Signs and Symptoms of skin cancer discussed with patient.  ABCDEs of melanoma reviewed with patient.  Patient encouraged to perform monthly skin exams.  UV precautions reviewed with patient.  Patient to follow up with Primary Care provider regarding elevated blood pressure.  Skin care regimen reviewed with patient: Eliminate harsh soaps, i.e. Dial, zest, irsih spring; Mild soaps such as Cetaphil or Dove sensitive skin, avoid hot or cold showers, aggressive use of emollients including vanicream, cetaphil or cerave discussed with patient.    Risks of non-melanoma skin cancer discussed with patient   Return to clinic 12 months  Patient to follow up with Primary Care provider regarding elevated blood pressure.        Again, thank you for allowing me to participate in the care of your patient.        Sincerely,        Javier Bueno MD

## 2018-10-15 NOTE — NURSING NOTE
"Initial /77  Pulse 86  SpO2 93% Estimated body mass index is 39.78 kg/(m^2) as calculated from the following:    Height as of 7/6/18: 1.702 m (5' 7\").    Weight as of 7/6/18: 115.2 kg (254 lb). .    Kaela Salgado LPN    "

## 2019-05-09 NOTE — NURSING NOTE
"Chief Complaint   Patient presents with     Consult     salivary gland swelling - seems better since finishing Abx.      Jaw Pain     RT side       Initial BP (!) 150/91 (BP Location: Right arm, Patient Position: Chair, Cuff Size: Adult Large)  Pulse 88  Resp 14 Estimated body mass index is 37.13 kg/(m^2) as calculated from the following:    Height as of 4/13/17: 5' 8.8\" (1.748 m).    Weight as of 4/13/17: 250 lb (113.4 kg).  Medication Reconciliation: complete     Michelle Giron MA      " Detail Level: Simple Quality 226: Preventive Care And Screening: Tobacco Use: Screening And Cessation Intervention: Patient screened for tobacco use and is an ex/non-smoker

## 2019-06-13 ENCOUNTER — OFFICE VISIT (OUTPATIENT)
Dept: FAMILY MEDICINE | Facility: CLINIC | Age: 69
End: 2019-06-13
Payer: COMMERCIAL

## 2019-06-13 VITALS
TEMPERATURE: 97 F | HEART RATE: 81 BPM | BODY MASS INDEX: 38.77 KG/M2 | WEIGHT: 247 LBS | RESPIRATION RATE: 14 BRPM | OXYGEN SATURATION: 95 % | DIASTOLIC BLOOD PRESSURE: 76 MMHG | SYSTOLIC BLOOD PRESSURE: 120 MMHG | HEIGHT: 67 IN

## 2019-06-13 DIAGNOSIS — E11.9 TYPE 2 DIABETES MELLITUS WITHOUT COMPLICATION, WITHOUT LONG-TERM CURRENT USE OF INSULIN (H): ICD-10-CM

## 2019-06-13 DIAGNOSIS — F41.9 ANXIETY: ICD-10-CM

## 2019-06-13 DIAGNOSIS — I10 BENIGN ESSENTIAL HYPERTENSION: ICD-10-CM

## 2019-06-13 DIAGNOSIS — Z00.00 ENCOUNTER FOR ROUTINE ADULT HEALTH EXAMINATION WITHOUT ABNORMAL FINDINGS: Primary | ICD-10-CM

## 2019-06-13 DIAGNOSIS — E78.5 HYPERLIPIDEMIA LDL GOAL <100: ICD-10-CM

## 2019-06-13 DIAGNOSIS — B35.9 TINEA: ICD-10-CM

## 2019-06-13 DIAGNOSIS — E66.01 MORBID OBESITY (H): ICD-10-CM

## 2019-06-13 DIAGNOSIS — H90.6 MIXED CONDUCTIVE AND SENSORINEURAL HEARING LOSS OF BOTH EARS: ICD-10-CM

## 2019-06-13 LAB
ALBUMIN SERPL-MCNC: 3.9 G/DL (ref 3.4–5)
ALP SERPL-CCNC: 63 U/L (ref 40–150)
ALT SERPL W P-5'-P-CCNC: 31 U/L (ref 0–70)
ANION GAP SERPL CALCULATED.3IONS-SCNC: 4 MMOL/L (ref 3–14)
AST SERPL W P-5'-P-CCNC: 19 U/L (ref 0–45)
BILIRUB SERPL-MCNC: 0.7 MG/DL (ref 0.2–1.3)
BUN SERPL-MCNC: 21 MG/DL (ref 7–30)
CALCIUM SERPL-MCNC: 9.5 MG/DL (ref 8.5–10.1)
CHLORIDE SERPL-SCNC: 104 MMOL/L (ref 94–109)
CHOLEST SERPL-MCNC: 216 MG/DL
CO2 SERPL-SCNC: 28 MMOL/L (ref 20–32)
CREAT SERPL-MCNC: 0.93 MG/DL (ref 0.66–1.25)
CREAT UR-MCNC: 273 MG/DL
GFR SERPL CREATININE-BSD FRML MDRD: 83 ML/MIN/{1.73_M2}
GLUCOSE SERPL-MCNC: 161 MG/DL (ref 70–99)
HBA1C MFR BLD: 7 % (ref 0–5.6)
HDLC SERPL-MCNC: 41 MG/DL
LDLC SERPL CALC-MCNC: 141 MG/DL
MICROALBUMIN UR-MCNC: 23 MG/L
MICROALBUMIN/CREAT UR: 8.53 MG/G CR (ref 0–17)
NONHDLC SERPL-MCNC: 175 MG/DL
POTASSIUM SERPL-SCNC: 3.9 MMOL/L (ref 3.4–5.3)
PROT SERPL-MCNC: 7.4 G/DL (ref 6.8–8.8)
SODIUM SERPL-SCNC: 136 MMOL/L (ref 133–144)
TRIGL SERPL-MCNC: 170 MG/DL

## 2019-06-13 PROCEDURE — 80061 LIPID PANEL: CPT | Performed by: FAMILY MEDICINE

## 2019-06-13 PROCEDURE — 36415 COLL VENOUS BLD VENIPUNCTURE: CPT | Performed by: FAMILY MEDICINE

## 2019-06-13 PROCEDURE — 99214 OFFICE O/P EST MOD 30 MIN: CPT | Mod: 25 | Performed by: FAMILY MEDICINE

## 2019-06-13 PROCEDURE — 99397 PER PM REEVAL EST PAT 65+ YR: CPT | Performed by: FAMILY MEDICINE

## 2019-06-13 PROCEDURE — 82043 UR ALBUMIN QUANTITATIVE: CPT | Performed by: FAMILY MEDICINE

## 2019-06-13 PROCEDURE — 80053 COMPREHEN METABOLIC PANEL: CPT | Performed by: FAMILY MEDICINE

## 2019-06-13 PROCEDURE — 83036 HEMOGLOBIN GLYCOSYLATED A1C: CPT | Performed by: FAMILY MEDICINE

## 2019-06-13 RX ORDER — HYDROCHLOROTHIAZIDE 12.5 MG/1
CAPSULE ORAL
Qty: 90 CAPSULE | Refills: 3 | Status: SHIPPED | OUTPATIENT
Start: 2019-06-13 | End: 2020-02-25

## 2019-06-13 RX ORDER — GLIPIZIDE 2.5 MG/1
2.5 TABLET, EXTENDED RELEASE ORAL DAILY
Qty: 90 TABLET | Refills: 3 | Status: SHIPPED | OUTPATIENT
Start: 2019-06-13 | End: 2020-02-25

## 2019-06-13 RX ORDER — CICLOPIROX 80 MG/ML
SOLUTION TOPICAL
Qty: 300 ML | Refills: 3 | Status: SHIPPED | OUTPATIENT
Start: 2019-06-13 | End: 2020-06-05

## 2019-06-13 RX ORDER — LISINOPRIL 40 MG/1
40 TABLET ORAL DAILY
Qty: 90 TABLET | Refills: 3 | Status: SHIPPED | OUTPATIENT
Start: 2019-06-13 | End: 2020-02-25

## 2019-06-13 RX ORDER — SIMVASTATIN 40 MG
TABLET ORAL
Qty: 90 TABLET | Refills: 3 | Status: SHIPPED | OUTPATIENT
Start: 2019-06-13 | End: 2020-02-25

## 2019-06-13 RX ORDER — HYDROXYZINE HYDROCHLORIDE 10 MG/1
10 TABLET, FILM COATED ORAL 3 TIMES DAILY PRN
Qty: 60 TABLET | Refills: 1 | Status: SHIPPED | OUTPATIENT
Start: 2019-06-13 | End: 2020-06-05

## 2019-06-13 ASSESSMENT — ACTIVITIES OF DAILY LIVING (ADL): CURRENT_FUNCTION: NO ASSISTANCE NEEDED

## 2019-06-13 ASSESSMENT — MIFFLIN-ST. JEOR: SCORE: 1839.01

## 2019-06-13 NOTE — PROGRESS NOTES
SUBJECTIVE:   Fco Arroyo is a 69 year old male who presents for Preventive Visit.        Patient is a 72-year-old male who comes in for his annual physical.  He has a past medical history significant for type 2 diabetes, morbid obesity, generalized anxiety disorder which has worsened in the last year or so.  Patient reports that he gets very claustrophobic in big crowds and  symptoms appears to be getting worse.  He has not been on medication to help with the anxiety.  He needs medication refills on all his meds.  His diabetes is somewhat controlled A1c is 7 today which is unchanged from the last check.    He also mentioned that he would like a referral for a hearing evaluation he used to be on hearing aids but the area and he and not working as they used to.    Patient also mentions that he has had some mild cognitive memory issues in the last year as noticed by his loved ones .    Are you in the first 12 months of your Medicare coverage?  No    Healthy Habits:     In general, how would you rate your overall health?  Good    Frequency of exercise:  1 day/week    Duration of exercise:  Less than 15 minutes    Taking medications regularly:  Yes    Barriers to taking medications:  None    Medication side effects:  None    Ability to successfully perform activities of daily living:  No assistance needed    Home Safety:  Lack of grab bars in the bathroom    Hearing Impairment:  Difficulty following a conversation in a noisy restaurant or crowded room, feel that people are mumbling or not speaking clearly, need to ask people to speak up or repeat themselves, find that men's voices are easier to understand than woman's and difficulty understanding soft or whispered speech    In the past 6 months, have you been bothered by leaking of urine? Yes    In general, how would you rate your overall mental or emotional health?  Fair      PHQ-2 Total Score: 0    Additional concerns today:  Yes    Do you feel safe in your  environment? Yes    Do you have a Health Care Directive? No: Advance care planning reviewed with patient; information given to patient to review.      Fall risk  Fallen 2 or more times in the past year?: No  Any fall with injury in the past year?: No    Cognitive Screening   1) Repeat 3 items (Leader, Season, Table)    2) Clock draw: ABNORMAL cannot draw clock   3) 3 item recall: Recalls 1 object   Results: ABNORMAL clock, 1-2 items recalled: PROBABLE COGNITIVE IMPAIRMENT, **INFORM PROVIDER**    Mini-CogTM Copyright TESS[employer] Wayne. Licensed by the author for use in [facility]; reprinted with permission (truong@[facility]). All rights reserved.      Do you have sleep apnea, excessive snoring or daytime drowsiness?: yes    Reviewed and updated as needed this visit by clinical staff  Tobacco  Allergies  Meds  Problems  Med Hx  Surg Hx  Fam Hx  Soc Hx          Reviewed and updated as needed this visit by Provider  Tobacco  Allergies  Meds  Problems  Med Hx  Surg Hx  Fam Hx        Social History     Tobacco Use     Smoking status: Former Smoker     Types: Cigarettes     Smokeless tobacco: Never Used   Substance Use Topics     Alcohol use: Yes     Comment: little     If you drink alcohol do you typically have >3 drinks per day or >7 drinks per week? No    Alcohol Use 6/13/2019   Prescreen: >3 drinks/day or >7 drinks/week? No     Diabetes Follow-up      How often are you checking your blood sugar? A few times a month    What time of day are you checking your blood sugars (select all that apply)?  Before meals 90s to 100s    Have you had any blood sugars above 200?  No    Have you had any blood sugars below 70?  No    What symptoms do you notice when your blood sugar is low?  Dizzy    What concerns do you have today about your diabetes? None     Do you have any of these symptoms? (Select all that apply)  Numbness in feet     Have you had a diabetic eye exam in the last 12 months? No    Diabetes Management  Resources    Hyperlipidemia Follow-Up      Are you having any of the following symptoms? (Select all that apply)  No complaints of shortness of breath, chest pain or pressure.  No increased sweating or nausea with activity.  No left-sided neck or arm pain.  No complaints of pain in calves when walking 1-2 blocks.    Are you regularly taking any medication or supplement to lower your cholesterol?   Yes- simvastatin    Are you having muscle aches or other side effects that you think could be caused by your cholesterol lowering medication?  No    Hypertension Follow-up      Do you check your blood pressure regularly outside of the clinic? No     Are you following a low salt diet? Yes    Are your blood pressures ever more than 140 on the top number (systolic) OR more   than 90 on the bottom number (diastolic), for example 140/90? No    BP Readings from Last 2 Encounters:   06/13/19 120/76   10/15/18 125/77     Hemoglobin A1C (%)   Date Value   05/31/2018 7.0 (H)   05/10/2017 6.6 (H)     LDL Cholesterol Calculated (mg/dL)   Date Value   05/31/2018 83   11/02/2016 66       Amount of exercise or physical activity: 1 day/week for an average of less than 15 minutes    Problems taking medications regularly: No    Medication side effects: none    Diet: low salt      Medication Followup of all medication listed in      Taking Medication as prescribed: yes    Side Effects:  None    Medication Helping Symptoms:  yes         Abnormal Mood Symptoms  Onset: had in the past increased in the last yr     Description: Patient has had claustrophobia in the past but has increased in the last. Patient appt last yr for a MRI could not do the test due to claustrophobia    Depression: no  Anxiety: YES- claustrophobic     Accompanying Signs & Symptoms:  Still participating in activities that you used to enjoy: YES  Fatigue: no   Irritability: YES  Difficulty concentrating: no   Changes in appetite: no   Problems with sleep:  "YES  Heart racing/beating fast : no  Thoughts of hurting yourself or others: none    History:   Recent stress: no  Prior depression hospitalization: None  Family history of depression: no  Family history of anxiety: no    Precipitating factors:   Alcohol/drug use: no    Alleviating factors:  Rest, watching TV taking his mind off thing       Therapies Tried and outcome: patient was given something in the past only 4 pills     Current providers sharing in care for this patient include:   Patient Care Team:  Virginie Lan MD as PCP - General (Family Practice)  Virginie Lan MD as Assigned PCP    The following health maintenance items are reviewed in Epic and correct as of today:  Health Maintenance   Topic Date Due     HEPATITIS C SCREENING  1950     EYE EXAM  1950     COLONOSCOPY  02/05/1959     ZOSTER IMMUNIZATION (1 of 2) 02/05/1999     AORTIC ANEURYSM SCREENING (SYSTEM ASSIGNED)  02/05/2014     MEDICARE ANNUAL WELLNESS VISIT  02/13/2014     ADVANCED DIRECTIVE PLANNING  02/13/2018     DTAP/TDAP/TD IMMUNIZATION (3 - Td) 04/21/2018     TSH W/FREE T4 REFLEX  11/16/2018     DIABETIC FOOT EXAM  06/12/2019     INFLUENZA VACCINE (Season Ended) 09/01/2019     A1C  12/13/2019     BMP  06/13/2020     LIPID  06/13/2020     MICROALBUMIN  06/13/2020     PHQ-2  Completed     IPV IMMUNIZATION  Aged Out     MENINGITIS IMMUNIZATION  Aged Out       Review of Systems  Constitutional, HEENT, cardiovascular, pulmonary, gi and gu systems are negative, except as otherwise noted.    OBJECTIVE:   /76   Pulse 81   Temp 97  F (36.1  C)   Resp 14   Ht 1.702 m (5' 7\")   Wt 112 kg (247 lb)   SpO2 95%   BMI 38.69 kg/m   Estimated body mass index is 38.69 kg/m  as calculated from the following:    Height as of this encounter: 1.702 m (5' 7\").    Weight as of this encounter: 112 kg (247 lb).  Physical Exam  GENERAL: healthy, alert and no distress  EYES: Eyes grossly normal to inspection, PERRL and " conjunctivae and sclerae normal  HENT: ear canals and TM's normal, nose and mouth without ulcers or lesions  NECK: no adenopathy, no asymmetry, masses, or scars and thyroid normal to palpation  RESP: lungs clear to auscultation - no rales, rhonchi or wheezes  CV: regular rate and rhythm, normal S1 S2, no S3 or S4, no murmur, click or rub, no peripheral edema and peripheral pulses strong  ABDOMEN: soft, nontender, no hepatosplenomegaly, no masses and bowel sounds normal  MS: no gross musculoskeletal defects noted, no edema  SKIN: no suspicious lesions or rashes  NEURO: Normal strength and tone, mentation intact and speech normal  PSYCH: mentation appears normal, affect normal/bright    Diagnostic Test Results:  Results for orders placed or performed in visit on 06/13/19   Lipid panel reflex to direct LDL Fasting   Result Value Ref Range    Cholesterol 216 (H) <200 mg/dL    Triglycerides 170 (H) <150 mg/dL    HDL Cholesterol 41 >39 mg/dL    LDL Cholesterol Calculated 141 (H) <100 mg/dL    Non HDL Cholesterol 175 (H) <130 mg/dL   Hemoglobin A1c   Result Value Ref Range    Hemoglobin A1C 7.0 (H) 0 - 5.6 %   Albumin Random Urine Quantitative with Creat Ratio   Result Value Ref Range    Creatinine Urine 273 mg/dL    Albumin Urine mg/L 23 mg/L    Albumin Urine mg/g Cr 8.53 0 - 17 mg/g Cr   Comprehensive metabolic panel (BMP + Alb, Alk Phos, ALT, AST, Total. Bili, TP)   Result Value Ref Range    Sodium 136 133 - 144 mmol/L    Potassium 3.9 3.4 - 5.3 mmol/L    Chloride 104 94 - 109 mmol/L    Carbon Dioxide 28 20 - 32 mmol/L    Anion Gap 4 3 - 14 mmol/L    Glucose 161 (H) 70 - 99 mg/dL    Urea Nitrogen 21 7 - 30 mg/dL    Creatinine 0.93 0.66 - 1.25 mg/dL    GFR Estimate 83 >60 mL/min/[1.73_m2]    GFR Estimate If Black >90 >60 mL/min/[1.73_m2]    Calcium 9.5 8.5 - 10.1 mg/dL    Bilirubin Total 0.7 0.2 - 1.3 mg/dL    Albumin 3.9 3.4 - 5.0 g/dL    Protein Total 7.4 6.8 - 8.8 g/dL    Alkaline Phosphatase 63 40 - 150 U/L    ALT 31  0 - 70 U/L    AST 19 0 - 45 U/L       ASSESSMENT / PLAN:   (Z00.00) Encounter for routine adult health examination without abnormal findings  (primary encounter diagnosis)  Comment: Patient comes in for his annual physical , overall doing relatively well. Addressed other concerns  Plan: As above    (E11.9) Type 2 diabetes mellitus without complication, without long-term current use of insulin (H)  Comment: Diabetes stable A1C same  Plan: glipiZIDE (GLIPIZIDE XL) 2.5 MG 24 hr tablet,         metFORMIN (GLUCOPHAGE) 1000 MG tablet,         Hemoglobin A1c, Albumin Random Urine         Quantitative with Creat Ratio            (I10) Benign essential hypertension  Comment: BP is within normal range  Plan: hydrochlorothiazide (MICROZIDE) 12.5 MG         capsule, lisinopril (PRINIVIL/ZESTRIL) 40 MG         tablet, Comprehensive metabolic panel (BMP +         Alb, Alk Phos, ALT, AST, Total. Bili, TP)            (E78.5) Hyperlipidemia LDL goal <100  Comment: Medication faxed . Labs ordered  Plan: simvastatin (ZOCOR) 40 MG tablet, Lipid panel         reflex to direct LDL Fasting            (F41.9) Anxiety  Comment: started patient on medication for anxiety  Plan: hydrOXYzine (ATARAX) 10 MG tablet            (H90.6) Mixed conductive and sensorineural hearing loss of both ears  Comment: referral for hearing evaluation   Plan: AUDIOLOGY ADULT REFERRAL, OTOLARYNGOLOGY         REFERRAL            (B35.9) Tinea  Comment: c/o of fungal nail infection  Plan: ciclopirox (PENLAC) 8 % external solution         (E66.01) Morbid obesity (H)  Comment: continue to work on weight   Plan: As above    End of Life Planning:  Patient currently has an advanced directive: Yes.  Practitioner is supportive of decision.    COUNSELING:  Reviewed preventive health counseling, as reflected in patient instructions       Regular exercise       Healthy diet/nutrition       Vision screening    Estimated body mass index is 38.69 kg/m  as calculated from the  "following:    Height as of this encounter: 1.702 m (5' 7\").    Weight as of this encounter: 112 kg (247 lb).    Weight management plan: Discussed healthy diet and exercise guidelines     reports that he has quit smoking. His smoking use included cigarettes. He has never used smokeless tobacco.      Appropriate preventive services were discussed with this patient, including applicable screening as appropriate for cardiovascular disease, diabetes, osteopenia/osteoporosis, and glaucoma.  As appropriate for age/gender, discussed screening for colorectal cancer, prostate cancer, breast cancer, and cervical cancer. Checklist reviewing preventive services available has been given to the patient.    Reviewed patients plan of care and provided an AVS. The Basic Care Plan (routine screening as documented in Health Maintenance) for Fco meets the Care Plan requirement. This Care Plan has been established and reviewed with the Patient.    Counseling Resources:  ATP IV Guidelines  Pooled Cohorts Equation Calculator  Breast Cancer Risk Calculator  FRAX Risk Assessment  ICSI Preventive Guidelines  Dietary Guidelines for Americans, 2010  USDA's MyPlate  ASA Prophylaxis  Lung CA Screening    Virginie Lan MD  [facility] [place]    Identified Health Risks:  "

## 2019-06-13 NOTE — PROGRESS NOTES
Subjective     Fco Arroyo is a 69 year old male who presents to clinic today for the following health issues:    HPI   Diabetes Follow-up      How often are you checking your blood sugar? A few times a month    What time of day are you checking your blood sugars (select all that apply)?  Before meals 90s to 100s    Have you had any blood sugars above 200?  No    Have you had any blood sugars below 70?  No    What symptoms do you notice when your blood sugar is low?  Dizzy    What concerns do you have today about your diabetes? None     Do you have any of these symptoms? (Select all that apply)  Numbness in feet     Have you had a diabetic eye exam in the last 12 months? No    Diabetes Management Resources    Hyperlipidemia Follow-Up      Are you having any of the following symptoms? (Select all that apply)  No complaints of shortness of breath, chest pain or pressure.  No increased sweating or nausea with activity.  No left-sided neck or arm pain.  No complaints of pain in calves when walking 1-2 blocks.    Are you regularly taking any medication or supplement to lower your cholesterol?   Yes- simvastatin    Are you having muscle aches or other side effects that you think could be caused by your cholesterol lowering medication?  No    Hypertension Follow-up      Do you check your blood pressure regularly outside of the clinic? No     Are you following a low salt diet? Yes    Are your blood pressures ever more than 140 on the top number (systolic) OR more   than 90 on the bottom number (diastolic), for example 140/90? No    BP Readings from Last 2 Encounters:   06/13/19 120/76   10/15/18 125/77     Hemoglobin A1C (%)   Date Value   05/31/2018 7.0 (H)   05/10/2017 6.6 (H)     LDL Cholesterol Calculated (mg/dL)   Date Value   05/31/2018 83   11/02/2016 66       Amount of exercise or physical activity: 1 day/week for an average of less than 15 minutes    Problems taking medications regularly: No    Medication  "side effects: none    Diet: low salt      Medication Followup of all medication listed in      Taking Medication as prescribed: yes    Side Effects:  None    Medication Helping Symptoms:  yes     {additonal problems for provider to add (Optional):701962}    {HIST REVIEW/ LINKS 2 (Optional):688746}    {Additional problems for the provider to add (optional):174281}  Reviewed and updated as needed this visit by Provider         Review of Systems   {ROS COMP (Optional):773752}      Objective    /76   Pulse 81   Temp 97  F (36.1  C)   Resp 14   Ht 1.702 m (5' 7\")   Wt 112 kg (247 lb)   SpO2 95%   BMI 38.69 kg/m    Body mass index is 38.69 kg/m .  Physical Exam   {Exam List (Optional):185322}    {Diagnostic Test Results (Optional):048994::\"Diagnostic Test Results:\",\"Labs reviewed in Epic\"}        {PROVIDER CHARTING PREFERENCE:318093}      "

## 2019-06-13 NOTE — PATIENT INSTRUCTIONS
Patient Education   Personalized Prevention Plan  You are due for the preventive services outlined below.  Your care team is available to assist you in scheduling these services.  If you have already completed any of these items, please share that information with your care team to update in your medical record.  Health Maintenance Due   Topic Date Due     Hepatitis C Screening  1950     Eye Exam  1950     Colonscopy  02/05/1959     Zoster (Shingles) Vaccine (1 of 2) 02/05/1999     AORTIC ANEURYSM SCREENING (SYSTEM ASSIGNED)  02/05/2014     Annual Wellness Visit  02/13/2014     Discuss Advance Directive Planning  02/13/2018     Diptheria Tetanus Pertussis (DTAP/TDAP/TD) Vaccine (3 - Td) 04/21/2018     Thyroid Function Lab  11/16/2018     A1C Lab  11/30/2018     PHQ-2  01/01/2019     Basic Metabolic Panel  05/31/2019     Cholesterol Lab  05/31/2019     Kidney Function Urine Test  06/12/2019     Diabetic Foot Exam  06/12/2019

## 2019-06-13 NOTE — LETTER
June 25, 2019      Fco Arroyo  [address]  [facility] [address]        Dear ,    We are writing to inform you of your test results.  Please inform patient that his cholesterol labs were much worse than the last check . He is to continue with the Zocor and also to work on diet and exercise.   Other labs were within normal limits.   Thanks  Dr Edyta Celaya Orders   Lipid panel reflex to direct LDL Fasting   Result Value Ref Range    Cholesterol 216 (H) <200 mg/dL      Comment:      Desirable:       <200 mg/dl    Triglycerides 170 (H) <150 mg/dL      Comment:      Borderline high:  150-199 mg/dl  High:             200-499 mg/dl  Very high:       >499 mg/dl  Fasting specimen      HDL Cholesterol 41 >39 mg/dL    LDL Cholesterol Calculated 141 (H) <100 mg/dL      Comment:      Above desirable:  100-129 mg/dl  Borderline High:  130-159 mg/dL  High:             160-189 mg/dL  Very high:       >189 mg/dl      Non HDL Cholesterol 175 (H) <130 mg/dL      Comment:      Above Desirable:  130-159 mg/dl  Borderline high:  160-189 mg/dl  High:             190-219 mg/dl  Very high:       >219 mg/dl     Hemoglobin A1c   Result Value Ref Range    Hemoglobin A1C 7.0 (H) 0 - 5.6 %      Comment:      Normal <5.7% Prediabetes 5.7-6.4%  Diabetes 6.5% or higher - adopted from ADA   consensus guidelines.     Albumin Random Urine Quantitative with Creat Ratio   Result Value Ref Range    Creatinine Urine 273 mg/dL    Albumin Urine mg/L 23 mg/L    Albumin Urine mg/g Cr 8.53 0 - 17 mg/g Cr   Comprehensive metabolic panel (BMP + Alb, Alk Phos, ALT, AST, Total. Bili, TP)   Result Value Ref Range    Sodium 136 133 - 144 mmol/L    Potassium 3.9 3.4 - 5.3 mmol/L    Chloride 104 94 - 109 mmol/L    Carbon Dioxide 28 20 - 32 mmol/L    Anion Gap 4 3 - 14 mmol/L    Glucose 161 (H) 70 - 99 mg/dL      Comment:      Fasting specimen    Urea Nitrogen 21 7 - 30 mg/dL    Creatinine 0.93 0.66 - 1.25 mg/dL    GFR Estimate 83 >60  mL/min/[1.73_m2]      Comment:      Non  GFR Calc  Starting 12/18/2018, serum creatinine based estimated GFR (eGFR) will be   calculated using the Chronic Kidney Disease Epidemiology Collaboration   (CKD-EPI) equation.      GFR Estimate If Black >90 >60 mL/min/[1.73_m2]      Comment:       GFR Calc  Starting 12/18/2018, serum creatinine based estimated GFR (eGFR) will be   calculated using the Chronic Kidney Disease Epidemiology Collaboration   (CKD-EPI) equation.      Calcium 9.5 8.5 - 10.1 mg/dL    Bilirubin Total 0.7 0.2 - 1.3 mg/dL    Albumin 3.9 3.4 - 5.0 g/dL    Protein Total 7.4 6.8 - 8.8 g/dL    Alkaline Phosphatase 63 40 - 150 U/L    ALT 31 0 - 70 U/L    AST 19 0 - 45 U/L       If you have any questions or concerns, please call the clinic at the number listed above.       Sincerely,        Virginie Lan MD

## 2019-07-18 ENCOUNTER — TRANSFERRED RECORDS (OUTPATIENT)
Dept: HEALTH INFORMATION MANAGEMENT | Facility: CLINIC | Age: 69
End: 2019-07-18

## 2019-07-25 ENCOUNTER — OFFICE VISIT (OUTPATIENT)
Dept: OTOLARYNGOLOGY | Facility: CLINIC | Age: 69
End: 2019-07-25
Payer: COMMERCIAL

## 2019-07-25 ENCOUNTER — OFFICE VISIT (OUTPATIENT)
Dept: AUDIOLOGY | Facility: CLINIC | Age: 69
End: 2019-07-25
Payer: COMMERCIAL

## 2019-07-25 VITALS
HEART RATE: 81 BPM | DIASTOLIC BLOOD PRESSURE: 78 MMHG | BODY MASS INDEX: 38.77 KG/M2 | OXYGEN SATURATION: 98 % | SYSTOLIC BLOOD PRESSURE: 146 MMHG | WEIGHT: 247 LBS | HEIGHT: 67 IN

## 2019-07-25 DIAGNOSIS — H90.3 SENSORINEURAL HEARING LOSS (SNHL) OF BOTH EARS: Primary | ICD-10-CM

## 2019-07-25 DIAGNOSIS — H90.3 SENSORINEURAL HEARING LOSS, BILATERAL: Primary | ICD-10-CM

## 2019-07-25 PROCEDURE — 92550 TYMPANOMETRY & REFLEX THRESH: CPT | Performed by: AUDIOLOGIST

## 2019-07-25 PROCEDURE — 99213 OFFICE O/P EST LOW 20 MIN: CPT | Performed by: OTOLARYNGOLOGY

## 2019-07-25 PROCEDURE — 92557 COMPREHENSIVE HEARING TEST: CPT | Performed by: AUDIOLOGIST

## 2019-07-25 PROCEDURE — 99207 ZZC NO CHARGE LOS: CPT | Performed by: AUDIOLOGIST

## 2019-07-25 ASSESSMENT — MIFFLIN-ST. JEOR: SCORE: 1839.01

## 2019-07-25 NOTE — LETTER
7/25/2019         RE: Fco Arroyo  [address]  [facility] [address]        Dear Colleague,    Thank you for referring your patient, Fco Arroyo, to the [facility]. Please see a copy of my visit note below.    Chief Complaint - Hearing loss    History of Present Illness - Fco Arroyo is a 69 year old male who presents to me today with hearing loss in both ears.  It has been present and noticeable for approximately years.  It was gradual in onset, worsening.  The patient has noticed increased difficulty hearing certain sounds and difficulty in understanding others, especially in noisy or crowded situations.  There is no history ofchronic ear disease or ear surgery.  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 + family history of hearing loss. The patient denies otorrhea, otalgia. Use to wear a HA, right-side, but it broke.     Past Medical History -   Patient Active Problem List   Diagnosis     R PLANTAR FIBROMATOSIS     Sensorineural hearing loss, bilateral     Hyperlipidemia LDL goal <100     Advanced directives, counseling/discussion     Morbid obesity (H)     Benign essential hypertension     Type 2 diabetes mellitus without complication, without long-term current use of insulin (H)       Current Medications -   Current Outpatient Medications:      ASPIRIN 81 MG OR TABS, 1 TABLET DAILY, Disp: , Rfl:      blood glucose (ACCU-CHEK SMARTVIEW) test strip, Use to test blood sugar 1 times daily or as directed., Disp: 50 strip, Rfl: 12     blood glucose monitoring (ACCU-CHEK FASTCLIX) lancets, 1 each daily Use to test blood sugar 1 times daily or as directed., Disp: 102 Box, Rfl: 12     blood glucose monitoring (ACCU-CHEK SMARTVIEW) test strip, Use to test blood sugar 2 times daily or as directed., Disp: 200 each, Rfl: 3     ciclopirox (PENLAC) 8 % external solution, Apply to adjacent skin and affected nails daily.  Remove  with alcohol every 7 days, then repeat., Disp: 300 mL, Rfl: 3     cinnamon 500 MG CAPS, Take  by mouth., Disp: , Rfl:      glipiZIDE (GLIPIZIDE XL) 2.5 MG 24 hr tablet, Take 1 tablet (2.5 mg) by mouth daily, Disp: 90 tablet, Rfl: 3     glipiZIDE (GLUCOTROL XL) 2.5 MG 24 hr tablet, TAKE 1 TABLET BY MOUTH ONCE DAILY; NEEDS TO BE SEEN FOR FURTHER REFILLS, Disp: 30 tablet, Rfl: 0     glipiZIDE (GLUCOTROL XL) 2.5 MG 24 hr tablet, TAKE ONE TABLET BY MOUTH ONCE DAILY; NEEDS TO BE SEEN FOR FURTHER REFILLS, Disp: 30 tablet, Rfl: 0     hydrochlorothiazide (MICROZIDE) 12.5 MG capsule, TAKE ONE CAPSULE BY MOUTH ONCE DAILY IN THE MORNING, Disp: 90 capsule, Rfl: 3     hydrOXYzine (ATARAX) 10 MG tablet, Take 1 tablet (10 mg) by mouth 3 times daily as needed for anxiety, Disp: 60 tablet, Rfl: 1     lisinopril (PRINIVIL/ZESTRIL) 40 MG tablet, Take 1 tablet (40 mg) by mouth daily, Disp: 90 tablet, Rfl: 3     metFORMIN (GLUCOPHAGE) 1000 MG tablet, Take 1 tablet (1,000 mg) by mouth 2 times daily (with meals), Disp: 180 tablet, Rfl: 3     metFORMIN (GLUCOPHAGE) 1000 MG tablet, Take 1 tablet (1,000 mg) by mouth 2 times daily (with meals), Disp: 180 tablet, Rfl: 3     simvastatin (ZOCOR) 40 MG tablet, TAKE ONE TABLET BY MOUTH AT BEDTIME, Disp: 90 tablet, Rfl: 3     VITAMIN C 500 MG PO TABS, 1 TABLET  DAILY, Disp: , Rfl:      VITAMIN E, daily, Disp: , Rfl:     Allergies -   Allergies   Allergen Reactions     Morphine GI Disturbance       Social History -   Social History     Socioeconomic History     Marital status:      Spouse name: Not on file     Number of children: Not on file     Years of education: Not on file     Highest education level: Not on file   Occupational History     Not on file   Social Needs     Financial resource strain: Not on file     Food insecurity:     Worry: Not on file     Inability: Not on file     Transportation needs:     Medical: Not on file     Non-medical: Not on file   Tobacco Use     Smoking status:  Former Smoker     Types: Cigarettes     Smokeless tobacco: Never Used   Substance and Sexual Activity     Alcohol use: Yes     Comment: little     Drug use: No     Sexual activity: Not Currently   Lifestyle     Physical activity:     Days per week: Not on file     Minutes per session: Not on file     Stress: Not on file   Relationships     Social connections:     Talks on phone: Not on file     Gets together: Not on file     Attends [employer] service: Not on file     Active member of club or organization: Not on file     Attends meetings of clubs or organizations: Not on file     Relationship status: Not on file     Intimate partner violence:     Fear of current or ex partner: Not on file     Emotionally abused: Not on file     Physically abused: Not on file     Forced sexual activity: Not on file   Other Topics Concern     Parent/sibling w/ CABG, MI or angioplasty before 65F 55M? Yes   Social History Narrative     Not on file       Family History -   Family History   Problem Relation Age of Onset     Diabetes Mother      Hypertension Mother      Hypertension Father      Heart Disease Brother         MI x2, CABG, 1st MI at 49-51 yo.      Hypertension Brother      C.A.D. Brother      Alzheimer Disease Brother      Diabetes Sister          maybe     Melanoma No family hx of        Review of Systems - As per HPI and PMHx, otherwise 7 system review of the head and neck negative.    Physical Exam  General - The patient is in no distress.  Alert and oriented to person and place, answers questions and cooperates with examination appropriately.   Voice and Breathing - The patient was breathing comfortably without the use of accessory muscles. There was no wheezing, stridor, or stertor.  The patients voice was clear and strong.  Ears - The auricles are normal. The tympanic membranes are normal in appearance, bony landmarks are intact.  No retraction, perforation, or masses.  No fluid or purulence was seen in the external  canal or the middle ear. No evidence of infection of the middle ear or external canal, cerumen was normal in appearance.  Eyes - Extraocular movements intact.  Sclera were not icteric or injected.    Neck - Palpation of the occipital, submental, submandibular, internal jugular chain, and supraclavicular nodes did not demonstrate any abnormal lymph nodes or masses. Parotid glands had no masses. Palpation of the thyroid was soft and smooth, with no nodules or goiter appreciated.  The trachea was mobile and midline.  Neurological - Cranial nerves 2 through 12 were grossly intact. House-Brackmann grade 1 out of 6 bilaterally.       Audiologic Studies - An audiogram and tympanogram were performed today as part of the evaluation and personally reviewed. The tympanogram shows a normal Type A curve, with normal canal volume and middle ear pressure.  There is no sign of eustachian tube dysfunction or middle ear effusion.  The audiogram showed a significant down-sloping mid to high frequency sensorineural hearing loss.  There was no evidence of conductive hearing loss or significant asymmetry.      Assessment and Plan - Fco Arroyo is a 69 year old male who presents to me today with hearing loss.  This is most consistent with presbycusis and noise exposure. I can find no evidence of serious CNS disorders or other complicating factors that could be causing this.  We spent the remainder of today's visit on education. We discussed hearing protection in noisy environments.    The patient will follow up as necessary for worsening symptoms or changes in symptoms. I have also recommended yearly audiograms, and a hearing aid evaluation.    Emil Padron MD  Otolaryngology  [facility]        Again, thank you for allowing me to participate in the care of your patient.        Sincerely,        mEil Padron MD

## 2019-07-25 NOTE — PROGRESS NOTES
Chief Complaint - Hearing loss    History of Present Illness - Fco Arroyo is a 69 year old male who presents to me today with hearing loss in both ears.  It has been present and noticeable for approximately years.  It was gradual in onset, worsening.  The patient has noticed increased difficulty hearing certain sounds and difficulty in understanding others, especially in noisy or crowded situations.  There is no history ofchronic ear disease or ear surgery.  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 + family history of hearing loss. The patient denies otorrhea, otalgia. Use to wear a HA, right-side, but it broke.     Past Medical History -   Patient Active Problem List   Diagnosis     R PLANTAR FIBROMATOSIS     Sensorineural hearing loss, bilateral     Hyperlipidemia LDL goal <100     Advanced directives, counseling/discussion     Morbid obesity (H)     Benign essential hypertension     Type 2 diabetes mellitus without complication, without long-term current use of insulin (H)       Current Medications -   Current Outpatient Medications:      ASPIRIN 81 MG OR TABS, 1 TABLET DAILY, Disp: , Rfl:      blood glucose (ACCU-CHEK SMARTVIEW) test strip, Use to test blood sugar 1 times daily or as directed., Disp: 50 strip, Rfl: 12     blood glucose monitoring (ACCU-CHEK FASTCLIX) lancets, 1 each daily Use to test blood sugar 1 times daily or as directed., Disp: 102 Box, Rfl: 12     blood glucose monitoring (ACCU-CHEK SMARTVIEW) test strip, Use to test blood sugar 2 times daily or as directed., Disp: 200 each, Rfl: 3     ciclopirox (PENLAC) 8 % external solution, Apply to adjacent skin and affected nails daily.  Remove with alcohol every 7 days, then repeat., Disp: 300 mL, Rfl: 3     cinnamon 500 MG CAPS, Take  by mouth., Disp: , Rfl:      glipiZIDE (GLIPIZIDE XL) 2.5 MG 24 hr tablet, Take 1 tablet (2.5 mg) by mouth daily, Disp: 90 tablet, Rfl: 3     glipiZIDE  (GLUCOTROL XL) 2.5 MG 24 hr tablet, TAKE 1 TABLET BY MOUTH ONCE DAILY; NEEDS TO BE SEEN FOR FURTHER REFILLS, Disp: 30 tablet, Rfl: 0     glipiZIDE (GLUCOTROL XL) 2.5 MG 24 hr tablet, TAKE ONE TABLET BY MOUTH ONCE DAILY; NEEDS TO BE SEEN FOR FURTHER REFILLS, Disp: 30 tablet, Rfl: 0     hydrochlorothiazide (MICROZIDE) 12.5 MG capsule, TAKE ONE CAPSULE BY MOUTH ONCE DAILY IN THE MORNING, Disp: 90 capsule, Rfl: 3     hydrOXYzine (ATARAX) 10 MG tablet, Take 1 tablet (10 mg) by mouth 3 times daily as needed for anxiety, Disp: 60 tablet, Rfl: 1     lisinopril (PRINIVIL/ZESTRIL) 40 MG tablet, Take 1 tablet (40 mg) by mouth daily, Disp: 90 tablet, Rfl: 3     metFORMIN (GLUCOPHAGE) 1000 MG tablet, Take 1 tablet (1,000 mg) by mouth 2 times daily (with meals), Disp: 180 tablet, Rfl: 3     metFORMIN (GLUCOPHAGE) 1000 MG tablet, Take 1 tablet (1,000 mg) by mouth 2 times daily (with meals), Disp: 180 tablet, Rfl: 3     simvastatin (ZOCOR) 40 MG tablet, TAKE ONE TABLET BY MOUTH AT BEDTIME, Disp: 90 tablet, Rfl: 3     VITAMIN C 500 MG PO TABS, 1 TABLET  DAILY, Disp: , Rfl:      VITAMIN E, daily, Disp: , Rfl:     Allergies -   Allergies   Allergen Reactions     Morphine GI Disturbance       Social History -   Social History     Socioeconomic History     Marital status:      Spouse name: Not on file     Number of children: Not on file     Years of education: Not on file     Highest education level: Not on file   Occupational History     Not on file   Social Needs     Financial resource strain: Not on file     Food insecurity:     Worry: Not on file     Inability: Not on file     Transportation needs:     Medical: Not on file     Non-medical: Not on file   Tobacco Use     Smoking status: Former Smoker     Types: Cigarettes     Smokeless tobacco: Never Used   Substance and Sexual Activity     Alcohol use: Yes     Comment: little     Drug use: No     Sexual activity: Not Currently   Lifestyle     Physical activity:     Days per  week: Not on file     Minutes per session: Not on file     Stress: Not on file   Relationships     Social connections:     Talks on phone: Not on file     Gets together: Not on file     Attends [employer] service: Not on file     Active member of club or organization: Not on file     Attends meetings of clubs or organizations: Not on file     Relationship status: Not on file     Intimate partner violence:     Fear of current or ex partner: Not on file     Emotionally abused: Not on file     Physically abused: Not on file     Forced sexual activity: Not on file   Other Topics Concern     Parent/sibling w/ CABG, MI or angioplasty before 65F 55M? Yes   Social History Narrative     Not on file       Family History -   Family History   Problem Relation Age of Onset     Diabetes Mother      Hypertension Mother      Hypertension Father      Heart Disease Brother         MI x2, CABG, 1st MI at 49-51 yo.      Hypertension Brother      C.A.D. Brother      Alzheimer Disease Brother      Diabetes Sister          maybe     Melanoma No family hx of        Review of Systems - As per HPI and PMHx, otherwise 7 system review of the head and neck negative.    Physical Exam  General - The patient is in no distress.  Alert and oriented to person and place, answers questions and cooperates with examination appropriately.   Voice and Breathing - The patient was breathing comfortably without the use of accessory muscles. There was no wheezing, stridor, or stertor.  The patients voice was clear and strong.  Ears - The auricles are normal. The tympanic membranes are normal in appearance, bony landmarks are intact.  No retraction, perforation, or masses.  No fluid or purulence was seen in the external canal or the middle ear. No evidence of infection of the middle ear or external canal, cerumen was normal in appearance.  Eyes - Extraocular movements intact.  Sclera were not icteric or injected.    Neck - Palpation of the occipital, submental,  submandibular, internal jugular chain, and supraclavicular nodes did not demonstrate any abnormal lymph nodes or masses. Parotid glands had no masses. Palpation of the thyroid was soft and smooth, with no nodules or goiter appreciated.  The trachea was mobile and midline.  Neurological - Cranial nerves 2 through 12 were grossly intact. House-Brackmann grade 1 out of 6 bilaterally.       Audiologic Studies - An audiogram and tympanogram were performed today as part of the evaluation and personally reviewed. The tympanogram shows a normal Type A curve, with normal canal volume and middle ear pressure.  There is no sign of eustachian tube dysfunction or middle ear effusion.  The audiogram showed a significant down-sloping mid to high frequency sensorineural hearing loss.  There was no evidence of conductive hearing loss or significant asymmetry.      Assessment and Plan - Fco Arroyo is a 69 year old male who presents to me today with hearing loss.  This is most consistent with presbycusis and noise exposure. I can find no evidence of serious CNS disorders or other complicating factors that could be causing this.  We spent the remainder of today's visit on education. We discussed hearing protection in noisy environments.    The patient will follow up as necessary for worsening symptoms or changes in symptoms. I have also recommended yearly audiograms, and a hearing aid evaluation.    Emil Padron MD  Otolaryngology  [facility]

## 2019-07-29 ENCOUNTER — OFFICE VISIT (OUTPATIENT)
Dept: AUDIOLOGY | Facility: CLINIC | Age: 69
End: 2019-07-29
Payer: COMMERCIAL

## 2019-07-29 DIAGNOSIS — H90.3 SENSORINEURAL HEARING LOSS, BILATERAL: Primary | ICD-10-CM

## 2019-07-29 PROCEDURE — 99207 ZZC NO CHARGE LOS: CPT | Performed by: AUDIOLOGIST

## 2019-07-29 NOTE — PROGRESS NOTES
AUDIOLOGY REPORT    SUBJECTIVE: Fco Arroyo is a 69 year old male was seen in the Audiology Clinic at  [facility] on 7/29/19 to discuss concerns with hearing and functional communication difficulties. The patient was accompanied by their wife. Fco has been seen previously on 7/25/2019, and results revealed a mild sloping to severe sensorineural hearing loss bilaterally.  The patient was medically evaluated and determined to be cleared for binaural hearing aids by Dr. Padron. Fco notes difficulty with communication in a variety of listening situations and is a previous right ear only hearing aid user.     OBJECTIVE:  Discussed possible hearing aid coverage though [employer]. Patient elects to check for possible hearing aid coverage before proceeding with the hearing aid consultation.     ASSESSMENT:   No diagnosis found.      PLAN: Fco will return to clinic for hearing aid consultation as desires.       Caity [id number]

## 2020-02-17 ENCOUNTER — DOCUMENTATION ONLY (OUTPATIENT)
Dept: FAMILY MEDICINE | Facility: CLINIC | Age: 70
End: 2020-02-17

## 2020-02-17 DIAGNOSIS — E11.9 TYPE 2 DIABETES MELLITUS WITHOUT COMPLICATION, WITHOUT LONG-TERM CURRENT USE OF INSULIN (H): ICD-10-CM

## 2020-02-17 DIAGNOSIS — I10 BENIGN ESSENTIAL HYPERTENSION: Primary | ICD-10-CM

## 2020-02-17 DIAGNOSIS — E78.5 HYPERLIPIDEMIA LDL GOAL <100: ICD-10-CM

## 2020-02-17 NOTE — PROGRESS NOTES
Patient has appointment tomorrow. Due for TSH and A1C per Health Maintenance. Unsure if needing to recheck Lipid.    Per last lab orders: Notes recorded by Virginie Lan MD on 6/25/2019 at 10:58 AM CDT  Please inform patient that his cholesterol labs were much worse than the last check . He is to continue with the Zocor and also to work on diet and exercise.   Other labs were within normal limits.   Thanks  Dr Edyta SHELBY BSN, RN

## 2020-02-18 DIAGNOSIS — E11.9 TYPE 2 DIABETES MELLITUS WITHOUT COMPLICATION, WITHOUT LONG-TERM CURRENT USE OF INSULIN (H): ICD-10-CM

## 2020-02-18 DIAGNOSIS — I10 BENIGN ESSENTIAL HYPERTENSION: ICD-10-CM

## 2020-02-18 DIAGNOSIS — E78.5 HYPERLIPIDEMIA LDL GOAL <100: ICD-10-CM

## 2020-02-18 LAB
CHOLEST SERPL-MCNC: 182 MG/DL
HBA1C MFR BLD: 6.8 % (ref 0–5.6)
HDLC SERPL-MCNC: 42 MG/DL
LDLC SERPL CALC-MCNC: 105 MG/DL
NONHDLC SERPL-MCNC: 140 MG/DL
TRIGL SERPL-MCNC: 175 MG/DL
TSH SERPL DL<=0.005 MIU/L-ACNC: 1.24 MU/L (ref 0.4–4)

## 2020-02-18 PROCEDURE — 84443 ASSAY THYROID STIM HORMONE: CPT | Performed by: FAMILY MEDICINE

## 2020-02-18 PROCEDURE — 80061 LIPID PANEL: CPT | Performed by: FAMILY MEDICINE

## 2020-02-18 PROCEDURE — 36415 COLL VENOUS BLD VENIPUNCTURE: CPT | Performed by: FAMILY MEDICINE

## 2020-02-18 PROCEDURE — 83036 HEMOGLOBIN GLYCOSYLATED A1C: CPT | Performed by: FAMILY MEDICINE

## 2020-02-18 NOTE — LETTER
February 18, 2020      Fco Arroyo  [address]  [facility] [address]        Dear ,    We are writing to inform you of your test results.    Test result showed high cholesterol but some mild improvement from last year.   Thyroid was okay.  A1C also improved to 6.8 from 7.0.     Resulted Orders   Lipid panel reflex to direct LDL Fasting   Result Value Ref Range    Cholesterol 182 <200 mg/dL    Triglycerides 175 (H) <150 mg/dL      Comment:      Borderline high:  150-199 mg/dl  High:             200-499 mg/dl  Very high:       >499 mg/dl  Fasting specimen      HDL Cholesterol 42 >39 mg/dL    LDL Cholesterol Calculated 105 (H) <100 mg/dL      Comment:      Above desirable:  100-129 mg/dl  Borderline High:  130-159 mg/dL  High:             160-189 mg/dL  Very high:       >189 mg/dl      Non HDL Cholesterol 140 (H) <130 mg/dL      Comment:      Above Desirable:  130-159 mg/dl  Borderline high:  160-189 mg/dl  High:             190-219 mg/dl  Very high:       >219 mg/dl     **A1C FUTURE anytime   Result Value Ref Range    Hemoglobin A1C 6.8 (H) 0 - 5.6 %      Comment:      Normal <5.7% Prediabetes 5.7-6.4%  Diabetes 6.5% or higher - adopted from ADA   consensus guidelines.     **TSH with free T4 reflex FUTURE anytime   Result Value Ref Range    TSH 1.24 0.40 - 4.00 mU/L       If you have any questions or concerns, please call the clinic at the number listed above.       Sincerely,        Dr. Lan/emili

## 2020-02-18 NOTE — RESULT ENCOUNTER NOTE
Please inform patient that test result showed high cholesterol but some mild improvement from last year.   Thyroid was okay. A1C also improved to 6.8 from 7.0  Thank you.     Virginie Lan M.D.

## 2020-02-25 ENCOUNTER — ANCILLARY PROCEDURE (OUTPATIENT)
Dept: GENERAL RADIOLOGY | Facility: CLINIC | Age: 70
End: 2020-02-25
Attending: FAMILY MEDICINE
Payer: COMMERCIAL

## 2020-02-25 ENCOUNTER — OFFICE VISIT (OUTPATIENT)
Dept: FAMILY MEDICINE | Facility: CLINIC | Age: 70
End: 2020-02-25
Payer: COMMERCIAL

## 2020-02-25 VITALS
BODY MASS INDEX: 39.39 KG/M2 | RESPIRATION RATE: 18 BRPM | WEIGHT: 251 LBS | HEIGHT: 67 IN | OXYGEN SATURATION: 96 % | HEART RATE: 84 BPM | TEMPERATURE: 98.3 F | DIASTOLIC BLOOD PRESSURE: 66 MMHG | SYSTOLIC BLOOD PRESSURE: 128 MMHG

## 2020-02-25 DIAGNOSIS — I10 BENIGN ESSENTIAL HYPERTENSION: ICD-10-CM

## 2020-02-25 DIAGNOSIS — M25.571 CHRONIC PAIN OF RIGHT ANKLE: ICD-10-CM

## 2020-02-25 DIAGNOSIS — G89.29 CHRONIC PAIN OF RIGHT ANKLE: ICD-10-CM

## 2020-02-25 DIAGNOSIS — M25.571 CHRONIC PAIN OF RIGHT ANKLE: Primary | ICD-10-CM

## 2020-02-25 DIAGNOSIS — E66.01 MORBID OBESITY (H): ICD-10-CM

## 2020-02-25 DIAGNOSIS — E11.9 TYPE 2 DIABETES MELLITUS WITHOUT COMPLICATION, WITHOUT LONG-TERM CURRENT USE OF INSULIN (H): ICD-10-CM

## 2020-02-25 DIAGNOSIS — G89.29 CHRONIC PAIN OF RIGHT ANKLE: Primary | ICD-10-CM

## 2020-02-25 DIAGNOSIS — E78.5 HYPERLIPIDEMIA LDL GOAL <100: ICD-10-CM

## 2020-02-25 PROCEDURE — 73630 X-RAY EXAM OF FOOT: CPT | Mod: RT

## 2020-02-25 PROCEDURE — 99214 OFFICE O/P EST MOD 30 MIN: CPT | Performed by: FAMILY MEDICINE

## 2020-02-25 PROCEDURE — 73610 X-RAY EXAM OF ANKLE: CPT | Mod: RT

## 2020-02-25 RX ORDER — SIMVASTATIN 40 MG
TABLET ORAL
Qty: 90 TABLET | Refills: 3 | Status: SHIPPED | OUTPATIENT
Start: 2020-02-25 | End: 2020-06-05 | Stop reason: ALTCHOICE

## 2020-02-25 RX ORDER — HYDROCHLOROTHIAZIDE 12.5 MG/1
CAPSULE ORAL
Qty: 90 CAPSULE | Refills: 3 | Status: SHIPPED | OUTPATIENT
Start: 2020-02-25 | End: 2020-02-25 | Stop reason: SINTOL

## 2020-02-25 RX ORDER — GLIPIZIDE 2.5 MG/1
2.5 TABLET, EXTENDED RELEASE ORAL DAILY
Qty: 90 TABLET | Refills: 3 | Status: SHIPPED | OUTPATIENT
Start: 2020-02-25 | End: 2021-04-14

## 2020-02-25 RX ORDER — LISINOPRIL 40 MG/1
40 TABLET ORAL DAILY
Qty: 90 TABLET | Refills: 3 | Status: SHIPPED | OUTPATIENT
Start: 2020-02-25 | End: 2021-04-14

## 2020-02-25 ASSESSMENT — ANXIETY QUESTIONNAIRES
1. FEELING NERVOUS, ANXIOUS, OR ON EDGE: MORE THAN HALF THE DAYS
3. WORRYING TOO MUCH ABOUT DIFFERENT THINGS: NOT AT ALL
6. BECOMING EASILY ANNOYED OR IRRITABLE: SEVERAL DAYS
5. BEING SO RESTLESS THAT IT IS HARD TO SIT STILL: SEVERAL DAYS
7. FEELING AFRAID AS IF SOMETHING AWFUL MIGHT HAPPEN: NOT AT ALL
GAD7 TOTAL SCORE: 4
IF YOU CHECKED OFF ANY PROBLEMS ON THIS QUESTIONNAIRE, HOW DIFFICULT HAVE THESE PROBLEMS MADE IT FOR YOU TO DO YOUR WORK, TAKE CARE OF THINGS AT HOME, OR GET ALONG WITH OTHER PEOPLE: SOMEWHAT DIFFICULT
2. NOT BEING ABLE TO STOP OR CONTROL WORRYING: NOT AT ALL

## 2020-02-25 ASSESSMENT — PATIENT HEALTH QUESTIONNAIRE - PHQ9
5. POOR APPETITE OR OVEREATING: NOT AT ALL
SUM OF ALL RESPONSES TO PHQ QUESTIONS 1-9: 5

## 2020-02-25 ASSESSMENT — MIFFLIN-ST. JEOR: SCORE: 1852.16

## 2020-02-25 NOTE — PATIENT INSTRUCTIONS
Patient Education     Understanding Anxiety Disorders  Almost everyone gets nervous now and then. It s normal to have knots in your stomach before a test, or for your heart to race on a first date. But an anxiety disorder is much more than a case of nerves. In fact, its symptoms may be overwhelming. But treatment can relieve many of these symptoms. Talking to your healthcare provider is the first step.    What are anxiety disorders?  An anxiety disorder causes intense feelings of panic and fear. These feelings may arise for no apparent reason. And they tend to recur again and again. They may prevent you from coping with life and cause you great distress. As a result, you may avoid anything that triggers your fear. In extreme cases, you may never leave the house. Anxiety disorders may cause other symptoms, such as:    Obsessive thoughts that are unwanted and you can t control    Constant nightmares or painful thoughts of the past    Nausea, sweating, and muscle tension    Trouble sleeping or concentrating  What causes anxiety disorders?  Anxiety disorders tend to run in families. For some people, childhood abuse or neglect may play a role. For others, stressful life events or trauma may trigger anxiety disorders. Anxiety can trigger low self-esteem and poor coping skills.    Panic disorder. This causes an intense fear of being in danger.    Phobias. These are extreme fears of certain objects, places, or events.    Obsessive-compulsive disorder. This causes you to have unwanted thoughts and urges. You also may perform certain actions over and over.    Posttraumatic stress disorder. This occurs in people who have survived a terrible ordeal. It can cause nightmares and flashbacks about the event.    Generalized anxiety disorder. This causes constant worry that can greatly disrupt your life.    Getting better  You may believe that nothing can help you. Or, you might fear what others may think. But most anxiety symptoms  can be eased. Having an anxiety disorder is nothing to be ashamed of. Most people do best with treatment that combines medicine and individual and group therapy. These aren t cures. But they can help you live a healthier life.  Date Last Reviewed: 2/1/2017 2000-2019 [employer]. [address] [place] [address], [place] [address]. All rights reserved. This information is not intended as a substitute for professional medical care. Always follow your healthcare professional's instructions.

## 2020-02-25 NOTE — PROGRESS NOTES
Subjective     Fco Arroyo is a 70 year old male who presents to clinic today for the following health issues:       Patient is a 70-year-old male who has Type 2 diabetes  and had  labs done recently which showed improvement in his A1c went from 7.0 -6.8.  He is tolerating his medications thus far and medications were refilled today.        Other complaints today is that his been having increasing anxiety and panic attacks.  At his last visit we had started him on hydroxyzine which he reports is helping some but he is just having increasing anxiety.  He was accompanied by his wife today and she had mentioned that a while back that when he is hydrochlorothiazide was stopped this seemed to lessen the anxiety.  We will do a trial of stopping hydrochlorothiazide and see how he does.  If he still having severe anxiety he may benefit from a daily antidepressants that also helps with anxiety.      His last requested today is  that he has been having righ ankle pain. The pain is mostly on the medial part of  his ankle.  Recalls no trauma.  There has been no swelling or stiffness.  He reports sharp pain and sometimes a dull pain in the medial part of his left foot.  Denies any recent trauma.    HPI   Chief Complaint   Patient presents with     Diabetes     Anxiety     Patient is having increased anxiety ? the medication he is on has had trouble with the HCTZ in the past      Trauma     would like x-ray today          Diabetes Follow-up    How often are you checking your blood sugar? A few times a month  What time of day are you checking your blood sugars (select all that apply)?  At bedtime  Have you had any blood sugars above 200?  No 120-150  Have you had any blood sugars below 70?  No    What symptoms do you notice when your blood sugar is low?  None    What concerns do you have today about your diabetes? None     Do you have any of these symptoms? (Select all that apply)  Numbness in feet      BP Readings from Last  2 Encounters:   02/25/20 128/66   07/25/19 146/78     Hemoglobin A1C (%)   Date Value   02/18/2020 6.8 (H)   06/13/2019 7.0 (H)     LDL Cholesterol Calculated (mg/dL)   Date Value   02/18/2020 105 (H)   06/13/2019 141 (H)               Hyperlipidemia Follow-Up      Are you regularly taking any medication or supplement to lower your cholesterol?   Yes- simvastatin    Are you having muscle aches or other side effects that you think could be caused by your cholesterol lowering medication?  No    Hypertension Follow-up      Do you check your blood pressure regularly outside of the clinic? Yes     Are you following a low salt diet? Yes    Are your blood pressures ever more than 140 on the top number (systolic) OR more   than 90 on the bottom number (diastolic), for example 140/90? Yes sometimes     Anxiety Follow-Up    How are you doing with your anxiety since your last visit? Worsened ? About taking the hydrochlorothiazide seem to affect him in the past     Are you having other symptoms that might be associated with anxiety? Yes:  always feels enclosed     Have you had a significant life event? No     Are you feeling depressed? No    Do you have any concerns with your use of alcohol or other drugs? No    Social History     Tobacco Use     Smoking status: Former Smoker     Types: Cigarettes     Smokeless tobacco: Never Used   Substance Use Topics     Alcohol use: Yes     Comment: little     Drug use: No     FLOWER-7 SCORE 2/25/2020   Total Score 4     PHQ 2/25/2020   PHQ-9 Total Score 5   Q9: Thoughts of better off dead/self-harm past 2 weeks Not at all     Last PHQ-9 2/25/2020   1.  Little interest or pleasure in doing things 0   2.  Feeling down, depressed, or hopeless 0   3.  Trouble falling or staying asleep, or sleeping too much 3   4.  Feeling tired or having little energy 1   5.  Poor appetite or overeating 0   6.  Feeling bad about yourself 0   7.  Trouble concentrating 0   8.  Moving slowly or restless 1   Q9:  Thoughts of better off dead/self-harm past 2 weeks 0   PHQ-9 Total Score 5   Difficulty at work, home, or with people Very difficult         How many servings of fruits and vegetables do you eat daily?  0-1    On average, how many sweetened beverages do you drink each day (Examples: soda, juice, sweet tea, etc.  Do NOT count diet or artificially sweetened beverages)?   1    How many days per week do you exercise enough to make your heart beat faster? 3 or less    How many minutes a day do you exercise enough to make your heart beat faster? 9 or less  How many days per week do you miss taking your medication? 2 monly    What makes it hard for you to take your medications?  remembering to take    Medication Followup of All his medication     Taking Medication as prescribed: yes    Side Effects:  None    Medication Helping Symptoms:  yes     Joint Pain    Onset: R ankle pain    Description:   Location: right ankle  Character: Sharp    Intensity: moderate    Progression of Symptoms: worse    Accompanying Signs & Symptoms:  Other symptoms: none    History:     Previous similar pain: YES 2 mos ago it was worse than now  Precipitating factors:   Trauma or overuse: no     Alleviating factors:  Improved by: support wrap    Therapies Tried and outcome: Patient has been using a support wrap           Patient Active Problem List   Diagnosis     R PLANTAR FIBROMATOSIS     Sensorineural hearing loss, bilateral     Hyperlipidemia LDL goal <100     Advanced directives, counseling/discussion     Morbid obesity (H)     Benign essential hypertension     Type 2 diabetes mellitus without complication, without long-term current use of insulin (H)     Past Surgical History:   Procedure Laterality Date     SURGICAL HISTORY OF -   1998    open cholecystectomy for gallstones s/p ERCP     SURGICAL HISTORY OF -   1997    cystoscopy     SURGICAL HISTORY OF -   11/1997    Urethral stricture       Social History     Tobacco Use     Smoking  status: Former Smoker     Types: Cigarettes     Smokeless tobacco: Never Used   Substance Use Topics     Alcohol use: Yes     Comment: little     Family History   Problem Relation Age of Onset     Diabetes Mother      Hypertension Mother      Hypertension Father      Heart Disease Brother         MI x2, CABG, 1st MI at 49-51 yo.      Hypertension Brother      C.A.D. Brother      Alzheimer Disease Brother      Diabetes Sister          maybe     Melanoma No family hx of          Current Outpatient Medications   Medication Sig Dispense Refill     ASPIRIN 81 MG OR TABS 1 TABLET DAILY       blood glucose (ACCU-CHEK SMARTVIEW) test strip Use to test blood sugar 1 times daily or as directed. 50 strip 12     blood glucose monitoring (ACCU-CHEK FASTCLIX) lancets 1 each daily Use to test blood sugar 1 times daily or as directed. 102 [address] 12     cinnamon 500 MG CAPS Take  by mouth.       glipiZIDE (GLIPIZIDE XL) 2.5 MG 24 hr tablet Take 1 tablet (2.5 mg) by mouth daily 90 tablet 3     hydrOXYzine (ATARAX) 10 MG tablet Take 1 tablet (10 mg) by mouth 3 times daily as needed for anxiety 60 tablet 1     lisinopril (ZESTRIL) 40 MG tablet Take 1 tablet (40 mg) by mouth daily 90 tablet 3     metFORMIN (GLUCOPHAGE) 1000 MG tablet Take 1 tablet (1,000 mg) by mouth 2 times daily (with meals) 180 tablet 3     simvastatin (ZOCOR) 40 MG tablet TAKE ONE TABLET BY MOUTH AT BEDTIME 90 tablet 3     VITAMIN C 500 MG PO TABS 1 TABLET  DAILY       VITAMIN E daily       blood glucose monitoring (ACCU-CHEK SMARTVIEW) test strip Use to test blood sugar 2 times daily or as directed. 200 each 3     ciclopirox (PENLAC) 8 % external solution Apply to adjacent skin and affected nails daily.  Remove with alcohol every 7 days, then repeat. (Patient not taking: Reported on 2/25/2020) 300 mL 3     glipiZIDE (GLUCOTROL XL) 2.5 MG 24 hr tablet TAKE 1 TABLET BY MOUTH ONCE DAILY; NEEDS TO BE SEEN FOR FURTHER REFILLS 30 tablet 0     glipiZIDE (GLUCOTROL XL) 2.5 MG  "24 hr tablet TAKE ONE TABLET BY MOUTH ONCE DAILY; NEEDS TO BE SEEN FOR FURTHER REFILLS 30 tablet 0     metFORMIN (GLUCOPHAGE) 1000 MG tablet Take 1 tablet (1,000 mg) by mouth 2 times daily (with meals) 180 tablet 3     Allergies   Allergen Reactions     Morphine GI Disturbance     Recent Labs   Lab Test 02/18/20  0752 06/13/19  0838 05/31/18  0750  11/16/16  0810   A1C 6.8* 7.0* 7.0*   < > 6.6*   * 141* 83  --   --    HDL 42 41 36*  --   --    TRIG 175* 170* 170*  --   --    ALT  --  31 39  --   --    CR  --  0.93 0.86   < >  --    GFRESTIMATED  --  83 89   < >  --    GFRESTBLACK  --  >90 >90   < >  --    POTASSIUM  --  3.9 3.8   < >  --    TSH 1.24  --   --   --  0.88    < > = values in this interval not displayed.      BP Readings from Last 3 Encounters:   02/25/20 128/66   07/25/19 146/78   06/13/19 120/76    Wt Readings from Last 3 Encounters:   02/25/20 113.9 kg (251 lb)   07/25/19 112 kg (247 lb)   06/13/19 112 kg (247 lb)                      Reviewed and updated as needed this visit by Provider  Tobacco  Allergies  Meds  Problems  Med Hx  Surg Hx  Fam Hx         Review of Systems   ROS COMP: Constitutional, HEENT, cardiovascular, pulmonary, gi and gu systems are negative, except as otherwise noted.      Objective    /66   Pulse 84   Temp 98.3  F (36.8  C) (Tympanic)   Resp 18   Ht 1.702 m (5' 7\")   Wt 113.9 kg (251 lb)   SpO2 96%   BMI 39.31 kg/m    Body mass index is 39.31 kg/m .  Physical Exam   GENERAL: healthy, alert and no distress  EYES: Eyes grossly normal to inspection, PERRL and conjunctivae and sclerae normal  HENT: ear canals and TM's normal, nose and mouth without ulcers or lesions  NECK: no adenopathy, no asymmetry, masses, or scars and thyroid normal to palpation  RESP: lungs clear to auscultation - no rales, rhonchi or wheezes  CV: regular rate and rhythm, normal S1 S2, no S3 or S4, no murmur, click or rub, no peripheral edema and peripheral pulses strong  ABDOMEN: " soft, nontender, no hepatosplenomegaly, no masses and bowel sounds normal  MS: normal range of motion and tenderness to palpation on medial foot    Diagnostic Test Results:  Xray - Right foot: Mild hallux valgus metatarsus varus. No acute abnormality.            Assessment & Plan     1. Type 2 diabetes mellitus without complication, without long-term current use of insulin (H)  A1C improved.  - metFORMIN (GLUCOPHAGE) 1000 MG tablet; Take 1 tablet (1,000 mg) by mouth 2 times daily (with meals)  Dispense: 180 tablet; Refill: 3  - glipiZIDE (GLIPIZIDE XL) 2.5 MG 24 hr tablet; Take 1 tablet (2.5 mg) by mouth daily  Dispense: 90 tablet; Refill: 3    2. Hyperlipidemia LDL goal <100  Medication refilled.   - simvastatin (ZOCOR) 40 MG tablet; TAKE ONE TABLET BY MOUTH AT BEDTIME  Dispense: 90 tablet; Refill: 3    3. Benign essential hypertension  Blood pressure is within fair range. Medication refilled  - lisinopril (ZESTRIL) 40 MG tablet; Take 1 tablet (40 mg) by mouth daily  Dispense: 90 tablet; Refill: 3    4. Morbid obesity (H)      5. Chronic pain of right ankle  X-rays discussed with patient likely a tendonitis. Referral to podiatrist.  - XR Ankle Right G/E 3 Views; Future  - XR Foot Right G/E 3 Views; Future  - Orthopedic & Spine  Referral; Future             FUTURE APPOINTMENTS:       - Follow-up visit in one month or sooner as needed.  Patient Instructions     Patient Education     Understanding Anxiety Disorders  Almost everyone gets nervous now and then. It s normal to have knots in your stomach before a test, or for your heart to race on a first date. But an anxiety disorder is much more than a case of nerves. In fact, its symptoms may be overwhelming. But treatment can relieve many of these symptoms. Talking to your healthcare provider is the first step.    What are anxiety disorders?  An anxiety disorder causes intense feelings of panic and fear. These feelings may arise for no apparent reason. And  they tend to recur again and again. They may prevent you from coping with life and cause you great distress. As a result, you may avoid anything that triggers your fear. In extreme cases, you may never leave the house. Anxiety disorders may cause other symptoms, such as:    Obsessive thoughts that are unwanted and you can t control    Constant nightmares or painful thoughts of the past    Nausea, sweating, and muscle tension    Trouble sleeping or concentrating  What causes anxiety disorders?  Anxiety disorders tend to run in families. For some people, childhood abuse or neglect may play a role. For others, stressful life events or trauma may trigger anxiety disorders. Anxiety can trigger low self-esteem and poor coping skills.    Panic disorder. This causes an intense fear of being in danger.    Phobias. These are extreme fears of certain objects, places, or events.    Obsessive-compulsive disorder. This causes you to have unwanted thoughts and urges. You also may perform certain actions over and over.    Posttraumatic stress disorder. This occurs in people who have survived a terrible ordeal. It can cause nightmares and flashbacks about the event.    Generalized anxiety disorder. This causes constant worry that can greatly disrupt your life.    Getting better  You may believe that nothing can help you. Or, you might fear what others may think. But most anxiety symptoms can be eased. Having an anxiety disorder is nothing to be ashamed of. Most people do best with treatment that combines medicine and individual and group therapy. These aren t cures. But they can help you live a healthier life.  Date Last Reviewed: 2/1/2017 2000-2019 [employer]. [address] [place] [address], [place] [address]. All rights reserved. This information is not intended as a substitute for professional medical care. Always follow your healthcare professional's instructions.               Return in about 4 weeks (around  3/24/2020) for Routine Visit.    Virginie Lan MD  [facility]

## 2020-02-26 ASSESSMENT — ANXIETY QUESTIONNAIRES: GAD7 TOTAL SCORE: 4

## 2020-02-27 ENCOUNTER — OFFICE VISIT (OUTPATIENT)
Dept: PODIATRY | Facility: CLINIC | Age: 70
End: 2020-02-27
Payer: COMMERCIAL

## 2020-02-27 VITALS
DIASTOLIC BLOOD PRESSURE: 73 MMHG | HEART RATE: 83 BPM | WEIGHT: 251 LBS | HEIGHT: 67 IN | BODY MASS INDEX: 39.39 KG/M2 | SYSTOLIC BLOOD PRESSURE: 126 MMHG

## 2020-02-27 DIAGNOSIS — M76.821 POSTERIOR TIBIAL TENDONITIS, RIGHT: Primary | ICD-10-CM

## 2020-02-27 DIAGNOSIS — M25.571 CHRONIC PAIN OF RIGHT ANKLE: ICD-10-CM

## 2020-02-27 DIAGNOSIS — G89.29 CHRONIC PAIN OF RIGHT ANKLE: ICD-10-CM

## 2020-02-27 PROCEDURE — 99203 OFFICE O/P NEW LOW 30 MIN: CPT | Performed by: PODIATRIST

## 2020-02-27 ASSESSMENT — MIFFLIN-ST. JEOR: SCORE: 1852.16

## 2020-02-27 NOTE — PROGRESS NOTES
PATIENT HISTORY:  Fco Arroyo is a 70 year old male who presents to clinic for a painful right foot .  The patient describes the pain as sharp stabbing.  The patient relates the pain level is moderate.  The patient relates pain is located along the inside of the right ankle.  The patient relates the pain has been present for the past several weeks.  The patient relates pain with ambulation.  The patient has tried different shoes with little relief.  The patient was sent by  for consultation on the right foot.       REVIEW OF SYSTEMS:  Constitutional, HEENT, cardiovascular, pulmonary, GI, , musculoskeletal, neuro, skin, endocrine and psych systems are negative, except as otherwise noted.     PAST MEDICAL HISTORY:   Past Medical History:   Diagnosis Date     DIABETES MELLITUS TYPE II-UNCOMPL 8/17/2005     Essential hypertension, benign     on meds, Stress Echo 1991 - normal, but suboptimal exertion     Other and unspecified hyperlipidemia      Other isolated or specific phobias     atenolol helps, wants open MRI     Other specified causes of urethral stricture     cystoscopy 1997 some hematuria in past - benign     Squamous cell carcinoma         PAST SURGICAL HISTORY:   Past Surgical History:   Procedure Laterality Date     SURGICAL HISTORY OF -   1998    open cholecystectomy for gallstones s/p ERCP     SURGICAL HISTORY OF -   1997    cystoscopy     SURGICAL HISTORY OF -   11/1997    Urethral stricture        MEDICATIONS:   Current Outpatient Medications:      ASPIRIN 81 MG OR TABS, 1 TABLET DAILY, Disp: , Rfl:      blood glucose (ACCU-CHEK SMARTVIEW) test strip, Use to test blood sugar 1 times daily or as directed., Disp: 50 strip, Rfl: 12     blood glucose monitoring (ACCU-CHEK FASTCLIX) lancets, 1 each daily Use to test blood sugar 1 times daily or as directed., Disp: 102 Box, Rfl: 12     blood glucose monitoring (ACCU-CHEK SMARTVIEW) test strip, Use to test blood sugar 2 times daily or as  directed., Disp: 200 each, Rfl: 3     cinnamon 500 MG CAPS, Take  by mouth., Disp: , Rfl:      glipiZIDE (GLIPIZIDE XL) 2.5 MG 24 hr tablet, Take 1 tablet (2.5 mg) by mouth daily, Disp: 90 tablet, Rfl: 3     glipiZIDE (GLUCOTROL XL) 2.5 MG 24 hr tablet, TAKE 1 TABLET BY MOUTH ONCE DAILY; NEEDS TO BE SEEN FOR FURTHER REFILLS, Disp: 30 tablet, Rfl: 0     glipiZIDE (GLUCOTROL XL) 2.5 MG 24 hr tablet, TAKE ONE TABLET BY MOUTH ONCE DAILY; NEEDS TO BE SEEN FOR FURTHER REFILLS, Disp: 30 tablet, Rfl: 0     hydrOXYzine (ATARAX) 10 MG tablet, Take 1 tablet (10 mg) by mouth 3 times daily as needed for anxiety, Disp: 60 tablet, Rfl: 1     lisinopril (ZESTRIL) 40 MG tablet, Take 1 tablet (40 mg) by mouth daily, Disp: 90 tablet, Rfl: 3     metFORMIN (GLUCOPHAGE) 1000 MG tablet, Take 1 tablet (1,000 mg) by mouth 2 times daily (with meals), Disp: 180 tablet, Rfl: 3     metFORMIN (GLUCOPHAGE) 1000 MG tablet, Take 1 tablet (1,000 mg) by mouth 2 times daily (with meals), Disp: 180 tablet, Rfl: 3     simvastatin (ZOCOR) 40 MG tablet, TAKE ONE TABLET BY MOUTH AT BEDTIME, Disp: 90 tablet, Rfl: 3     VITAMIN C 500 MG PO TABS, 1 TABLET  DAILY, Disp: , Rfl:      VITAMIN E, daily, Disp: , Rfl:      ciclopirox (PENLAC) 8 % external solution, Apply to adjacent skin and affected nails daily.  Remove with alcohol every 7 days, then repeat. (Patient not taking: Reported on 2/25/2020), Disp: 300 mL, Rfl: 3     ALLERGIES:    Allergies   Allergen Reactions     Morphine GI Disturbance        SOCIAL HISTORY:   Social History     Socioeconomic History     Marital status:      Spouse name: Not on file     Number of children: Not on file     Years of education: Not on file     Highest education level: Not on file   Occupational History     Not on file   Social Needs     Financial resource strain: Not on file     Food insecurity:     Worry: Not on file     Inability: Not on file     Transportation needs:     Medical: Not on file     Non-medical:  "Not on file   Tobacco Use     Smoking status: Former Smoker     Types: Cigarettes     Smokeless tobacco: Never Used   Substance and Sexual Activity     Alcohol use: Yes     Comment: little     Drug use: No     Sexual activity: Not Currently   Lifestyle     Physical activity:     Days per week: Not on file     Minutes per session: Not on file     Stress: Not on file   Relationships     Social connections:     Talks on phone: Not on file     Gets together: Not on file     Attends Gnosticist service: Not on file     Active member of club or organization: Not on file     Attends meetings of clubs or organizations: Not on file     Relationship status: Not on file     Intimate partner violence:     Fear of current or ex partner: Not on file     Emotionally abused: Not on file     Physically abused: Not on file     Forced sexual activity: Not on file   Other Topics Concern     Parent/sibling w/ CABG, MI or angioplasty before 65F 55M? Yes   Social History Narrative     Not on file        FAMILY HISTORY:   Family History   Problem Relation Age of Onset     Diabetes Mother      Hypertension Mother      Hypertension Father      Heart Disease Brother         MI x2, CABG, 1st MI at 49-51 yo.      Hypertension Brother      C.A.D. Brother      Alzheimer Disease Brother      Diabetes Sister          maybe     Melanoma No family hx of         EXAM:Vitals: BP (!) 150/81   Pulse 83   Ht 1.702 m (5' 7\")   Wt 113.9 kg (251 lb)   BMI 39.31 kg/m    BMI= Body mass index is 39.31 kg/m .    Weight management plan: Patient was referred to their PCP to discuss a diet and exercise plan.    General appearance: Patient is alert and fully cooperative with history & exam.  No sign of distress is noted during the visit.     Psychiatric: Affect is pleasant & appropriate.  Patient appears motivated to improve health.     Respiratory: Breathing is regular & unlabored while sitting.     HEENT: Hearing is intact to spoken word.  Speech is clear.  No " gross evidence of visual impairment that would impact ambulation.     Dermatologic: Skin is intact to right lower extremities without significant lesions, rash or abrasion.  No paronychia or evidence of soft tissue infection is noted.     Vascular: DP & PT pulses are intact & regular on the right.  No significant edema or varicosities noted.  CFT and skin temperature is normal to the right lower extremities.     Neurologic: Lower extremity sensation is intact to light touch.  No evidence of weakness or contracture in the right lower extremities.  No evidence of neuropathy.     Musculoskeletal: Patient is ambulatory without assistive device or brace.  No gross ankle deformity noted.  No foot or ankle joint effusion is noted.  Noted pain on palpation of the posterior tibial tendon on the right foot and ankle.  One notes pain with inversion against resistance on the right.  No surrounding erythema noted.    Radiographs from February 25, 2020 were evaluated including AP, lateral and medial oblique views of the right foot and ankle reveals no cortical erosions or periosteal elevation.  All joint margins appear stable.  There is no apparent fracture or tumor formation noted.  There is no evidence of foreign body.    ASSESSMENT / PLAN:     ICD-10-CM    1. Posterior tibial tendonitis, right M76.821    2. Chronic pain of right ankle M25.571 Orthopedic & Spine  Referral    G89.29        I have explained to Fco  about the conditions.  We discussed the nature of the condition as well as the treatment plan and expected length of recovery.  At this time, the patient was instructed on icing, stretching, tissue massage and support.  The patient was fitted with a Tri-Lock ankle brace that will aid in offloading the tension forces to the soft tissues and prevent further inflammation.    The patient will return in four weeks for reevaluation if the symptoms do not resolve.      Fco verbalized agreement with and  understanding of the rational for the diagnosis and treatment plan.  All questions were answered to best of my ability and the patient's satisfaction. The patient was advised to contact the clinic with any questions that may arise after the clinic visit.      Disclaimer: This note consists of symbols derived from keyboarding, dictation and/or voice recognition software. As a result, there may be errors in the script that have gone undetected. Please consider this when interpreting information found in this chart.       PATRICIA Kolb D.P.M., F.A.C.F.A.S.

## 2020-02-27 NOTE — PATIENT INSTRUCTIONS
Initial musculoskeletal treatment recommendation:    1.  Wear supportive foot wear (stiff soles) and/or arch supports (rigid not cushion).  2.  Stretch the calf muscles as instructed once an hour.  3.  Massage the soft tissues around the injured area in the morning to loosen the tissue with an over the counter product like Icy Hot with Lidocaine  4.  Ice the injured area in the evening; 20 min on/off.  5. Take antiinflammatory medication as indicated.    If no improvement in symptoms within four to six weeks, return to clinic for reevaluation.

## 2020-02-27 NOTE — NURSING NOTE
"Chief Complaint   Patient presents with     Consult     Right ankle, XR taken 02/25/20, \"painful to stand on, keep wrapped to help\" X3-4 months       Initial BP (!) 150/81   Pulse 83   Ht 1.702 m (5' 7\")   Wt 113.9 kg (251 lb)   BMI 39.31 kg/m   Estimated body mass index is 39.31 kg/m  as calculated from the following:    Height as of this encounter: 1.702 m (5' 7\").    Weight as of this encounter: 113.9 kg (251 lb).  Medications and allergies reviewed.      Karla BHATTI MA    "

## 2020-02-27 NOTE — LETTER
2/27/2020         RE: Fco Arroyo  [address]  [facility] [address]        Dear Colleague,    Thank you for referring your patient, Fco Arroyo, to the [facility]. Please see a copy of my visit note below.    PATIENT HISTORY:  Fco Arroyo is a 70 year old male who presents to clinic for a painful right foot .  The patient describes the pain as sharp stabbing.  The patient relates the pain level is moderate.  The patient relates pain is located along the inside of the right ankle.  The patient relates the pain has been present for the past several weeks.  The patient relates pain with ambulation.  The patient has tried different shoes with little relief.  The patient was sent by  for consultation on the right foot.       REVIEW OF SYSTEMS:  Constitutional, HEENT, cardiovascular, pulmonary, GI, , musculoskeletal, neuro, skin, endocrine and psych systems are negative, except as otherwise noted.     PAST MEDICAL HISTORY:   Past Medical History:   Diagnosis Date     DIABETES MELLITUS TYPE II-UNCOMPL 8/17/2005     Essential hypertension, benign     on meds, Stress Echo 1991 - normal, but suboptimal exertion     Other and unspecified hyperlipidemia      Other isolated or specific phobias     atenolol helps, wants open MRI     Other specified causes of urethral stricture     cystoscopy 1997 some hematuria in past - benign     Squamous cell carcinoma         PAST SURGICAL HISTORY:   Past Surgical History:   Procedure Laterality Date     SURGICAL HISTORY OF -   1998    open cholecystectomy for gallstones s/p ERCP     SURGICAL HISTORY OF -   1997    cystoscopy     SURGICAL HISTORY OF -   11/1997    Urethral stricture        MEDICATIONS:   Current Outpatient Medications:      ASPIRIN 81 MG OR TABS, 1 TABLET DAILY, Disp: , Rfl:      blood glucose (ACCU-CHEK SMARTVIEW) test strip, Use to test blood sugar 1 times daily or as directed., Disp: 50 strip, Rfl:  12     blood glucose monitoring (ACCU-CHEK FASTCLIX) lancets, 1 each daily Use to test blood sugar 1 times daily or as directed., Disp: 102 Box, Rfl: 12     blood glucose monitoring (ACCU-CHEK SMARTVIEW) test strip, Use to test blood sugar 2 times daily or as directed., Disp: 200 each, Rfl: 3     cinnamon 500 MG CAPS, Take  by mouth., Disp: , Rfl:      glipiZIDE (GLIPIZIDE XL) 2.5 MG 24 hr tablet, Take 1 tablet (2.5 mg) by mouth daily, Disp: 90 tablet, Rfl: 3     glipiZIDE (GLUCOTROL XL) 2.5 MG 24 hr tablet, TAKE 1 TABLET BY MOUTH ONCE DAILY; NEEDS TO BE SEEN FOR FURTHER REFILLS, Disp: 30 tablet, Rfl: 0     glipiZIDE (GLUCOTROL XL) 2.5 MG 24 hr tablet, TAKE ONE TABLET BY MOUTH ONCE DAILY; NEEDS TO BE SEEN FOR FURTHER REFILLS, Disp: 30 tablet, Rfl: 0     hydrOXYzine (ATARAX) 10 MG tablet, Take 1 tablet (10 mg) by mouth 3 times daily as needed for anxiety, Disp: 60 tablet, Rfl: 1     lisinopril (ZESTRIL) 40 MG tablet, Take 1 tablet (40 mg) by mouth daily, Disp: 90 tablet, Rfl: 3     metFORMIN (GLUCOPHAGE) 1000 MG tablet, Take 1 tablet (1,000 mg) by mouth 2 times daily (with meals), Disp: 180 tablet, Rfl: 3     metFORMIN (GLUCOPHAGE) 1000 MG tablet, Take 1 tablet (1,000 mg) by mouth 2 times daily (with meals), Disp: 180 tablet, Rfl: 3     simvastatin (ZOCOR) 40 MG tablet, TAKE ONE TABLET BY MOUTH AT BEDTIME, Disp: 90 tablet, Rfl: 3     VITAMIN C 500 MG PO TABS, 1 TABLET  DAILY, Disp: , Rfl:      VITAMIN E, daily, Disp: , Rfl:      ciclopirox (PENLAC) 8 % external solution, Apply to adjacent skin and affected nails daily.  Remove with alcohol every 7 days, then repeat. (Patient not taking: Reported on 2/25/2020), Disp: 300 mL, Rfl: 3     ALLERGIES:    Allergies   Allergen Reactions     Morphine GI Disturbance        SOCIAL HISTORY:   Social History     Socioeconomic History     Marital status:      Spouse name: Not on file     Number of children: Not on file     Years of education: Not on file     Highest  "education level: Not on file   Occupational History     Not on file   Social Needs     Financial resource strain: Not on file     Food insecurity:     Worry: Not on file     Inability: Not on file     Transportation needs:     Medical: Not on file     Non-medical: Not on file   Tobacco Use     Smoking status: Former Smoker     Types: Cigarettes     Smokeless tobacco: Never Used   Substance and Sexual Activity     Alcohol use: Yes     Comment: little     Drug use: No     Sexual activity: Not Currently   Lifestyle     Physical activity:     Days per week: Not on file     Minutes per session: Not on file     Stress: Not on file   Relationships     Social connections:     Talks on phone: Not on file     Gets together: Not on file     Attends Manda[employer] service: Not on file     Active member of club or organization: Not on file     Attends meetings of clubs or organizations: Not on file     Relationship status: Not on file     Intimate partner violence:     Fear of current or ex partner: Not on file     Emotionally abused: Not on file     Physically abused: Not on file     Forced sexual activity: Not on file   Other Topics Concern     Parent/sibling w/ CABG, MI or angioplasty before 65F 55M? Yes   Social History Narrative     Not on file        FAMILY HISTORY:   Family History   Problem Relation Age of Onset     Diabetes Mother      Hypertension Mother      Hypertension Father      Heart Disease Brother         MI x2, CABG, 1st MI at 49-49 yo.      Hypertension Brother      C.A.D. Brother      Alzheimer Disease Brother      Diabetes Sister          maybe     Melanoma No family hx of         EXAM:Vitals: BP (!) 150/81   Pulse 83   Ht 1.702 m (5' 7\")   Wt 113.9 kg (251 lb)   BMI 39.31 kg/m     BMI= Body mass index is 39.31 kg/m .    Weight management plan: Patient was referred to their PCP to discuss a diet and exercise plan.    General appearance: Patient is alert and fully cooperative with history & exam.  No sign of " distress is noted during the visit.     Psychiatric: Affect is pleasant & appropriate.  Patient appears motivated to improve health.     Respiratory: Breathing is regular & unlabored while sitting.     HEENT: Hearing is intact to spoken word.  Speech is clear.  No gross evidence of visual impairment that would impact ambulation.     Dermatologic: Skin is intact to right lower extremities without significant lesions, rash or abrasion.  No paronychia or evidence of soft tissue infection is noted.     Vascular: DP & PT pulses are intact & regular on the right.  No significant edema or varicosities noted.  CFT and skin temperature is normal to the right lower extremities.     Neurologic: Lower extremity sensation is intact to light touch.  No evidence of weakness or contracture in the right lower extremities.  No evidence of neuropathy.     Musculoskeletal: Patient is ambulatory without assistive device or brace.  No gross ankle deformity noted.  No foot or ankle joint effusion is noted.  Noted pain on palpation of the posterior tibial tendon on the right foot and ankle.  One notes pain with inversion against resistance on the right.  No surrounding erythema noted.    Radiographs from February 25, 2020 were evaluated including AP, lateral and medial oblique views of the right foot and ankle reveals no cortical erosions or periosteal elevation.  All joint margins appear stable.  There is no apparent fracture or tumor formation noted.  There is no evidence of foreign body.    ASSESSMENT / PLAN:     ICD-10-CM    1. Posterior tibial tendonitis, right M76.821    2. Chronic pain of right ankle M25.571 Orthopedic [facility] Spine  Referral    G89.29        I have explained to Fco  about the conditions.  We discussed the nature of the condition as well as the treatment plan and expected length of recovery.  At this time, the patient was instructed on icing, stretching, tissue massage and support.  The patient was fitted with  a Tri-Lock ankle brace that will aid in offloading the tension forces to the soft tissues and prevent further inflammation.    The patient will return in four weeks for reevaluation if the symptoms do not resolve.      Fco verbalized agreement with and understanding of the rational for the diagnosis and treatment plan.  All questions were answered to best of my ability and the patient's satisfaction. The patient was advised to contact the clinic with any questions that may arise after the clinic visit.      Disclaimer: This note consists of symbols derived from keyboarding, dictation and/or voice recognition software. As a result, there may be errors in the script that have gone undetected. Please consider this when interpreting information found in this chart.       DAVY [email or web address]., F.A.C.F.A.S.        Again, thank you for allowing me to participate in the care of your patient.        Sincerely,        Jair Kolb DPM

## 2020-05-27 ENCOUNTER — TELEPHONE (OUTPATIENT)
Dept: BEHAVIORAL HEALTH | Facility: CLINIC | Age: 70
End: 2020-05-27

## 2020-05-27 ENCOUNTER — VIRTUAL VISIT (OUTPATIENT)
Dept: FAMILY MEDICINE | Facility: CLINIC | Age: 70
End: 2020-05-27
Payer: COMMERCIAL

## 2020-05-27 DIAGNOSIS — F41.1 GENERALIZED ANXIETY DISORDER: Primary | ICD-10-CM

## 2020-05-27 DIAGNOSIS — J30.0 CHRONIC VASOMOTOR RHINITIS: ICD-10-CM

## 2020-05-27 PROCEDURE — 99214 OFFICE O/P EST MOD 30 MIN: CPT | Mod: 95 | Performed by: FAMILY MEDICINE

## 2020-05-27 PROCEDURE — 96127 BRIEF EMOTIONAL/BEHAV ASSMT: CPT | Mod: 59 | Performed by: FAMILY MEDICINE

## 2020-05-27 RX ORDER — SERTRALINE HYDROCHLORIDE 25 MG/1
25 TABLET, FILM COATED ORAL DAILY
Qty: 30 TABLET | Refills: 0 | Status: SHIPPED | OUTPATIENT
Start: 2020-05-27 | End: 2020-06-24

## 2020-05-27 RX ORDER — AZELASTINE 1 MG/ML
1 SPRAY, METERED NASAL 2 TIMES DAILY
Qty: 30 ML | Refills: 11 | Status: SHIPPED | OUTPATIENT
Start: 2020-05-27 | End: 2020-10-21

## 2020-05-27 RX ORDER — BUSPIRONE HYDROCHLORIDE 5 MG/1
5 TABLET ORAL EVERY 8 HOURS PRN
Qty: 30 TABLET | Refills: 0 | Status: SHIPPED | OUTPATIENT
Start: 2020-05-27 | End: 2020-07-14

## 2020-05-27 ASSESSMENT — ANXIETY QUESTIONNAIRES
7. FEELING AFRAID AS IF SOMETHING AWFUL MIGHT HAPPEN: NOT AT ALL
5. BEING SO RESTLESS THAT IT IS HARD TO SIT STILL: SEVERAL DAYS
3. WORRYING TOO MUCH ABOUT DIFFERENT THINGS: NOT AT ALL
2. NOT BEING ABLE TO STOP OR CONTROL WORRYING: NOT AT ALL
6. BECOMING EASILY ANNOYED OR IRRITABLE: SEVERAL DAYS
GAD7 TOTAL SCORE: 3
1. FEELING NERVOUS, ANXIOUS, OR ON EDGE: SEVERAL DAYS

## 2020-05-27 ASSESSMENT — PATIENT HEALTH QUESTIONNAIRE - PHQ9
SUM OF ALL RESPONSES TO PHQ QUESTIONS 1-9: 2
5. POOR APPETITE OR OVEREATING: NOT AT ALL

## 2020-05-27 NOTE — PATIENT INSTRUCTIONS
Start azelastine nasal spray one spray to each nostril twice a day. Use this for at least the next 4 weks; extend indefinitely if with recurrent nasal congesetion.    Start sertraline 25 mg daily.  Start buspirone 5 mg 8 hours apart as needed for anxiety attacks.  You will be referred to Sherley Pollard,, behavioral health clinician, for counseling and behavior therapy.    Take your medication as directed.  Full effect/benefit of the medications may not be evident until a few weeks after start.  Treatment of anxiety involves also counseling. Take adequate sleep, and exercise regularly.  If you experience suicidal thoughts, thoughts of hurting others or hallucinations, see a doctor right away.      Follow up with your primary doctor in 1 month for anxiety medication management.    See your eye doctor for the eye symptoms you have been experiencing.    Schedule a clinic visit for the other symptoms you have been experiencing as these need to be examined physically.    Thank you for choosing [facility].  You may be receiving an email and/or telephone survey request from [facility] regarding your visit today.  Please take a few minutes to respond to the survey to let us know how we are doing.      If you have questions or concerns, please contact us via Qpyn or you can contact your care team at [phone].    Our Clinic hours are:  Monday 6:40 am  to 7:00 pm  Tuesday -Friday 6:40 am to 5:00 pm    The [facility] outpatient lab hours are:  Monday - Friday 6:10 am to 4:45 pm  Saturdays 7:00 am to 11:00 am  Appointments are required, call [phone]    If you have clinical questions after hours or would like to schedule an appointment,  call the clinic at [phone].    Patient Education     Anxiety Reaction  Anxiety is the feeling we all get when we think something bad might happen. It is a normal response to stress and usually causes only a mild reaction. When anxiety becomes more severe,  it can interfere with daily life. In some cases, you may not even be aware of what it is you re anxious about. There may also be a genetic link or it may be a learned behavior in the home.  Both psychological and physical triggers cause stress reaction. It's often a response to fear or emotional stress, real or imagined. This stress may come from home, family, work, or social relationships.  During an anxiety reaction, you may feel:    Helpless    Nervous    Depressed    Irritable  Your body may show signs of anxiety in many ways. You may experience:    Dry mouth    Shakiness    Dizziness    Weakness    Trouble breathing    Breathing fast (hyperventilating)    Chest pressure    Sweating    Headache    Nausea    Diarrhea    Tiredness    Inability to sleep    Sexual problems  Home care    Try to locate the sources of stress in your life. They may not be obvious. These may include:  ? Daily hassles of life (such as traffic jams, missed appointments, or car troubles)  ? Major life changes, both good (new baby or job promotion) and bad (loss of job or loss of loved one)  ? Overload: feeling that you have too many responsibilities and can't take care of all of them at once  ? Feeling helpless or feeling that your problems are beyond what you re able to solve    Notice how your body reacts to stress. Learn to listen to your body signals. This will help you take action before the stress becomes severe.    When you can, do something about the source of your stress. (Avoid hassles, limit the amount of change that happens in your life at one time and take a break when you feel overloaded).    Unfortunately, many stressful situations can't be avoided. It is necessary to learn how to better manage stress. There are many proven methods that will reduce your anxiety. These include simple things like exercise, good nutrition, and adequate rest. Also, there are certain techniques that are helpful:  ? Relaxation  ? Breathing  exercises  ? Visualization  ? Biofeedback  ? Meditation  For more information about this, consult your healthcare provider or go to a local bookstore and review the many books and tapes available on this subject.  Follow-up care  If you feel that your anxiety is not responding to self-help measures, contact your healthcare provider or make an appointment with a counselor. You may need short-term psychological counseling and temporary medicine to help you manage stress.  Call 911  Call 911 if any of these happen:    Trouble breathing    Confusion    Drowsiness or trouble wakening    Fainting or loss of consciousness    Rapid heart rate    Seizure    New chest pain that becomes more severe, lasts longer, or spreads into your shoulder, arm, neck, jaw, or back  When to seek medical advice  Call your healthcare provider right away if any of these happen:    Your symptoms get worse    Severe headache not relieved by rest and mild pain reliever  Date Last Reviewed: 10/1/2017    7391-1698 [employer]. [address] [place] [address]. All rights reserved. This information is not intended as a substitute for professional medical care. Always follow your healthcare professional's instructions.           Patient Education     Allergic Rhinitis  Allergic rhinitis is an allergic reaction that affects the nose, and often the eyes. It s often known as nasal allergies. Nasal allergies are often due to things in the environment that are breathed in. Depending what you are sensitive to, nasal allergies may occur only during certain seasons. Or they may occur year round. Common indoor allergens include house dust mites, mold, cockroaches, and pet dander. Outdoor allergens include pollen from trees, grasses, and weeds.   Symptoms include a drippy, stuffy, and itchy nose. They also include sneezing and red and itchy eyes. You may feel tired more often. Severe allergies may also affect your breathing and trigger a  condition called asthma.   Tests can be done to see what allergens are affecting you. You may be referred to an allergy specialist for testing and further evaluation.  Home care  Your healthcare provider may prescribe medicines to help relieve allergy symptoms. These may include oral medicines, nasal sprays, or eye drops.  Ask your provider for advice on how to avoid substances that you are allergic to. Below are a few tips for each type of allergen.  Pet dander:    Do not have pets with fur and feathers.    If you can't avoid having a pet, keep it out of your bedroom and off upholstered furniture.  Pollen:    When pollen counts are high, keep windows of your car and home closed. If possible, use an air conditioner instead.    Wear a filter mask when mowing or doing yard work.  House dust mites:    Wash bedding every week in warm water and detergent and dry on a hot setting.    Cover the mattress, box spring, and pillows with allergy covers.     If possible, sleep in a room with no carpet, curtains, or upholstered furniture.  Cockroaches:    Store food in sealed containers.    Remove garbage from the home promptly.    Fix water leaks  Mold:    Keep humidity low by using a dehumidifier or air conditioner. Keep the dehumidifier and air conditioner clean and free of mold.    Clean moldy areas with bleach and water.  In general:    Vacuum once or twice a week. If possible, use a vacuum with a high-efficiency particulate air (HEPA) filter.    Do not smoke. Avoid cigarette smoke. Cigarette smoke is an irritant that can make symptoms worse.  Follow-up care  Follow up as advised by the healthcare provider or our staff. If you were referred to an allergy specialist, make this appointment promptly.  When to seek medical advice  Call your healthcare provider right away if the following occur:    Coughing or wheezing    Fever of 100.4 F (38 C) or higher, or as directed by your healthcare provider    Raised red bumps  (hives)    Continuing symptoms, new symptoms, or worsening symptoms  Call 911 if you have:    Trouble breathing    Severe swelling of the face or severe itching of the eyes or mouth  Date Last Reviewed: 3/1/2017    9983-1588 [employer]. [address] [place] [id number]. All rights reserved. This information is not intended as a substitute for professional medical care. Always follow your healthcare professional's instructions.

## 2020-05-27 NOTE — Clinical Note
abhishek Oden.    Fco has recurrent anxiety attacks that has increased since he needed to wear masks. He agreed to connect with behavior therapists, so I am referring him to you. I started him on sertraline, and buspar prn.

## 2020-05-27 NOTE — PROGRESS NOTES
"Fco Arroyo is a 70 year old male who is being evaluated via a billable telephone visit.      The patient has been notified of following:     \"This telephone visit will be conducted via a call between you and your physician/provider. We have found that certain health care needs can be provided without the need for a physical exam.  This service lets us provide the care you need with a short phone conversation.  If a prescription is necessary we can send it directly to your pharmacy.  If lab work is needed we can place an order for that and you can then stop by our lab to have the test done at a later time.    Telephone visits are billed at different rates depending on your insurance coverage. During this emergency period, for some insurers they may be billed the same as an in-person visit.  Please reach out to your insurance provider with any questions.    If during the course of the call the physician/provider feels a telephone visit is not appropriate, you will not be charged for this service.\"    Patient has given verbal consent for Telephone visit?  Yes    What phone number would you like to be contacted at? [phone]    How would you like to obtain your AVS? Mail a copy    Subjective     Fco Arroyo is a 70 year old male who presents via phone visit today for the following health issues:    HPI   Anxiety Follow-Up    How are you doing with your anxiety since your last visit? Worsened, See above and below due to having to wear a mask and Covid-19 concerns.  He is still going to work.    He saw Dr. Lan in February and they discussed about the anxiety.  He tried taking Hydroxyzine and that didn't help.    Are you having other symptoms that might be associated with anxiety? Yes:  He is not sleeping well.  Having a hard time wearing a mask as he states he can't breathe.  Has tried many different masks.  Anxiety with crowds and the Covid-19 issues.    Have you had a significant life event? OTHER: " Covid-19.     Are you feeling depressed? Yes:  A little bit, but more the anxiety.    Do you have any concerns with your use of alcohol or other drugs? No    Patient states he hardly wears a mask due to the above, hence, he also is unable to go about a lot of everyday activities or tasks as he did before.  Patient cannot quantify how long he has experienced anxiety but his spouse said it has been a long time.    Social History     Tobacco Use     Smoking status: Former Smoker     Types: Cigarettes     Smokeless tobacco: Never Used   Substance Use Topics     Alcohol use: Yes     Comment: little     Drug use: No     FLOWER-7 SCORE 2/25/2020 5/27/2020   Total Score 4 3     PHQ 2/25/2020 5/27/2020   PHQ-9 Total Score 5 2   Q9: Thoughts of better off dead/self-harm past 2 weeks Not at all Not at all     Last PHQ-9 5/27/2020   1.  Little interest or pleasure in doing things 0   2.  Feeling down, depressed, or hopeless 0   3.  Trouble falling or staying asleep, or sleeping too much 2   4.  Feeling tired or having little energy 0   5.  Poor appetite or overeating 0   6.  Feeling bad about yourself 0   7.  Trouble concentrating 0   8.  Moving slowly or restless 0   Q9: Thoughts of better off dead/self-harm past 2 weeks 0   PHQ-9 Total Score 2   Difficulty at work, home, or with people -     FLOWER-7  5/27/2020   1. Feeling nervous, anxious, or on edge 1   2. Not being able to stop or control worrying 0   3. Worrying too much about different things 0   4. Trouble relaxing 0   5. Being so restless that it is hard to sit still 1   6. Becoming easily annoyed or irritable 1   7. Feeling afraid, as if something awful might happen 0   FLOWER-7 Total Score 3   If you checked any problems, how difficult have they made it for you to do your work, take care of things at home, or get along with other people? -         How many servings of fruits and vegetables do you eat daily?  2-3    On average, how many sweetened beverages do you drink each  day (Examples: soda, juice, sweet tea, etc.  Do NOT count diet or artificially sweetened beverages)?   1    How many days per week do you exercise enough to make your heart beat faster? Zero-due to the feet being swollen.  He did see a Specialist for the one foot.  Now swelling in both feet.    How many minutes a day do you exercise enough to make your heart beat faster? See above.    How many days per week do you miss taking your medication? 0, very seldom, maybe once per month.       OTHER ISSUES THEY NEED TO MAKE AN APPOINTMENT TO ADDRESS:     Swelling:  Swelling of both feet.  He did see a Specialist for this but has not been able to go back yet due to Covid-19. Denies dyspnea, fever or pain.    Shoulder:  He is still having shoulder pain.  Wife states he did go to Physical Therapy.    Eye issues-can't wear a mask:  He has been having issues with his eyes for 5 days.  Wife states floaters.  They have an appointment for him today at 11:00 with [facility].  He states he doesn't know if he can go to this appointment due to having to wear a mask.  He feels claustrophobic.  Has had past issues even with going through a car wash.  He states that is getting better.        Patient Active Problem List   Diagnosis     R PLANTAR FIBROMATOSIS     Sensorineural hearing loss, bilateral     Hyperlipidemia LDL goal <100     Advanced directives, counseling/discussion     Morbid obesity (H)     Benign essential hypertension     Type 2 diabetes mellitus without complication, without long-term current use of insulin (H)     Past Surgical History:   Procedure Laterality Date     SURGICAL HISTORY OF -   1998    open cholecystectomy for gallstones s/p ERCP     SURGICAL HISTORY OF -   1997    cystoscopy     SURGICAL HISTORY OF -   11/1997    Urethral stricture       Social History     Tobacco Use     Smoking status: Former Smoker     Types: Cigarettes     Smokeless tobacco: Never Used   Substance Use Topics     Alcohol use: Yes      Comment: little     Family History   Problem Relation Age of Onset     Diabetes Mother      Hypertension Mother      Hypertension Father      Heart Disease Brother         MI x2, CABG, 1st MI at 49-49 yo.      Hypertension Brother      C.A.D. Brother      Alzheimer Disease Brother      Diabetes Sister          maybe     Melanoma No family hx of          Current Outpatient Medications   Medication Sig Dispense Refill     ASPIRIN 81 MG OR TABS 1 TABLET DAILY       azelastine (ASTELIN) 0.1 % nasal spray Spray 1 spray into both nostrils 2 times daily 30 mL 11     blood glucose (ACCU-CHEK SMARTVIEW) test strip Use to test blood sugar 1 times daily or as directed. 50 strip 12     blood glucose monitoring (ACCU-CHEK FASTCLIX) lancets 1 each daily Use to test blood sugar 1 times daily or as directed. [address]     blood glucose monitoring (ACCU-CHEK SMARTVIEW) test strip Use to test blood sugar 2 times daily or as directed. 200 each 3     busPIRone (BUSPAR) 5 MG tablet Take 1 tablet (5 mg) by mouth every 8 hours as needed (panick/anxiety attacks) 30 tablet 0     cinnamon 500 MG CAPS Take  by mouth.       glipiZIDE (GLIPIZIDE XL) 2.5 MG 24 hr tablet Take 1 tablet (2.5 mg) by mouth daily 90 tablet 3     lisinopril (ZESTRIL) 40 MG tablet Take 1 tablet (40 mg) by mouth daily 90 tablet 3     metFORMIN (GLUCOPHAGE) 1000 MG tablet Take 1 tablet (1,000 mg) by mouth 2 times daily (with meals) 180 tablet 3     Misc Natural Products (TOTAL MEMORY & FOCUS FORMULA) TABS Take 1 tablet by mouth daily       order for DME Trilok Ankle Brace 1 Device 0     Red Yeast Rice Extract 600 MG TABS Taking 1200 mg daily.       sertraline (ZOLOFT) 25 MG tablet Take 1 tablet (25 mg) by mouth daily 30 tablet 0     simvastatin (ZOCOR) 40 MG tablet TAKE ONE TABLET BY MOUTH AT BEDTIME 90 tablet 3     VITAMIN C 500 MG PO TABS 1 TABLET  DAILY       VITAMIN E daily       ciclopirox (PENLAC) 8 % external solution Apply to adjacent skin and affected nails  daily.  Remove with alcohol every 7 days, then repeat. (Patient not taking: Reported on 2/25/2020) 300 mL 3     hydrOXYzine (ATARAX) 10 MG tablet Take 1 tablet (10 mg) by mouth 3 times daily as needed for anxiety (Patient not taking: Reported on 5/27/2020) 60 tablet 1     Allergies   Allergen Reactions     Morphine GI Disturbance       Reviewed and updated as needed this visit by Provider  Tobacco  Allergies  Meds  Problems  Med Hx  Surg Hx  Fam Hx         Review of Systems   Constitutional, HEENT, cardiovascular, pulmonary, GI, , musculoskeletal, neuro, skin, endocrine and psych systems are negative, except as otherwise noted.       Objective   Reported vitals:  There were no vitals taken for this visit.   alert and no distress  PSYCH: Alert and oriented times 3; coherent speech, normal   rate and volume, able to articulate logical thoughts, able   to abstract reason, no tangential thoughts, no hallucinations   or delusions  His affect is anxious  RESP: No cough, no audible wheezing, able to talk in full sentences  Remainder of exam unable to be completed due to telephone visits    Diagnostic Test Results:  none         Assessment/Plan:  1. Generalized anxiety disorder  Patient was advised he has disruptive anxiety symtpoms based on what he described.  - busPIRone (BUSPAR) 5 MG tablet; Take 1 tablet (5 mg) by mouth every 8 hours as needed (panick/anxiety attacks)  Dispense: 30 tablet; Refill: 0  - sertraline (ZOLOFT) 25 MG tablet; Take 1 tablet (25 mg) by mouth daily  Dispense: 30 tablet; Refill: 0  Discussed course and treatment of anxiety disorder.  Advised options for medical treatment.  Recommended multimodal approach to management: medication and counseling. He concurred with both.  Discussed possible side effects of meds.  Discussed maintenance med may take a few weeks before noticing significant benefit.  Patient reports he had jittery side effects with low dose hydroxyzine so he asked for different  med for prn use. Discussed risk of benzos and it not being recommended as fist line tx for his age group. Can try lower dose buspirone. Advised caution on these medications.  Discussed use of ssri. Patient concurred.  Patient will be referred to behavioral health clinician.  Return precautions discussed and given to patient.    2. Chronic vasomotor rhinitis  Patient reports non-atopic patterns.  Try azelastine. Advised expected outcomes.  Return precautions discussed and given to patient.  - azelastine (ASTELIN) 0.1 % nasal spray; Spray 1 spray into both nostrils 2 times daily  Dispense: 30 mL; Refill: 11      Patient was advised to schedule clinic visit for all the other concerns he mentioned as he needs to be physically examined for those.    Patient was advised he needs to see ophthalmology as planned for the vision/eye symptoms he reported.      Return in about 1 week (around 6/3/2020) for for all other concerns..      Phone call duration:  22 minutes    Rian Llanos MD

## 2020-05-28 ENCOUNTER — VIRTUAL VISIT (OUTPATIENT)
Dept: BEHAVIORAL HEALTH | Facility: CLINIC | Age: 70
End: 2020-05-28
Payer: COMMERCIAL

## 2020-05-28 DIAGNOSIS — F43.22 ADJUSTMENT DISORDER WITH ANXIOUS MOOD: Primary | ICD-10-CM

## 2020-05-28 PROCEDURE — 90832 PSYTX W PT 30 MINUTES: CPT | Mod: TEL | Performed by: SOCIAL WORKER

## 2020-05-28 ASSESSMENT — ANXIETY QUESTIONNAIRES: GAD7 TOTAL SCORE: 3

## 2020-06-05 ENCOUNTER — OFFICE VISIT (OUTPATIENT)
Dept: FAMILY MEDICINE | Facility: CLINIC | Age: 70
End: 2020-06-05
Payer: COMMERCIAL

## 2020-06-05 VITALS
WEIGHT: 245 LBS | RESPIRATION RATE: 18 BRPM | DIASTOLIC BLOOD PRESSURE: 72 MMHG | HEIGHT: 67 IN | BODY MASS INDEX: 38.45 KG/M2 | HEART RATE: 86 BPM | TEMPERATURE: 99 F | OXYGEN SATURATION: 96 % | SYSTOLIC BLOOD PRESSURE: 128 MMHG

## 2020-06-05 DIAGNOSIS — H34.8112 CENTRAL RETINAL VEIN OCCLUSION OF RIGHT EYE, UNSPECIFIED COMPLICATION STATUS (H): Primary | ICD-10-CM

## 2020-06-05 DIAGNOSIS — E11.9 TYPE 2 DIABETES MELLITUS WITHOUT COMPLICATION, WITHOUT LONG-TERM CURRENT USE OF INSULIN (H): ICD-10-CM

## 2020-06-05 DIAGNOSIS — R07.9 CHEST PAIN, UNSPECIFIED TYPE: ICD-10-CM

## 2020-06-05 LAB — ERYTHROCYTE [SEDIMENTATION RATE] IN BLOOD BY WESTERGREN METHOD: 9 MM/H (ref 0–20)

## 2020-06-05 PROCEDURE — 00000401 ZZHCL STATISTIC THROMBIN TIME NC: Performed by: FAMILY MEDICINE

## 2020-06-05 PROCEDURE — 93000 ELECTROCARDIOGRAM COMPLETE: CPT | Performed by: FAMILY MEDICINE

## 2020-06-05 PROCEDURE — 36415 COLL VENOUS BLD VENIPUNCTURE: CPT | Performed by: FAMILY MEDICINE

## 2020-06-05 PROCEDURE — 00000167 ZZHCL STATISTIC INR NC: Performed by: FAMILY MEDICINE

## 2020-06-05 PROCEDURE — 81241 F5 GENE: CPT | Performed by: FAMILY MEDICINE

## 2020-06-05 PROCEDURE — 99214 OFFICE O/P EST MOD 30 MIN: CPT | Performed by: FAMILY MEDICINE

## 2020-06-05 PROCEDURE — 85613 RUSSELL VIPER VENOM DILUTED: CPT | Performed by: FAMILY MEDICINE

## 2020-06-05 PROCEDURE — 85730 THROMBOPLASTIN TIME PARTIAL: CPT | Performed by: FAMILY MEDICINE

## 2020-06-05 PROCEDURE — 85652 RBC SED RATE AUTOMATED: CPT | Performed by: FAMILY MEDICINE

## 2020-06-05 PROCEDURE — 83090 ASSAY OF HOMOCYSTEINE: CPT | Performed by: FAMILY MEDICINE

## 2020-06-05 RX ORDER — ROSUVASTATIN CALCIUM 20 MG/1
20 TABLET, COATED ORAL DAILY
Qty: 90 TABLET | Refills: 1 | Status: SHIPPED | OUTPATIENT
Start: 2020-06-05 | End: 2020-12-08

## 2020-06-05 ASSESSMENT — PAIN SCALES - GENERAL: PAINLEVEL: NO PAIN (0)

## 2020-06-05 ASSESSMENT — MIFFLIN-ST. JEOR: SCORE: 1824.94

## 2020-06-05 NOTE — PATIENT INSTRUCTIONS
Stop the simvastatin    Start Rosuvastatin 20 mg     Recheck cholesterol in 3 months with your doctor    Lab work today to rule out other causes of clotting/inflammation.         Our Clinic hours are:  Mondays    7:20 am - 7 pm  Tues -  Fri  7:20 am - 5 pm    Clinic Phone: [phone]    The clinic lab opens at 7:30 am Mon - Fri and appointments are required.    [facility]. [phone]  Monday  8 am - 7pm  Tues - Fri 8 am - 5:30 pm

## 2020-06-05 NOTE — LETTER
Jacque 10, 2020      Foc Jacob  [address] [facility] [place] [address]        Dear ,    We are writing to inform you of your test results.    Homocysteine level normal.    Resulted Orders   ESR: Erythrocyte sedimentation rate   Result Value Ref Range    Sed Rate 9 0 - 20 mm/h   Lupus Anticoagulant Panel   Result Value Ref Range    Lupus Result Negative NEG^Negative      Comment:      (Note)  COMMENTS:  The INR is normal.  APTT ratio is normal.    DRVVT Screen ratio is normal.  Thrombin time is normal.  NEGATIVE TEST; A LUPUS ANTICOAGULANT WAS NOT DETECTED IN THIS  SPECIMEN WITHIN THE LIMITS OF THE TESTING REPERTOIRE.  If the clinical picture is strongly suggestive of an antiphospholipid   syndrome, recommend anticardiolipin and beta-2-glycoprotein (IgG and  IgM) antibody tests.  Kaleigh Owens M.D.  [phone]  6/9/2020                  INR =      1.03        Reference range: 0.86-1.14         Thrombin Time=     16.5        Reference range: 13.0-19.0 sec                         APTT TEST:                 APTT Ratio =     0.97       Normal is less than 1.21                        DILUTE BERTA VIPER VENOM TEST:                 Screen Ratio =     0.97       Normal is less than 1.21          Factor 5 leiden mutation analysis   Result Value Ref Range    Copath Report       Patient Name: FCO JACOB  MR#: [id number]  Specimen #: [id number]  Collected: 6/5/2020 11:33  Received: 6/5/2020 15:31  Reported: 6/8/2020 16:49  Ordering Phy(s): LORENE DILLON  Additional Phy(s): SHARRON HERNANDEZ    For improved result formatting, select 'View Enhanced Report Format' under   Linked Documents section.  _________________________________________    TEST(S) REQUESTED:  Factor 5 Leiden Mutation by PCR    SPECIMEN DESCRIPTION:  Blood    METHODOLOGY:   The regions of genomic DNA containing the [id number] Factor V   Leiden gene mutation (Factor V  Leiden) and the Factor 2(Prothrombin V06107H) gene  mutation were   simultaneously amplified using the polymerase  chain reaction.  The amplified products were digested with restriction   endonuclease TaqI and products were  analyzed by gel electrophoresis.    RESULTS:    Factor V 1691G>A (Leiden)  RESULTS:  Mutation analyzed:     1691G>A  Factor V 1691G>A (Leiden)  Interpretation:      ABSENT  Factor V 1691G>A (Leiden) mu tation  genotype:      G/G    INTERPRETATION:  The patient is negative for the Factor V 1691G>A (Leiden) mutation.    COMMENTS:  If a patient is the recipient of an allogeneic bone marrow transplant,   this test must be done on a  pre-transplant sample or buccal swab.  A previous allogeneic bone marrow   transplant will interfere with test  results.  Call the [facility]([phone]) for   instructions on sample collection for these  patients.    This test was developed and its performance characteristics determined by   [facility]. It has not been cleared or approved by the FDA.   The laboratory is regulated under CLIA  as qualified to perform high-complexity testing. This test is used for   clinical purposes. It should not be  regarded as investigational or for research.    A resident/fellow in an accredited training program was involved in the   selection of testing, review of  laboratory data, and/or interpretation of this ca se.  I, as the senior   physician, attest that I: (i) confirmed  appropriate testing, (ii) examined the relevant raw data for the   specimen(s); and (iii) rendered or confirmed  the interpretation(s).    Electronically Signed Out By:  Dhiraj Judge M.D., Physicians    CPT Codes:  A: 28882-H0UZE, -TDCQRJ    TESTING LAB LOCATION:  [address] [address]  [phone]    COLLECTION SITE:  Client:  [facility]  Location:  [facility]     Homocysteine    Result Value Ref Range    Homocysteine umol/L 11.0 4.0 - 12.0 umol/L      Comment:      A normal plasma homocysteine level likely rules out genetic defects in   homocysteine metabolism.          If you have any questions or concerns, please call the clinic at the number listed above.       Sincerely,        Sandy Clay MD

## 2020-06-05 NOTE — PROGRESS NOTES
"Subjective     Fco Arroyo is a 70 year old male who presents to clinic today for the following health issues:    HPI   Chief Complaint   Patient presents with     Results     Patient has eye exam and was told to follow-up with MD to rule out conditions. Patient has report present.      Patient found to have central retinal vein occlusion of the right eye with optic nerve but not macular edema.  Sent to primary care to r/o other causes of microvascular disease.   Patient is a diabetic.  Last HgbA1c 6.8%.   Last .   Wife told him to stop his aspirin today, we discussed this at length and he was instructed to restart.     Patient also wanted to discuss the \"chest pressure he gets\"  Substernal lasting 10-15 minutes.  Generally comes on at rest.  No associated diaphoresis, shortness of breath, nausea.  No radiation. No heartburn symptoms.        Reviewed and updated as needed this visit by Provider         Review of Systems   Constitutional, HEENT, cardiovascular, pulmonary, gi and gu systems are negative, except as otherwise noted.      Objective    /72   Pulse 86   Temp 99  F (37.2  C) (Tympanic)   Resp 18   Ht 1.702 m (5' 7\")   Wt 111.1 kg (245 lb)   SpO2 96%   BMI 38.37 kg/m    Body mass index is 38.37 kg/m .  Physical Exam   GENERAL: healthy, alert and no distress  NECK: no adenopathy, no asymmetry, masses, or scars and thyroid normal to palpation  RESP: lungs clear to auscultation - no rales, rhonchi or wheezes  CV: regular rate and rhythm, normal S1 S2, no S3 or S4, no murmur, click or rub, no peripheral edema and peripheral pulses strong  ABDOMEN: soft, nontender, no hepatosplenomegaly, no masses and bowel sounds normal  MS: no gross musculoskeletal defects noted, no edema    Diagnostic Test Results:  Labs reviewed in Epic  EKG - NSR, no ST/T changes        ASSESSMENT/PLAN:      ICD-10-CM    1. Central retinal vein occlusion of right eye, unspecified complication status  H34.8112 " rosuvastatin (CRESTOR) 20 MG tablet     ESR: Erythrocyte sedimentation rate     Lupus Anticoagulant Panel     Factor 5 leiden mutation analysis     Homocysteine    associated eye care.      optic nerve edema but no macular edema - 6/4/2020   2. Chest pain, unspecified type  R07.9 EKG 12-lead complete w/read - Clinics     NM MPI Treadmill   3. Type 2 diabetes mellitus without complication, without long-term current use of insulin (H)  E11.9 NM MPI Treadmill     Chest pain needs further evaluation with nuclear stress testing to r/o ischemia.     Lab work to r/o other causes of hypercoag state.    Continue to f/u with ophthalmology.     Continue aspirin  Change to more potent statin - recheck lipids in 3 months, adjust prn.     Patient Instructions     Stop the simvastatin    Start Rosuvastatin 20 mg     Recheck cholesterol in 3 months with your doctor    Lab work today to rule out other causes of clotting/inflammation.     Sandy Clay M.D.      Our Clinic hours are:  Mondays    7:20 am - 7 pm  Tues -  Fri  7:20 am - 5 pm    Clinic Phone: [phone]    The clinic lab opens at 7:30 am Mon - Fri and appointments are required.    [facility] [place]  Ph. [phone]  Monday  8 am - 7pm  Tues - Fri 8 am - 5:30 pm

## 2020-06-08 LAB — COPATH REPORT: NORMAL

## 2020-06-09 LAB — LA PPP-IMP: NEGATIVE

## 2020-06-10 LAB — HCYS SERPL-SCNC: 11 UMOL/L (ref 4–12)

## 2020-06-10 NOTE — RESULT ENCOUNTER NOTE
Lab test looking for causes of increased clotting were negative.   One test is still pending.    Sandy Clay M.D.

## 2020-06-24 ENCOUNTER — TELEPHONE (OUTPATIENT)
Dept: FAMILY MEDICINE | Facility: CLINIC | Age: 70
End: 2020-06-24

## 2020-06-24 DIAGNOSIS — F41.1 GENERALIZED ANXIETY DISORDER: ICD-10-CM

## 2020-06-24 RX ORDER — SERTRALINE HYDROCHLORIDE 25 MG/1
25 TABLET, FILM COATED ORAL DAILY
Qty: 30 TABLET | Refills: 0 | Status: SHIPPED | OUTPATIENT
Start: 2020-06-24 | End: 2020-09-09

## 2020-06-24 NOTE — TELEPHONE ENCOUNTER
Refilled sertraline. Schedule virtual visit for follow up.  Sertraline if taken at night can interrupt with sleep, so be sure to take it in the morning.

## 2020-06-24 NOTE — TELEPHONE ENCOUNTER
Reason for Call:  Other prescription    Detailed comments: Pt is calling and reporting on the medication of Zoloft that was prescribed to him with Dr. Llanos on 5/27/20.  He is out of this pill but reporting it has helped with his anxiety although does not sleep as well.  [facility] is [employer] [facility]    Phone Number Patient can be reached at: Home number on file [phone] (home)    Best Time: any    Can we leave a detailed message on this number? YES    Call taken on 6/24/2020 at 8:38 AM by Mariajose Fournier

## 2020-06-24 NOTE — TELEPHONE ENCOUNTER
Patient had virtual visit 5-27-20 (notes below) for anxiety.  Prescribed sertraline 25 mg daily (taking in morning) and buspirone 5 mg PRN (using very PRN) - both new medications for patient.  He states in the last week he has had difficulty sleeping - both falling and staying asleep.  Denies changes to bedtime routine, increased caffeine or any other changes other than meds started end of May.    Patient would like to know if these medications can effect his sleep?  He is out of sertraline, needing refill today.    Routing to provider.  Akilah REYNOSO RN      Virtual visit notes 5-27-20:  Assessment/Plan:  1. Generalized anxiety disorder  Patient was advised he has disruptive anxiety symtpoms based on what he described.  - busPIRone (BUSPAR) 5 MG tablet; Take 1 tablet (5 mg) by mouth every 8 hours as needed (panick/anxiety attacks)  Dispense: 30 tablet; Refill: 0  - sertraline (ZOLOFT) 25 MG tablet; Take 1 tablet (25 mg) by mouth daily  Dispense: 30 tablet; Refill: 0  Discussed course and treatment of anxiety disorder.  Advised options for medical treatment.  Recommended multimodal approach to management: medication and counseling. He concurred with both.  Discussed possible side effects of meds.  Discussed maintenance med may take a few weeks before noticing significant benefit.  Patient reports he had jittery side effects with low dose hydroxyzine so he asked for different med for prn use. Discussed risk of benzos and it not being recommended as fist line tx for his age group. Can try lower dose buspirone. Advised caution on these medications.  Discussed use of ssri. Patient concurred.  Patient will be referred to behavioral health clinician.  Return precautions discussed and given to patient.

## 2020-06-24 NOTE — TELEPHONE ENCOUNTER
Patient called back, message given to patient with good understanding.  Yandy Parnell on 6/24/2020 at 1:54 PM

## 2020-09-06 DIAGNOSIS — F41.1 GENERALIZED ANXIETY DISORDER: ICD-10-CM

## 2020-09-09 RX ORDER — SERTRALINE HYDROCHLORIDE 25 MG/1
TABLET, FILM COATED ORAL
Qty: 30 TABLET | Refills: 0 | Status: SHIPPED | OUTPATIENT
Start: 2020-09-09 | End: 2020-09-14

## 2020-09-09 NOTE — TELEPHONE ENCOUNTER
"Requested Prescriptions   Pending Prescriptions Disp Refills     sertraline (ZOLOFT) 25 MG tablet [Pharmacy Med Name: Sertraline HCl 25 MG Oral Tablet] 30 tablet 0     Sig: Take 1 tablet by mouth once daily       SSRIs Protocol Passed - 9/6/2020 10:29 AM        Passed - Recent (12 mo) or future (30 days) visit within the authorizing provider's specialty     Patient has had an office visit with the authorizing provider or a provider within the authorizing providers department within the previous 12 mos or has a future within next 30 days. See \"Patient Info\" tab in inbasket, or \"Choose Columns\" in Meds & Orders section of the refill encounter.              Passed - Medication is active on med list        Passed - Patient is age 18 or older             "

## 2020-09-13 DIAGNOSIS — F41.1 GENERALIZED ANXIETY DISORDER: ICD-10-CM

## 2020-09-14 RX ORDER — SERTRALINE HYDROCHLORIDE 25 MG/1
TABLET, FILM COATED ORAL
Qty: 30 TABLET | Refills: 0 | Status: SHIPPED | OUTPATIENT
Start: 2020-09-14 | End: 2020-10-13

## 2020-09-14 NOTE — TELEPHONE ENCOUNTER
"Requested Prescriptions   Pending Prescriptions Disp Refills     sertraline (ZOLOFT) 25 MG tablet [Pharmacy Med Name: Sertraline HCl 25 MG Oral Tablet] 30 tablet 0     Sig: Take 1 tablet by mouth once daily       SSRIs Protocol Passed - 9/13/2020  4:01 PM  FLOWER-7 SCORE 2/25/2020 5/27/2020   Total Score 4 3     PHQ 2/25/2020 5/27/2020   PHQ-9 Total Score 5 2   Q9: Thoughts of better off dead/self-harm past 2 weeks Not at all Not at all           Passed - Recent (12 mo) or future (30 days) visit within the authorizing provider's specialty     Patient has had an office visit with the authorizing provider or a provider within the authorizing providers department within the previous 12 mos or has a future within next 30 days. See \"Patient Info\" tab in inbasket, or \"Choose Columns\" in Meds & Orders section of the refill encounter.              Passed - Medication is active on med list        Passed - Patient is age 18 or older             "

## 2020-09-15 DIAGNOSIS — F41.1 GENERALIZED ANXIETY DISORDER: ICD-10-CM

## 2020-09-15 RX ORDER — BUSPIRONE HYDROCHLORIDE 5 MG/1
TABLET ORAL
Qty: 30 TABLET | Refills: 1 | Status: SHIPPED | OUTPATIENT
Start: 2020-09-15 | End: 2020-10-21

## 2020-09-15 NOTE — TELEPHONE ENCOUNTER
"Requested Prescriptions   Pending Prescriptions Disp Refills     busPIRone (BUSPAR) 5 MG tablet 30 tablet 0       Atypical Antidepressants Protocol Passed - 9/15/2020 11:26 AM        Passed - Recent (12 mo) or future (30 days) visit within the authorizing provider's specialty     Patient has had an office visit with the authorizing provider or a provider within the authorizing providers department within the previous 12 mos or has a future within next 30 days. See \"Patient Info\" tab in inbasket, or \"Choose Columns\" in Meds & Orders section of the refill encounter.              Passed - Medication active on med list        Passed - Patient is age 18 or older             "

## 2020-10-04 ENCOUNTER — HOSPITAL ENCOUNTER (EMERGENCY)
Facility: CLINIC | Age: 70
Discharge: HOME | End: 2020-10-04
Attending: EMERGENCY MEDICINE | Admitting: EMERGENCY MEDICINE
Payer: COMMERCIAL

## 2020-10-04 ENCOUNTER — HOSPITAL ENCOUNTER (EMERGENCY)
Facility: CLINIC | Age: 70
Discharge: HOME | End: 2020-10-04
Attending: FAMILY MEDICINE | Admitting: FAMILY MEDICINE
Payer: COMMERCIAL

## 2020-10-04 ENCOUNTER — NURSE TRIAGE (OUTPATIENT)
Dept: NURSING | Facility: CLINIC | Age: 70
End: 2020-10-04

## 2020-10-04 VITALS
BODY MASS INDEX: 39.4 KG/M2 | DIASTOLIC BLOOD PRESSURE: 87 MMHG | HEART RATE: 77 BPM | HEIGHT: 68 IN | WEIGHT: 260 LBS | TEMPERATURE: 98.4 F | SYSTOLIC BLOOD PRESSURE: 162 MMHG | RESPIRATION RATE: 20 BRPM | OXYGEN SATURATION: 94 %

## 2020-10-04 VITALS
BODY MASS INDEX: 39.53 KG/M2 | HEART RATE: 92 BPM | TEMPERATURE: 97.8 F | RESPIRATION RATE: 20 BRPM | DIASTOLIC BLOOD PRESSURE: 118 MMHG | WEIGHT: 260 LBS | OXYGEN SATURATION: 94 % | SYSTOLIC BLOOD PRESSURE: 168 MMHG

## 2020-10-04 DIAGNOSIS — T83.018A URINARY CATHETER DYSFUNCTION, INITIAL ENCOUNTER (H): ICD-10-CM

## 2020-10-04 DIAGNOSIS — R33.9 URINARY RETENTION: ICD-10-CM

## 2020-10-04 LAB
ALBUMIN UR-MCNC: NEGATIVE MG/DL
ANION GAP SERPL CALCULATED.3IONS-SCNC: 6 MMOL/L (ref 3–14)
APPEARANCE UR: CLEAR
BASOPHILS # BLD AUTO: 0 10E9/L (ref 0–0.2)
BASOPHILS NFR BLD AUTO: 0.5 %
BILIRUB UR QL STRIP: NEGATIVE
BUN SERPL-MCNC: 23 MG/DL (ref 7–30)
CALCIUM SERPL-MCNC: 9.3 MG/DL (ref 8.5–10.1)
CHLORIDE SERPL-SCNC: 110 MMOL/L (ref 94–109)
CO2 SERPL-SCNC: 27 MMOL/L (ref 20–32)
COLOR UR AUTO: YELLOW
CREAT SERPL-MCNC: 1.04 MG/DL (ref 0.66–1.25)
DIFFERENTIAL METHOD BLD: NORMAL
EOSINOPHIL # BLD AUTO: 0.3 10E9/L (ref 0–0.7)
EOSINOPHIL NFR BLD AUTO: 4 %
ERYTHROCYTE [DISTWIDTH] IN BLOOD BY AUTOMATED COUNT: 12.8 % (ref 10–15)
GFR SERPL CREATININE-BSD FRML MDRD: 72 ML/MIN/{1.73_M2}
GLUCOSE SERPL-MCNC: 116 MG/DL (ref 70–99)
GLUCOSE UR STRIP-MCNC: NEGATIVE MG/DL
HCT VFR BLD AUTO: 43 % (ref 40–53)
HGB BLD-MCNC: 14.3 G/DL (ref 13.3–17.7)
HGB UR QL STRIP: ABNORMAL
IMM GRANULOCYTES # BLD: 0 10E9/L (ref 0–0.4)
IMM GRANULOCYTES NFR BLD: 0.4 %
KETONES UR STRIP-MCNC: NEGATIVE MG/DL
LEUKOCYTE ESTERASE UR QL STRIP: NEGATIVE
LYMPHOCYTES # BLD AUTO: 1.5 10E9/L (ref 0.8–5.3)
LYMPHOCYTES NFR BLD AUTO: 19.9 %
MCH RBC QN AUTO: 30.4 PG (ref 26.5–33)
MCHC RBC AUTO-ENTMCNC: 33.3 G/DL (ref 31.5–36.5)
MCV RBC AUTO: 92 FL (ref 78–100)
MONOCYTES # BLD AUTO: 0.6 10E9/L (ref 0–1.3)
MONOCYTES NFR BLD AUTO: 7.7 %
MUCOUS THREADS #/AREA URNS LPF: PRESENT /LPF
NEUTROPHILS # BLD AUTO: 4.9 10E9/L (ref 1.6–8.3)
NEUTROPHILS NFR BLD AUTO: 67.5 %
NITRATE UR QL: NEGATIVE
NRBC # BLD AUTO: 0 10*3/UL
NRBC BLD AUTO-RTO: 0 /100
PH UR STRIP: 5 PH (ref 5–7)
PLATELET # BLD AUTO: 206 10E9/L (ref 150–450)
POTASSIUM SERPL-SCNC: 3.7 MMOL/L (ref 3.4–5.3)
RBC # BLD AUTO: 4.7 10E12/L (ref 4.4–5.9)
RBC #/AREA URNS AUTO: 38 /HPF (ref 0–2)
SODIUM SERPL-SCNC: 143 MMOL/L (ref 133–144)
SOURCE: ABNORMAL
SP GR UR STRIP: 1.02 (ref 1–1.03)
SQUAMOUS #/AREA URNS AUTO: <1 /HPF (ref 0–1)
UROBILINOGEN UR STRIP-MCNC: 0 MG/DL (ref 0–2)
WBC # BLD AUTO: 7.3 10E9/L (ref 4–11)
WBC #/AREA URNS AUTO: 6 /HPF (ref 0–5)

## 2020-10-04 PROCEDURE — 85025 COMPLETE CBC W/AUTO DIFF WBC: CPT | Performed by: EMERGENCY MEDICINE

## 2020-10-04 PROCEDURE — 51702 INSERT TEMP BLADDER CATH: CPT | Performed by: EMERGENCY MEDICINE

## 2020-10-04 PROCEDURE — 51798 US URINE CAPACITY MEASURE: CPT | Performed by: EMERGENCY MEDICINE

## 2020-10-04 PROCEDURE — 258N000003 HC RX IP 258 OP 636: Performed by: EMERGENCY MEDICINE

## 2020-10-04 PROCEDURE — 99284 EMERGENCY DEPT VISIT MOD MDM: CPT | Mod: 25 | Performed by: EMERGENCY MEDICINE

## 2020-10-04 PROCEDURE — 96360 HYDRATION IV INFUSION INIT: CPT | Mod: 59 | Performed by: EMERGENCY MEDICINE

## 2020-10-04 PROCEDURE — 81001 URINALYSIS AUTO W/SCOPE: CPT | Performed by: EMERGENCY MEDICINE

## 2020-10-04 PROCEDURE — 80048 BASIC METABOLIC PNL TOTAL CA: CPT | Performed by: EMERGENCY MEDICINE

## 2020-10-04 PROCEDURE — 96361 HYDRATE IV INFUSION ADD-ON: CPT | Performed by: EMERGENCY MEDICINE

## 2020-10-04 PROCEDURE — 250N000009 HC RX 250: Performed by: EMERGENCY MEDICINE

## 2020-10-04 PROCEDURE — 87086 URINE CULTURE/COLONY COUNT: CPT | Performed by: FAMILY MEDICINE

## 2020-10-04 PROCEDURE — 99283 EMERGENCY DEPT VISIT LOW MDM: CPT | Performed by: FAMILY MEDICINE

## 2020-10-04 PROCEDURE — 99284 EMERGENCY DEPT VISIT MOD MDM: CPT | Performed by: EMERGENCY MEDICINE

## 2020-10-04 RX ORDER — TAMSULOSIN HYDROCHLORIDE 0.4 MG/1
0.4 CAPSULE ORAL DAILY
Qty: 30 CAPSULE | Refills: 0 | Status: SHIPPED | OUTPATIENT
Start: 2020-10-04 | End: 2020-10-14

## 2020-10-04 RX ORDER — LIDOCAINE HYDROCHLORIDE 20 MG/ML
JELLY TOPICAL ONCE
Status: COMPLETED | OUTPATIENT
Start: 2020-10-04 | End: 2020-10-04

## 2020-10-04 RX ADMIN — LIDOCAINE HYDROCHLORIDE: 20 JELLY TOPICAL at 05:37

## 2020-10-04 RX ADMIN — SODIUM CHLORIDE, POTASSIUM CHLORIDE, SODIUM LACTATE AND CALCIUM CHLORIDE 1000 ML: 600; 310; 30; 20 INJECTION, SOLUTION INTRAVENOUS at 05:48

## 2020-10-04 ASSESSMENT — MIFFLIN-ST. JEOR: SCORE: 1908.85

## 2020-10-04 ASSESSMENT — ENCOUNTER SYMPTOMS
HEMATURIA: 0
FATIGUE: 0
LIGHT-HEADEDNESS: 0
HEADACHES: 0
VOMITING: 0
FREQUENCY: 1
BACK PAIN: 0
NAUSEA: 0
APPETITE CHANGE: 0
FEVER: 0
COUGH: 0
CHEST TIGHTNESS: 0
FLANK PAIN: 0
ABDOMINAL PAIN: 0
CHILLS: 0
SHORTNESS OF BREATH: 0
DYSURIA: 1

## 2020-10-04 NOTE — ED AVS SNAPSHOT
[facility] Emergency Dept  [address] [address]  Phone: [phone]  Fax: [phone]                                    Fco Arroyo   MRN: [id number]    Department: [facility] Emergency Dept   Date of Visit: 10/4/2020           After Visit Summary Signature Page    I have received my discharge instructions, and my questions have been answered. I have discussed any challenges I see with this plan with the nurse or doctor.    ..........................................................................................................................................  Patient/Patient Representative Signature      ..........................................................................................................................................  Patient Representative Print Name and Relationship to Patient    ..................................................               ................................................  Date                                   Time    ..........................................................................................................................................  Reviewed by Signature/Title    ...................................................              ..............................................  Date                                               Time          22EPIC Rev 08/18

## 2020-10-04 NOTE — ED NOTES
Pt reports blood coming from catheter site, pt reports catheter feels uncomfortable. Pt reports securement device was pulling and needed to be adjusted, pt removed and now is not sticking

## 2020-10-04 NOTE — ED PROVIDER NOTES
History     Chief Complaint   Patient presents with     Urinary Retention     HPI  Fco Arroyo is a 70 year old male with history of obesity and type 2 diabetes presenting for evaluation of difficulty urination.  Patient reports over the past roughly 24 hours he has had some increased frequency and burning with urination but decreased urine output.  Patient reports that he feels need to urinate but can only get a few drops out.  Denies any fevers.  Denies any back pain or abdominal pain.  No nausea or vomiting.  Has been feeling well prior to this recently.  Does report having a urinary tract infection with urinary retention about 45 years ago but denies any other long-term urinary or prostate problems.  Denies any trauma.    Allergies:  Allergies   Allergen Reactions     Morphine GI Disturbance       Problem List:    Patient Active Problem List    Diagnosis Date Noted     Central retinal vein occlusion of right eye, unspecified complication status 06/05/2020     Priority: Medium     Benign essential hypertension 11/16/2016     Priority: Medium     Type 2 diabetes mellitus without complication, without long-term current use of insulin (H) 11/16/2016     Priority: Medium     Morbid obesity (H) 10/25/2015     Priority: Medium     Advanced directives, counseling/discussion 02/13/2013     Priority: Medium     Discussed advance care planning with patient; information given to patient to review. 2/13/2013          Hyperlipidemia LDL goal <100 11/26/2008     Priority: Medium     with DM2.  LDL a little high - max simvastatin - had changed fm lipitor.       Sensorineural hearing loss, bilateral 11/22/2007     Priority: Medium     gradual - referral to audiology       R PLANTAR FIBROMATOSIS 07/16/2007     Priority: Medium     change footwear,  conservative care.           Past Medical History:    Past Medical History:   Diagnosis Date     DIABETES MELLITUS TYPE II-UNCOMPL 8/17/2005     Essential hypertension, benign       Other and unspecified hyperlipidemia      Other isolated or specific phobias      Other specified causes of urethral stricture      Squamous cell carcinoma        Past Surgical History:    Past Surgical History:   Procedure Laterality Date     SURGICAL HISTORY OF -   1998    open cholecystectomy for gallstones s/p ERCP     SURGICAL HISTORY OF -   1997    cystoscopy     SURGICAL HISTORY OF -   11/1997    Urethral stricture       Family History:    Family History   Problem Relation Age of Onset     Diabetes Mother      Hypertension Mother      Hypertension Father      Heart Disease Brother         MI x2, CABG, 1st MI at 49-49 yo.      Hypertension Brother      C.A.D. Brother      Alzheimer Disease Brother      Diabetes Sister          maybe     Coronary Artery Disease Brother      Heart Disease Brother      Melanoma No family hx of        Social History:  Marital Status:   [2]  Social History     Tobacco Use     Smoking status: Former Smoker     Types: Cigarettes     Smokeless tobacco: Never Used   Substance Use Topics     Alcohol use: Yes     Comment: little     Drug use: No        Medications:         tamsulosin (FLOMAX) 0.4 MG capsule       ASPIRIN 81 MG OR TABS       azelastine (ASTELIN) 0.1 % nasal spray       blood glucose (ACCU-CHEK SMARTVIEW) test strip       blood glucose monitoring (ACCU-CHEK FASTCLIX) lancets       blood glucose monitoring (ACCU-CHEK SMARTVIEW) test strip       busPIRone (BUSPAR) 5 MG tablet       cinnamon 500 MG CAPS       glipiZIDE (GLIPIZIDE XL) 2.5 MG 24 hr tablet       lisinopril (ZESTRIL) 40 MG tablet       metFORMIN (GLUCOPHAGE) 1000 MG tablet       Misc Natural Products (TOTAL MEMORY & FOCUS FORMULA) TABS       order for DME       Red Yeast Rice Extract 600 MG TABS       rosuvastatin (CRESTOR) 20 MG tablet       sertraline (ZOLOFT) 25 MG tablet       VITAMIN C 500 MG PO TABS       VITAMIN E          Review of Systems   Constitutional: Negative for appetite change, chills,  "fatigue and fever.   HENT: Negative for congestion.    Respiratory: Negative for cough, chest tightness and shortness of breath.    Cardiovascular: Negative for chest pain.   Gastrointestinal: Negative for abdominal pain, nausea and vomiting.   Genitourinary: Positive for decreased urine volume, dysuria, frequency and urgency. Negative for flank pain and hematuria.   Musculoskeletal: Negative for back pain.   Skin: Negative for rash.   Neurological: Negative for light-headedness and headaches.   All other systems reviewed and are negative.      Physical Exam   BP: (!) 235/128  Pulse: 94  Temp: 98.4  F (36.9  C)  Resp: 20  Height: 172.7 cm (5' 8\")  Weight: 117.9 kg (260 lb)  SpO2: 95 %      Physical Exam  Vitals signs and nursing note reviewed.   Constitutional:       Appearance: He is obese. He is not ill-appearing or diaphoretic.      Comments: Patient standing upright at the side of the bed, mildly anxious appearing   HENT:      Head: Atraumatic.      Nose: Nose normal.      Mouth/Throat:      Mouth: Mucous membranes are moist.   Eyes:      Conjunctiva/sclera: Conjunctivae normal.   Cardiovascular:      Rate and Rhythm: Normal rate.      Pulses: Normal pulses.   Pulmonary:      Effort: Pulmonary effort is normal.   Abdominal:      General: There is distension.      Palpations: Abdomen is soft.      Tenderness: There is no abdominal tenderness. There is no guarding.   Skin:     General: Skin is warm and dry.      Capillary Refill: Capillary refill takes less than 2 seconds.   Neurological:      Mental Status: He is alert and oriented to person, place, and time.   Psychiatric:         Attention and Perception: Attention normal.         Mood and Affect: Mood is anxious.         ED Course        Procedures                 Results for orders placed or performed during the hospital encounter of 10/04/20 (from the past 24 hour(s))   CBC with platelets differential   Result Value Ref Range    WBC 7.3 4.0 - 11.0 10e9/L    " RBC Count 4.70 4.4 - 5.9 10e12/L    Hemoglobin 14.3 13.3 - 17.7 g/dL    Hematocrit 43.0 40.0 - 53.0 %    MCV 92 78 - 100 fl    MCH 30.4 26.5 - 33.0 pg    MCHC 33.3 31.5 - 36.5 g/dL    RDW 12.8 10.0 - 15.0 %    Platelet Count 206 150 - 450 10e9/L    Diff Method Automated Method     % Neutrophils 67.5 %    % Lymphocytes 19.9 %    % Monocytes 7.7 %    % Eosinophils 4.0 %    % Basophils 0.5 %    % Immature Granulocytes 0.4 %    Nucleated RBCs 0 0 /100    Absolute Neutrophil 4.9 1.6 - 8.3 10e9/L    Absolute Lymphocytes 1.5 0.8 - 5.3 10e9/L    Absolute Monocytes 0.6 0.0 - 1.3 10e9/L    Absolute Eosinophils 0.3 0.0 - 0.7 10e9/L    Absolute Basophils 0.0 0.0 - 0.2 10e9/L    Abs Immature Granulocytes 0.0 0 - 0.4 10e9/L    Absolute Nucleated RBC 0.0    Basic metabolic panel   Result Value Ref Range    Sodium 143 133 - 144 mmol/L    Potassium 3.7 3.4 - 5.3 mmol/L    Chloride 110 (H) 94 - 109 mmol/L    Carbon Dioxide 27 20 - 32 mmol/L    Anion Gap 6 3 - 14 mmol/L    Glucose 116 (H) 70 - 99 mg/dL    Urea Nitrogen 23 7 - 30 mg/dL    Creatinine 1.04 0.66 - 1.25 mg/dL    GFR Estimate 72 >60 mL/min/[1.73_m2]    GFR Estimate If Black 83 >60 mL/min/[1.73_m2]    Calcium 9.3 8.5 - 10.1 mg/dL   UA reflex to Microscopic   Result Value Ref Range    Color Urine Yellow     Appearance Urine Clear     Glucose Urine Negative NEG^Negative mg/dL    Bilirubin Urine Negative NEG^Negative    Ketones Urine Negative NEG^Negative mg/dL    Specific Gravity Urine 1.021 1.003 - 1.035    Blood Urine Small (A) NEG^Negative    pH Urine 5.0 5.0 - 7.0 pH    Protein Albumin Urine Negative NEG^Negative mg/dL    Urobilinogen mg/dL 0.0 0.0 - 2.0 mg/dL    Nitrite Urine Negative NEG^Negative    Leukocyte Esterase Urine Negative NEG^Negative    Source Catheterized Urine     RBC Urine 38 (H) 0 - 2 /HPF    WBC Urine 6 (H) 0 - 5 /HPF    Squamous Epithelial /HPF Urine <1 0 - 1 /HPF    Mucous Urine Present (A) NEG^Negative /LPF   Urine Culture Aerobic Bacterial     Specimen: Midstream Urine   Result Value Ref Range    Specimen Description Midstream Urine     Special Requests Specimen received in preservative     Culture Micro PENDING        Medications   lidocaine (XYLOCAINE) 2 % external gel ( Topical Given 10/4/20 0537)   lactated ringers BOLUS 1,000 mL (0 mLs Intravenous Stopped 10/4/20 0722)     6:04 AM: Patient was signed out at shift change to Dr Vieira.        Assessments & Plan (with Medical Decision Making)  70-year-old male with history of obesity and type 2 diabetes presenting for evaluation of difficulty urinating past day.  Patient reports some possible urinary symptoms and difficulty voiding.  Has had similar problems in the past with a UTI.  Patient presented shortly for shift change.  Initial bladder scan showed near to 400 cc of urine in the bladder and patient was unable to void.  Jung catheter placed and urinalysis obtained.  Results not available at the time of shift change the patient signed out for reassessment and reevaluation after labs and urinalysis resulted.     I have reviewed the nursing notes.    I have reviewed the findings, diagnosis, plan and need for follow up with the patient.       Discharge Medication List as of 10/4/2020  7:23 AM      START taking these medications    Details   tamsulosin (FLOMAX) 0.4 MG capsule Take 1 capsule (0.4 mg) by mouth daily for 10 doses, Disp-30 capsule, R-0, E-Prescribe             Final diagnoses:   Urinary retention - suspect this is due to prostate and likely precipitated by OTC cough med.  avoid this medication, 350ml out with catheter.  keep in for 1 week while starting flomax and follow-up urology. await urine cul;ture.  return for fever.       10/4/2020   [facility] EMERGENCY DEPT     Elena, Mukul Kahn MD  10/04/20 8007

## 2020-10-04 NOTE — TELEPHONE ENCOUNTER
Wife Harmony is calling and was discharged from ED and now is bleeding outside around the catheter and feels pressure that catheter is not where it is suppose to be.  Wife Harmony states that they will return to ED.    COVID 19 Nurse Triage Plan/Patient Instructions    Please be aware that novel coronavirus (COVID-19) may be circulating in the community. If you develop symptoms such as fever, cough, or SOB or if you have concerns about the presence of another infection including coronavirus (COVID-19), please contact your health care provider or visit [email or web address].     Disposition/Instructions    ED Visit recommended. Follow protocol based instructions.     Bring Your Own Device:  Please also bring your smart device(s) (smart phones, tablets, laptops) and their charging cables for your personal use and to communicate with your care team during your visit.    Thank you for taking steps to prevent the spread of this virus.  o Limit your contact with others.  o Wear a simple mask to cover your cough.  o Wash your hands well and often.    Resources    [facility]: About COVID-19: [email or web address]    CDC: What to Do If You're Sick: www.cdc.gov/coronavirus/2019-ncov/about/steps-when-sick.html    CDC: Ending Home Isolation: www.cdc.gov/coronavirus/2019-ncov/hcp/disposition-in-home-patients.html     CDC: Caring for Someone: www.cdc.gov/coronavirus/2019-ncov/if-you-are-sick/care-for-someone.html     [facility]: Interim Guidance for Hospital Discharge to Home: www.health.Pending sale to [facility].[place]/diseases/coronavirus/hcp/hospdischarge.pdf    [facility] clinical trials (COVID-19 research studies): clinicalaffairs.[facility]-clinical-trials     Below are the COVID-19 hotlines at the [place] Department of Health ([facility]). Interpreters are available.   o For health questions: Call 023-978-5603 or 1-218.465.6817 (7 a.m. to 7 p.m.)  o For questions about schools and childcare: Call 450-457-0511 or 1-617.101.4668 (7 a.m.  to 7 p.m.)                       Reason for Disposition    [1] Catheter was accidentally pulled-out AND [2] bright red continuous bleeding    Additional Information    Negative: Shock suspected (e.g., cold/pale/clammy skin, too weak to stand, low BP, rapid pulse)    Negative: Sounds like a life-threatening emergency to the triager    Protocols used: URINARY CATHETER SYMPTOMS AND [id number]

## 2020-10-04 NOTE — DISCHARGE INSTRUCTIONS
ICD-10-CM    1. Urinary retention  R33.9 UROLOGY ADULT REFERRAL    suspect this is due to prostate and likely precipitated by OTC cough med.  avoid this medication, 350ml out with catheter.  keep in for 1 week while starting flomax and follow-up urology. await urine cul;ture.  return for fever.

## 2020-10-04 NOTE — ED AVS SNAPSHOT
[facility] Emergency Dept  [address] [address]  Phone: [phone]  Fax: [phone]                                    Fco Arroyo   MRN: [id number]    Department: [facility] Emergency Dept   Date of Visit: 10/4/2020           After Visit Summary Signature Page    I have received my discharge instructions, and my questions have been answered. I have discussed any challenges I see with this plan with the nurse or doctor.    ..........................................................................................................................................  Patient/Patient Representative Signature      ..........................................................................................................................................  Patient Representative Print Name and Relationship to Patient    ..................................................               ................................................  Date                                   Time    ..........................................................................................................................................  Reviewed by Signature/Title    ...................................................              ..............................................  Date                                               Time          22EPIC Rev 08/18

## 2020-10-04 NOTE — ED PROVIDER NOTES
"     Emergency Department Patient Sign-out       Brief HPI:  This is a 70 year old male signed out to me by Dr. Elena .  See initial ED Provider note for details of the presentation.     with UTI symptoms ?urinary retention           Significant Events prior to my assuming care: catheterization      Exam:   Patient Vitals for the past 24 hrs:   BP Temp Temp src Pulse Resp SpO2 Height Weight   10/04/20 0518 (!) 235/128 98.4  F (36.9  C) Oral 94 20 95 % 1.727 m (5' 8\") 117.9 kg (260 lb)           ED RESULTS:   Results for orders placed or performed during the hospital encounter of 10/04/20 (from the past 24 hour(s))   CBC with platelets differential     Status: None    Collection Time: 10/04/20  5:47 AM   Result Value Ref Range    WBC 7.3 4.0 - 11.0 10e9/L    RBC Count 4.70 4.4 - 5.9 10e12/L    Hemoglobin 14.3 13.3 - 17.7 g/dL    Hematocrit 43.0 40.0 - 53.0 %    MCV 92 78 - 100 fl    MCH 30.4 26.5 - 33.0 pg    MCHC 33.3 31.5 - 36.5 g/dL    RDW 12.8 10.0 - 15.0 %    Platelet Count 206 150 - 450 10e9/L    Diff Method Automated Method     % Neutrophils 67.5 %    % Lymphocytes 19.9 %    % Monocytes 7.7 %    % Eosinophils 4.0 %    % Basophils 0.5 %    % Immature Granulocytes 0.4 %    Nucleated RBCs 0 0 /100    Absolute Neutrophil 4.9 1.6 - 8.3 10e9/L    Absolute Lymphocytes 1.5 0.8 - 5.3 10e9/L    Absolute Monocytes 0.6 0.0 - 1.3 10e9/L    Absolute Eosinophils 0.3 0.0 - 0.7 10e9/L    Absolute Basophils 0.0 0.0 - 0.2 10e9/L    Abs Immature Granulocytes 0.0 0 - 0.4 10e9/L    Absolute Nucleated RBC 0.0    Basic metabolic panel     Status: Abnormal    Collection Time: 10/04/20  5:47 AM   Result Value Ref Range    Sodium 143 133 - 144 mmol/L    Potassium 3.7 3.4 - 5.3 mmol/L    Chloride 110 (H) 94 - 109 mmol/L    Carbon Dioxide 27 20 - 32 mmol/L    Anion Gap 6 3 - 14 mmol/L    Glucose 116 (H) 70 - 99 mg/dL    Urea Nitrogen 23 7 - 30 mg/dL    Creatinine 1.04 0.66 - 1.25 mg/dL    GFR Estimate 72 >60 mL/min/[1.73_m2]    GFR " Estimate If Black 83 >60 mL/min/[1.73_m2]    Calcium 9.3 8.5 - 10.1 mg/dL   UA reflex to Microscopic     Status: Abnormal    Collection Time: 10/04/20  6:01 AM   Result Value Ref Range    Color Urine Yellow     Appearance Urine Clear     Glucose Urine Negative NEG^Negative mg/dL    Bilirubin Urine Negative NEG^Negative    Ketones Urine Negative NEG^Negative mg/dL    Specific Gravity Urine 1.021 1.003 - 1.035    Blood Urine Small (A) NEG^Negative    pH Urine 5.0 5.0 - 7.0 pH    Protein Albumin Urine Negative NEG^Negative mg/dL    Urobilinogen mg/dL 0.0 0.0 - 2.0 mg/dL    Nitrite Urine Negative NEG^Negative    Leukocyte Esterase Urine Negative NEG^Negative    Source Catheterized Urine     RBC Urine 38 (H) 0 - 2 /HPF    WBC Urine 6 (H) 0 - 5 /HPF    Squamous Epithelial /HPF Urine <1 0 - 1 /HPF    Mucous Urine Present (A) NEG^Negative /LPF       ED MEDICATIONS:   Medications   lactated ringers BOLUS 1,000 mL (1,000 mLs Intravenous New Bag 10/4/20 0548)   lidocaine (XYLOCAINE) 2 % external gel ( Topical Given 10/4/20 0537)         Impression:    ICD-10-CM    1. Urinary retention  R33.9 UROLOGY ADULT REFERRAL    suspect this is due to prostate and likely precipitated by OTC cough med.  avoid this medication, 350ml out with catheter.  keep in for 1 week while starting flomax and follow-up urology. await urine cul;ture.  return for fever.       Plan:    Pending studies include urinae catheterization.      The urine catheterization results from 350 cc out.  Urinalysis is not consistent with UTI.  There are blood cells present but this is due to catheterization likely.  We will place the patient on Flomax and have him follow-up with urology 1 week consult placed.  Precautions given for return.      MD Dariel Reid Scott J, MD  10/04/20 0742

## 2020-10-04 NOTE — DISCHARGE INSTRUCTIONS
ICD-10-CM    1. Urinary catheter dysfunction, initial encounter (H)  T83.018A     This appears to be in the correct place. the positioning of the securing ring was likely too low and was pulling.  This should not cause additional problems.  return for worsening.

## 2020-10-04 NOTE — ED TRIAGE NOTES
"Pt hasn't been able to void since yesterday late afternoon. Pt is able to start voiding, but \"it only comes out in little bits\" BS for 373 ml  "

## 2020-10-04 NOTE — ED PROVIDER NOTES
History     Chief Complaint   Patient presents with     Catheter Problem     bleeding around the servin catheter     HPI  Fco Arroyo is a 70 year old male who I saw earlier today for urinary catheter placements for urinary retention 350 C retention.  Started Flomax.  Discharged home.  Returns with the catheter pulling when he walked and some blood at the meatus.  He has repositions himself but the securing ring is now loose and had to tape it at home.  He has no associated fever.  No other urinary changes other than he has a sense of urgency with a catheter in place.  No abdominal pain.      Allergies:  Allergies   Allergen Reactions     Morphine GI Disturbance       Problem List:    Patient Active Problem List    Diagnosis Date Noted     Central retinal vein occlusion of right eye, unspecified complication status 06/05/2020     Priority: Medium     Benign essential hypertension 11/16/2016     Priority: Medium     Type 2 diabetes mellitus without complication, without long-term current use of insulin (H) 11/16/2016     Priority: Medium     Morbid obesity (H) 10/25/2015     Priority: Medium     Advanced directives, counseling/discussion 02/13/2013     Priority: Medium     Discussed advance care planning with patient; information given to patient to review. 2/13/2013          Hyperlipidemia LDL goal <100 11/26/2008     Priority: Medium     with DM2.  LDL a little high - max simvastatin - had changed fm lipitor.       Sensorineural hearing loss, bilateral 11/22/2007     Priority: Medium     gradual - referral to audiology       R PLANTAR FIBROMATOSIS 07/16/2007     Priority: Medium     change footwear,  conservative care.           Past Medical History:    Past Medical History:   Diagnosis Date     DIABETES MELLITUS TYPE II-UNCOMPL 8/17/2005     Essential hypertension, benign      Other and unspecified hyperlipidemia      Other isolated or specific phobias      Other specified causes of urethral stricture       Squamous cell carcinoma        Past Surgical History:    Past Surgical History:   Procedure Laterality Date     SURGICAL HISTORY OF -   1998    open cholecystectomy for gallstones s/p ERCP     SURGICAL HISTORY OF -   1997    cystoscopy     SURGICAL HISTORY OF -   11/1997    Urethral stricture       Family History:    Family History   Problem Relation Age of Onset     Diabetes Mother      Hypertension Mother      Hypertension Father      Heart Disease Brother         MI x2, CABG, 1st MI at 49-51 yo.      Hypertension Brother      C.A.D. Brother      Alzheimer Disease Brother      Diabetes Sister          maybe     Coronary Artery Disease Brother      Heart Disease Brother      Melanoma No family hx of        Social History:  Marital Status:   [2]  Social History     Tobacco Use     Smoking status: Former Smoker     Types: Cigarettes     Smokeless tobacco: Never Used   Substance Use Topics     Alcohol use: Yes     Comment: little     Drug use: No        Medications:         ASPIRIN 81 MG OR TABS       azelastine (ASTELIN) 0.1 % nasal spray       blood glucose (ACCU-CHEK SMARTVIEW) test strip       blood glucose monitoring (ACCU-CHEK FASTCLIX) lancets       blood glucose monitoring (ACCU-CHEK SMARTVIEW) test strip       busPIRone (BUSPAR) 5 MG tablet       cinnamon 500 MG CAPS       glipiZIDE (GLIPIZIDE XL) 2.5 MG 24 hr tablet       lisinopril (ZESTRIL) 40 MG tablet       metFORMIN (GLUCOPHAGE) 1000 MG tablet       Misc Natural Products (TOTAL MEMORY & FOCUS FORMULA) TABS       order for DME       Red Yeast Rice Extract 600 MG TABS       rosuvastatin (CRESTOR) 20 MG tablet       sertraline (ZOLOFT) 25 MG tablet       tamsulosin (FLOMAX) 0.4 MG capsule       VITAMIN C 500 MG PO TABS       VITAMIN E          Review of Systems   ROS:  5 point ROS negative except as noted above in HPI, including Gen., Resp., CV, GI &  system review.      Physical Exam   BP: (!) 180/130  Pulse: 95  Temp: 97.8  F (36.6  C)  Resp:  24  Weight: 117.9 kg (260 lb)  SpO2: 93 %      Physical Exam     She was small amount of blood near the meatus.  There is no significant trauma here.  No active bleeding.  The bedside ultrasound demonstrates the catheter -but no obvious retained fluid.  Abdomen is benign.  There is no rash.      ED Course        Procedures               Critical Care time:  none               Results for orders placed or performed during the hospital encounter of 10/04/20 (from the past 24 hour(s))   CBC with platelets differential   Result Value Ref Range    WBC 7.3 4.0 - 11.0 10e9/L    RBC Count 4.70 4.4 - 5.9 10e12/L    Hemoglobin 14.3 13.3 - 17.7 g/dL    Hematocrit 43.0 40.0 - 53.0 %    MCV 92 78 - 100 fl    MCH 30.4 26.5 - 33.0 pg    MCHC 33.3 31.5 - 36.5 g/dL    RDW 12.8 10.0 - 15.0 %    Platelet Count 206 150 - 450 10e9/L    Diff Method Automated Method     % Neutrophils 67.5 %    % Lymphocytes 19.9 %    % Monocytes 7.7 %    % Eosinophils 4.0 %    % Basophils 0.5 %    % Immature Granulocytes 0.4 %    Nucleated RBCs 0 0 /100    Absolute Neutrophil 4.9 1.6 - 8.3 10e9/L    Absolute Lymphocytes 1.5 0.8 - 5.3 10e9/L    Absolute Monocytes 0.6 0.0 - 1.3 10e9/L    Absolute Eosinophils 0.3 0.0 - 0.7 10e9/L    Absolute Basophils 0.0 0.0 - 0.2 10e9/L    Abs Immature Granulocytes 0.0 0 - 0.4 10e9/L    Absolute Nucleated RBC 0.0    Basic metabolic panel   Result Value Ref Range    Sodium 143 133 - 144 mmol/L    Potassium 3.7 3.4 - 5.3 mmol/L    Chloride 110 (H) 94 - 109 mmol/L    Carbon Dioxide 27 20 - 32 mmol/L    Anion Gap 6 3 - 14 mmol/L    Glucose 116 (H) 70 - 99 mg/dL    Urea Nitrogen 23 7 - 30 mg/dL    Creatinine 1.04 0.66 - 1.25 mg/dL    GFR Estimate 72 >60 mL/min/[1.73_m2]    GFR Estimate If Black 83 >60 mL/min/[1.73_m2]    Calcium 9.3 8.5 - 10.1 mg/dL   UA reflex to Microscopic   Result Value Ref Range    Color Urine Yellow     Appearance Urine Clear     Glucose Urine Negative NEG^Negative mg/dL    Bilirubin Urine Negative  NEG^Negative    Ketones Urine Negative NEG^Negative mg/dL    Specific Gravity Urine 1.021 1.003 - 1.035    Blood Urine Small (A) NEG^Negative    pH Urine 5.0 5.0 - 7.0 pH    Protein Albumin Urine Negative NEG^Negative mg/dL    Urobilinogen mg/dL 0.0 0.0 - 2.0 mg/dL    Nitrite Urine Negative NEG^Negative    Leukocyte Esterase Urine Negative NEG^Negative    Source Catheterized Urine     RBC Urine 38 (H) 0 - 2 /HPF    WBC Urine 6 (H) 0 - 5 /HPF    Squamous Epithelial /HPF Urine <1 0 - 1 /HPF    Mucous Urine Present (A) NEG^Negative /LPF       Medications - No data to display    Assessments & Plan (with Medical Decision Making)     MDM: Fco Arroyo is a 70 year old male who returns to the emergency department due to the catheter pulling likely because the attachment site was too low on the leg.  It is been repositioned.  There is no signs of urinary reaccumulation.  Does appear that the catheter is in place.  Precautions are given for return.    I have reviewed the nursing notes.    I have reviewed the findings, diagnosis, plan and need for follow up with the patient.       New Prescriptions    No medications on file       Final diagnoses:   Urinary catheter dysfunction, initial encounter (H) - This appears to be in the correct place. the positioning of the securing ring was likely too low and was pulling.  This should not cause additional problems.  return for worsening.       10/4/2020   [facility] EMERGENCY DEPT     Osbaldo Vieira MD  10/04/20 1100

## 2020-10-05 LAB
BACTERIA SPEC CULT: NO GROWTH
Lab: NORMAL
SPECIMEN SOURCE: NORMAL

## 2020-10-05 NOTE — RESULT ENCOUNTER NOTE
Final urine culture report is NEGATIVE per [facility] ED Lab Result protocol.    If NEGATIVE result, no change in treatment, per [facility] ED Lab Result protocol.

## 2020-10-07 ENCOUNTER — HOSPITAL ENCOUNTER (EMERGENCY)
Facility: CLINIC | Age: 70
Discharge: HOME | End: 2020-10-07
Attending: EMERGENCY MEDICINE | Admitting: EMERGENCY MEDICINE
Payer: COMMERCIAL

## 2020-10-07 VITALS
SYSTOLIC BLOOD PRESSURE: 173 MMHG | OXYGEN SATURATION: 96 % | WEIGHT: 260 LBS | BODY MASS INDEX: 39.4 KG/M2 | DIASTOLIC BLOOD PRESSURE: 84 MMHG | RESPIRATION RATE: 16 BRPM | HEIGHT: 68 IN | HEART RATE: 73 BPM | TEMPERATURE: 97.7 F

## 2020-10-07 DIAGNOSIS — T83.9XXA FOLEY CATHETER PROBLEM, INITIAL ENCOUNTER (H): ICD-10-CM

## 2020-10-07 DIAGNOSIS — R33.9 URINARY RETENTION WITH INCOMPLETE BLADDER EMPTYING: ICD-10-CM

## 2020-10-07 PROCEDURE — 51798 US URINE CAPACITY MEASURE: CPT | Performed by: EMERGENCY MEDICINE

## 2020-10-07 PROCEDURE — 99282 EMERGENCY DEPT VISIT SF MDM: CPT | Performed by: EMERGENCY MEDICINE

## 2020-10-07 PROCEDURE — 51701 INSERT BLADDER CATHETER: CPT | Performed by: EMERGENCY MEDICINE

## 2020-10-07 PROCEDURE — 99283 EMERGENCY DEPT VISIT LOW MDM: CPT | Mod: 25 | Performed by: EMERGENCY MEDICINE

## 2020-10-07 ASSESSMENT — ENCOUNTER SYMPTOMS
SHORTNESS OF BREATH: 0
FEVER: 0
ABDOMINAL PAIN: 0

## 2020-10-07 ASSESSMENT — MIFFLIN-ST. JEOR: SCORE: 1908.85

## 2020-10-07 NOTE — ED AVS SNAPSHOT
[facility] Emergency Dept  [address] [address]  Phone: [phone]  Fax: [phone]                                    Fco Arroyo   MRN: [id number]    Department: [facility] Emergency Dept   Date of Visit: 10/7/2020           After Visit Summary Signature Page    I have received my discharge instructions, and my questions have been answered. I have discussed any challenges I see with this plan with the nurse or doctor.    ..........................................................................................................................................  Patient/Patient Representative Signature      ..........................................................................................................................................  Patient Representative Print Name and Relationship to Patient    ..................................................               ................................................  Date                                   Time    ..........................................................................................................................................  Reviewed by Signature/Title    ...................................................              ..............................................  Date                                               Time          22EPIC Rev 08/18

## 2020-10-07 NOTE — DISCHARGE INSTRUCTIONS
"Jung catheter was removed.  However, approximately 300 mL remained in the bladder after removal.  There is potential that you may require repeat Jung catheter.  If you are unable to urinate return to the emergency department.    Regarding leg swelling, recommend follow-up in clinic.  Consider heart testing including cardiac echocardiogram.  Stress testing would also be helpful in the future.  Keeping legs elevated would help with the swelling.  Follow-up in clinic, and consider \"water pill.\"  "

## 2020-10-07 NOTE — ED PROVIDER NOTES
History     Chief Complaint   Patient presents with     Catheter Problem     cath no draining. States is passing urine around the cath.     HPI  Fco Arroyo is a 70 year old male who has 2 recent visits to the emergency department for concerns regarding urinary retention and Jung catheter issues.  I reviewed recent ED visits from October 4.  Patient was originally seen during the early morning hours of October 4 after patient had urinary catheter which was placed for urinary retention with 350 cc of urine.  Patient was started on Flomax, and discharged home.  He subsequently re-presented because there was slight migration of the catheter, and did have slight amounts of bleeding that was noted.  This was repositioned, and patient discharged home.  He now presents to the emergency department because patient had urge to urinate, and subsequently had increased amounts of pressure, and urine came out around the Jung catheter tubing at the meatus of the penis.  Therefore, patient presents to the emergency department.  Denies any fever.  No significant abdominal discomfort.  Has had some chronic lower extremity swelling.  Patient does have significant claustrophobia, and has had prior stress testing which has been ordered, however patient has been unable to complete that secondary to himself canceling the appointment.  Patient does not sleep well at night, and typically does not lay down for greater than 15 minutes at a time.    Allergies:  Allergies   Allergen Reactions     Morphine GI Disturbance       Problem List:    Patient Active Problem List    Diagnosis Date Noted     Central retinal vein occlusion of right eye, unspecified complication status 06/05/2020     Priority: Medium     Benign essential hypertension 11/16/2016     Priority: Medium     Type 2 diabetes mellitus without complication, without long-term current use of insulin (H) 11/16/2016     Priority: Medium     Morbid obesity (H) 10/25/2015      Priority: Medium     Advanced directives, counseling/discussion 02/13/2013     Priority: Medium     Discussed advance care planning with patient; information given to patient to review. 2/13/2013          Hyperlipidemia LDL goal <100 11/26/2008     Priority: Medium     with DM2.  LDL a little high - max simvastatin - had changed fm lipitor.       Sensorineural hearing loss, bilateral 11/22/2007     Priority: Medium     gradual - referral to audiology       R PLANTAR FIBROMATOSIS 07/16/2007     Priority: Medium     change footwear,  conservative care.           Past Medical History:    Past Medical History:   Diagnosis Date     DIABETES MELLITUS TYPE II-UNCOMPL 8/17/2005     Essential hypertension, benign      Other and unspecified hyperlipidemia      Other isolated or specific phobias      Other specified causes of urethral stricture      Squamous cell carcinoma        Past Surgical History:    Past Surgical History:   Procedure Laterality Date     SURGICAL HISTORY OF -   1998    open cholecystectomy for gallstones s/p ERCP     SURGICAL HISTORY OF -   1997    cystoscopy     SURGICAL HISTORY OF -   11/1997    Urethral stricture       Family History:    Family History   Problem Relation Age of Onset     Diabetes Mother      Hypertension Mother      Hypertension Father      Heart Disease Brother         MI x2, CABG, 1st MI at 49-51 yo.      Hypertension Brother      C.A.D. Brother      Alzheimer Disease Brother      Diabetes Sister          maybe     Coronary Artery Disease Brother      Heart Disease Brother      Melanoma No family hx of        Social History:  Marital Status:   [2]  Social History     Tobacco Use     Smoking status: Former Smoker     Types: Cigarettes     Smokeless tobacco: Never Used   Substance Use Topics     Alcohol use: Yes     Comment: little     Drug use: No        Medications:         ASPIRIN 81 MG OR TABS       azelastine (ASTELIN) 0.1 % nasal spray       blood glucose (ACCU-CHEK  "SMARTVIEW) test strip       blood glucose monitoring (ACCU-CHEK FASTCLIX) lancets       blood glucose monitoring (ACCU-CHEK SMARTVIEW) test strip       busPIRone (BUSPAR) 5 MG tablet       cinnamon 500 MG CAPS       glipiZIDE (GLIPIZIDE XL) 2.5 MG 24 hr tablet       lisinopril (ZESTRIL) 40 MG tablet       metFORMIN (GLUCOPHAGE) 1000 MG tablet       Misc Natural Products (TOTAL MEMORY & FOCUS FORMULA) TABS       order for DME       Red Yeast Rice Extract 600 MG TABS       rosuvastatin (CRESTOR) 20 MG tablet       sertraline (ZOLOFT) 25 MG tablet       tamsulosin (FLOMAX) 0.4 MG capsule       VITAMIN C 500 MG PO TABS       VITAMIN E          Review of Systems   Constitutional: Negative for fever.   Respiratory: Negative for shortness of breath.    Cardiovascular: Positive for leg swelling. Negative for chest pain.   Gastrointestinal: Negative for abdominal pain.   Genitourinary:        See HPI   All other systems reviewed and are negative.      Physical Exam   BP: (!) 188/93  Pulse: 85  Temp: 97.7  F (36.5  C)  Resp: 22  Height: 172.7 cm (5' 8\")  Weight: 117.9 kg (260 lb)  SpO2: 94 %      Physical Exam  BP (!) 188/93   Pulse 85   Temp 97.7  F (36.5  C) (Oral)   Resp 22   Ht 1.727 m (5' 8\")   Wt 117.9 kg (260 lb)   SpO2 94%   BMI 39.53 kg/m    General: alert and in no acute distress  Head: atraumatic, normocephalic  Abd: nondistended  Musculoskel/Extremities: BLE edema present.  Skin: no rashes, no diaphoresis and skin color normal  Neuro: Patient awake, alert, oriented,    Psychiatric: affect/mood normal,        ED Course        Procedures               Critical Care time:  none               No results found for this or any previous visit (from the past 24 hour(s)).    Medications - No data to display    Assessments & Plan (with Medical Decision Making)  70 year old male, presenting to the emergency department with Jung catheter issues.  Patient did have recent Jung catheter placed on October 4.  I reviewed " visit.  Patient did have repeat visit to the emergency department after he had some complications.  Subsequently discharged home.  Patient now with leakage of urine around the tubing at the meatus of the penis.  Prior to my evaluation of the patient the Jung catheter was removed by nursing staff, and did note that there was blood clot in the tubing.  I discussed with patient options including reinsertion of Jung catheter versus trial at urination.  Patient prefers trial at urination.  Patient given water to drink.  Subsequently urinated small amounts.  He continues with 300 cc of residual volume in the bladder.  I discussed with the patient that this is still a significant amount.  I discussed would recommend repeat Jung catheter or continued waiting to see if he is able to urinate further.  However, patient states previously he was only able to dribble minimal amounts when he had 350 cc urinary retention requiring Jung catheter.  He wishes to discharge home and he is aware of risks of potential need for repeat presentation for Jung catheter if unable to urinate.  Patient is comfortable with this plan.    Patient also had additional questions regarding lower extremity swelling.  Patient has had normal kidney function.  However, I do not see any prior echocardiogram, or other additional heart testing.  Patient has declined prior stress testing.  I recommend that patient follows up in clinic regarding these additional work-ups prior to initiation of potential diuretic medication.  Also has component of immobility and obesity likely contributing as well.     I have reviewed the nursing notes.    I have reviewed the findings, diagnosis, plan and need for follow up with the patient.       New Prescriptions    No medications on file       Final diagnoses:   Urinary retention with incomplete bladder emptying   Jung catheter problem, initial encounter (H)       10/7/2020   [facility] EMERGENCY DEPT      Nathanael Rodgers MD  10/07/20 1221

## 2020-10-07 NOTE — ED TRIAGE NOTES
Pt states he had a cath placed on Sunday due to urinary retention. Pt states the cath is to stay in for 7-10 days. Pt states the cath is now draining from around the catheter. Pt denies pain and passing blood.

## 2020-10-12 DIAGNOSIS — F41.1 GENERALIZED ANXIETY DISORDER: ICD-10-CM

## 2020-10-12 DIAGNOSIS — H34.8112 CENTRAL RETINAL VEIN OCCLUSION OF RIGHT EYE, UNSPECIFIED COMPLICATION STATUS (H): ICD-10-CM

## 2020-10-12 RX ORDER — ROSUVASTATIN CALCIUM 20 MG/1
TABLET, COATED ORAL
Qty: 90 TABLET | Refills: 0 | OUTPATIENT
Start: 2020-10-12

## 2020-10-12 NOTE — TELEPHONE ENCOUNTER
"Requested Prescriptions   Pending Prescriptions Disp Refills     sertraline (ZOLOFT) 25 MG tablet [Pharmacy Med Name: Sertraline HCl 25 MG Oral Tablet] 30 tablet 0     Sig: Take 1 tablet by mouth once daily       SSRIs Protocol Passed - 10/12/2020  9:44 AM        Passed - Recent (12 mo) or future (30 days) visit within the authorizing provider's specialty     Patient has had an office visit with the authorizing provider or a provider within the authorizing providers department within the previous 12 mos or has a future within next 30 days. See \"Patient Info\" tab in inbasket, or \"Choose Columns\" in Meds & Orders section of the refill encounter.              Passed - Medication is active on med list        Passed - Patient is age 18 or older             "

## 2020-10-13 RX ORDER — SERTRALINE HYDROCHLORIDE 25 MG/1
TABLET, FILM COATED ORAL
Qty: 30 TABLET | Refills: 0 | Status: SHIPPED | OUTPATIENT
Start: 2020-10-13 | End: 2020-11-02

## 2020-10-14 ENCOUNTER — VIRTUAL VISIT (OUTPATIENT)
Dept: UROLOGY | Facility: CLINIC | Age: 70
End: 2020-10-14
Attending: FAMILY MEDICINE
Payer: COMMERCIAL

## 2020-10-14 DIAGNOSIS — R33.9 URINARY RETENTION: ICD-10-CM

## 2020-10-14 PROCEDURE — 99201 PR OFFICE/OUTPT VISIT, NEW, LEVEL I: CPT | Mod: 95 | Performed by: UROLOGY

## 2020-10-14 RX ORDER — TAMSULOSIN HYDROCHLORIDE 0.4 MG/1
0.4 CAPSULE ORAL DAILY
Qty: 90 CAPSULE | Refills: 3 | Status: SHIPPED | OUTPATIENT
Start: 2020-10-14

## 2020-10-14 NOTE — PROGRESS NOTES
Telephone visit    70 year-old male with a history of voiding difficulty who was seen in the emergency room and  found to have an elevated postvoid residual of approximately 350 cc.    Indwelling Jung catheter was used to establish bladder drainage.  He had some difficulties with the Jung catheter and was seen again in the emergency room several days later.  Catheter was subsequently removed and he reports that he is now voiding without difficulty.    The emergency room started him on tamsulosin 0.4 mg daily which seems to be improving his urination to his satisfaction.    He reports that he has never previously been on any medication to improve urination.    Impression: Recent episode of urinary retention managed by a temporary bladder catheter in the initiation of tamsulosin medicinal therapy.    Plan: I will ask him to stop by our clinic for a postvoid residual bladder scan.    I have also revised his prescription of the tamsulosin for a one-year supply.    Total time 10 minutes

## 2020-10-15 ENCOUNTER — ALLIED HEALTH/NURSE VISIT (OUTPATIENT)
Dept: UROLOGY | Facility: CLINIC | Age: 70
End: 2020-10-15
Payer: COMMERCIAL

## 2020-10-15 DIAGNOSIS — R33.9 URINARY RETENTION: Primary | ICD-10-CM

## 2020-10-15 PROCEDURE — 51798 US URINE CAPACITY MEASURE: CPT

## 2020-10-15 NOTE — PROGRESS NOTES
Pt here for PVR.  Pt emptied bladder.  RN scanned Bladder for 15ml in bladder.  Valentine MAURER   Specialty Clinic RN

## 2020-10-21 ENCOUNTER — OFFICE VISIT (OUTPATIENT)
Dept: FAMILY MEDICINE | Facility: CLINIC | Age: 70
End: 2020-10-21
Payer: COMMERCIAL

## 2020-10-21 VITALS
BODY MASS INDEX: 40.34 KG/M2 | HEART RATE: 100 BPM | HEIGHT: 67 IN | TEMPERATURE: 98.8 F | DIASTOLIC BLOOD PRESSURE: 58 MMHG | OXYGEN SATURATION: 96 % | WEIGHT: 257 LBS | SYSTOLIC BLOOD PRESSURE: 126 MMHG | RESPIRATION RATE: 18 BRPM

## 2020-10-21 DIAGNOSIS — E11.9 TYPE 2 DIABETES MELLITUS WITHOUT COMPLICATION, WITHOUT LONG-TERM CURRENT USE OF INSULIN (H): ICD-10-CM

## 2020-10-21 DIAGNOSIS — F41.1 GENERALIZED ANXIETY DISORDER: Primary | ICD-10-CM

## 2020-10-21 DIAGNOSIS — E78.5 HYPERLIPIDEMIA LDL GOAL <100: ICD-10-CM

## 2020-10-21 DIAGNOSIS — R06.00 DYSPNEA, UNSPECIFIED TYPE: ICD-10-CM

## 2020-10-21 DIAGNOSIS — R09.81 NASAL CONGESTION: ICD-10-CM

## 2020-10-21 DIAGNOSIS — R60.0 EDEMA LEG: ICD-10-CM

## 2020-10-21 LAB
ALBUMIN SERPL-MCNC: 3.6 G/DL (ref 3.4–5)
ALP SERPL-CCNC: 71 U/L (ref 40–150)
ALT SERPL W P-5'-P-CCNC: 27 U/L (ref 0–70)
ANION GAP SERPL CALCULATED.3IONS-SCNC: 5 MMOL/L (ref 3–14)
AST SERPL W P-5'-P-CCNC: 17 U/L (ref 0–45)
BILIRUB SERPL-MCNC: 0.4 MG/DL (ref 0.2–1.3)
BUN SERPL-MCNC: 18 MG/DL (ref 7–30)
CALCIUM SERPL-MCNC: 8.6 MG/DL (ref 8.5–10.1)
CHLORIDE SERPL-SCNC: 104 MMOL/L (ref 94–109)
CHOLEST SERPL-MCNC: 128 MG/DL
CO2 SERPL-SCNC: 28 MMOL/L (ref 20–32)
CREAT SERPL-MCNC: 1.02 MG/DL (ref 0.66–1.25)
GFR SERPL CREATININE-BSD FRML MDRD: 73 ML/MIN/{1.73_M2}
GLUCOSE SERPL-MCNC: 205 MG/DL (ref 70–99)
HBA1C MFR BLD: 6.6 % (ref 0–5.6)
HDLC SERPL-MCNC: 42 MG/DL
LDLC SERPL CALC-MCNC: 36 MG/DL
NONHDLC SERPL-MCNC: 86 MG/DL
POTASSIUM SERPL-SCNC: 3.8 MMOL/L (ref 3.4–5.3)
PROT SERPL-MCNC: 6.9 G/DL (ref 6.8–8.8)
SODIUM SERPL-SCNC: 137 MMOL/L (ref 133–144)
TRIGL SERPL-MCNC: 251 MG/DL
TSH SERPL DL<=0.005 MIU/L-ACNC: 0.8 MU/L (ref 0.4–4)

## 2020-10-21 PROCEDURE — 80061 LIPID PANEL: CPT | Performed by: FAMILY MEDICINE

## 2020-10-21 PROCEDURE — 36415 COLL VENOUS BLD VENIPUNCTURE: CPT | Performed by: FAMILY MEDICINE

## 2020-10-21 PROCEDURE — 99214 OFFICE O/P EST MOD 30 MIN: CPT | Performed by: FAMILY MEDICINE

## 2020-10-21 PROCEDURE — 83036 HEMOGLOBIN GLYCOSYLATED A1C: CPT | Performed by: FAMILY MEDICINE

## 2020-10-21 PROCEDURE — 80053 COMPREHEN METABOLIC PANEL: CPT | Performed by: FAMILY MEDICINE

## 2020-10-21 PROCEDURE — 84443 ASSAY THYROID STIM HORMONE: CPT | Performed by: FAMILY MEDICINE

## 2020-10-21 RX ORDER — BUSPIRONE HYDROCHLORIDE 5 MG/1
TABLET ORAL
Qty: 30 TABLET | Refills: 3 | Status: SHIPPED | OUTPATIENT
Start: 2020-10-21

## 2020-10-21 RX ORDER — SERTRALINE HYDROCHLORIDE 25 MG/1
25 TABLET, FILM COATED ORAL DAILY
Qty: 90 TABLET | Refills: 3 | Status: CANCELLED | OUTPATIENT
Start: 2020-10-21

## 2020-10-21 ASSESSMENT — ANXIETY QUESTIONNAIRES
2. NOT BEING ABLE TO STOP OR CONTROL WORRYING: NOT AT ALL
7. FEELING AFRAID AS IF SOMETHING AWFUL MIGHT HAPPEN: NOT AT ALL
5. BEING SO RESTLESS THAT IT IS HARD TO SIT STILL: NOT AT ALL
6. BECOMING EASILY ANNOYED OR IRRITABLE: NOT AT ALL
1. FEELING NERVOUS, ANXIOUS, OR ON EDGE: MORE THAN HALF THE DAYS
GAD7 TOTAL SCORE: 2
3. WORRYING TOO MUCH ABOUT DIFFERENT THINGS: NOT AT ALL

## 2020-10-21 ASSESSMENT — PAIN SCALES - GENERAL: PAINLEVEL: NO PAIN (0)

## 2020-10-21 ASSESSMENT — PATIENT HEALTH QUESTIONNAIRE - PHQ9
SUM OF ALL RESPONSES TO PHQ QUESTIONS 1-9: 3
5. POOR APPETITE OR OVEREATING: NOT AT ALL

## 2020-10-21 ASSESSMENT — MIFFLIN-ST. JEOR: SCORE: 1879.37

## 2020-10-21 NOTE — LETTER
October 22, 2020      Fco Arroyo  [address] [facility] [address]        Dear ,    We are writing to inform you of your test results.    Your test results fall within the expected range(s) or remain unchanged from previous results.  Please continue with current treatment plan.    Resulted Orders   TSH with free T4 reflex   Result Value Ref Range    TSH 0.80 0.40 - 4.00 mU/L   Comprehensive metabolic panel   Result Value Ref Range    Sodium 137 133 - 144 mmol/L    Potassium 3.8 3.4 - 5.3 mmol/L    Chloride 104 94 - 109 mmol/L    Carbon Dioxide 28 20 - 32 mmol/L    Anion Gap 5 3 - 14 mmol/L    Glucose 205 (H) 70 - 99 mg/dL    Urea Nitrogen 18 7 - 30 mg/dL    Creatinine 1.02 0.66 - 1.25 mg/dL    GFR Estimate 73 >60 mL/min/[1.73_m2]      Comment:      Non  GFR Calc  Starting 12/18/2018, serum creatinine based estimated GFR (eGFR) will be   calculated using the Chronic Kidney Disease Epidemiology Collaboration   (CKD-EPI) equation.      GFR Estimate If Black 85 >60 mL/min/[1.73_m2]      Comment:       GFR Calc  Starting 12/18/2018, serum creatinine based estimated GFR (eGFR) will be   calculated using the Chronic Kidney Disease Epidemiology Collaboration   (CKD-EPI) equation.      Calcium 8.6 8.5 - 10.1 mg/dL    Bilirubin Total 0.4 0.2 - 1.3 mg/dL    Albumin 3.6 3.4 - 5.0 g/dL    Protein Total 6.9 6.8 - 8.8 g/dL    Alkaline Phosphatase 71 40 - 150 U/L    ALT 27 0 - 70 U/L    AST 17 0 - 45 U/L   Hemoglobin A1c   Result Value Ref Range    Hemoglobin A1C 6.6 (H) 0 - 5.6 %      Comment:      Normal <5.7% Prediabetes 5.7-6.4%  Diabetes 6.5% or higher - adopted from ADA   consensus guidelines.     Lipid panel reflex to direct LDL Non-fasting   Result Value Ref Range    Cholesterol 128 <200 mg/dL    Triglycerides 251 (H) <150 mg/dL      Comment:      Borderline high:  150-199 mg/dl  High:             200-499 mg/dl  Very high:       >499 mg/dl      HDL Cholesterol 42 >39  mg/dL    LDL Cholesterol Calculated 36 <100 mg/dL      Comment:      Desirable:       <100 mg/dl    Non HDL Cholesterol 86 <130 mg/dL   Labs all look good.     Thyroid is normal.  Cholesterol is well controlled.     Kidney and liver function are normal.       HgbA1c is good at 6.6%.     If you have any questions or concerns, please call the clinic at the number listed above.       Sincerely,        Sandy Clay MD

## 2020-10-21 NOTE — PATIENT INSTRUCTIONS
Increase Sertraline to  50 mg daily - recheck in 1 month on dose, phone visit would be okay for this.     Echocardiogram [phone] - in [place]    We'll notify you of blood work when we have results available.     See ENT (Dr. Hall in [place]) for the congestion.      Our Clinic hours are:  Mondays    7:20 am - 7 pm  Tues -  Fri  7:20 am - 5 pm    Clinic Phone: [phone]    The clinic lab opens at 7:30 am Mon - Fri and appointments are required.    [facility]  Ph. [phone]  Monday  8 am - 7pm  Tues - Fri 8 am - 5:30 pm

## 2020-10-21 NOTE — PROGRESS NOTES
"Subjective     Fco Arroyo is a 70 year old male who presents to clinic today for the following health issues:    HPI       Chief Complaint   Patient presents with     Edema     Patient has bilateral ankle swelling for the last couple months that he would like to discuss starting a medication.  Was told in ER to get echo.    We discussed the w/u for this - checking kidney/liver/TSH, echo, etc.       Anxiety     Patient is wondering if medication could be triggering his anxiety.    Takes buspar \"as needed\"    Sertraline - has been on since May but has not had dose increase.   We discussed that he may not be on a high enough dose.      Eye Problem     Patient is still having problem with right eye floaters. Patient has seen eye MD for this.   Symptoms are a little better, but come and go.     Eye doctor did think swelling was improved.   We changed to rosuvastatin last time I saw him.      Breathing Problem     Patient feels like he is not able to breathe out of his nose and when laying down he will get anxious from having trouble breathing. Patient states \"it's a constant stuffed nose.\" Patient has been using vicks which helps. Patient has tried nasal spray with no relief.     He tried Astelin nasal spray without success.  He takes Airborne daily.    He is frustrated by the nasal congestion. Brother had to have surgery to help with his obstruction.      Flu Shot     declined          Anxiety Follow-Up    How are you doing with your anxiety since your last visit? Worsened anxious for not being able to breath out of his nose    Are you having other symptoms that might be associated with anxiety? Yes:  more anxious feeling    Have you had a significant life event? No     Are you feeling depressed? No    Do you have any concerns with your use of alcohol or other drugs? No    Social History     Tobacco Use     Smoking status: Former Smoker     Types: Cigarettes     Smokeless tobacco: Never Used   Substance Use " Topics     Alcohol use: Yes     Comment: little     Drug use: No     FLOWER-7 SCORE 2/25/2020 5/27/2020 10/21/2020   Total Score 4 3 2     PHQ 2/25/2020 5/27/2020 10/21/2020   PHQ-9 Total Score 5 2 3   Q9: Thoughts of better off dead/self-harm past 2 weeks Not at all Not at all Not at all     Last PHQ-9 10/21/2020   1.  Little interest or pleasure in doing things 0   2.  Feeling down, depressed, or hopeless 0   3.  Trouble falling or staying asleep, or sleeping too much 3   4.  Feeling tired or having little energy 0   5.  Poor appetite or overeating 0   6.  Feeling bad about yourself 0   7.  Trouble concentrating 0   8.  Moving slowly or restless 0   Q9: Thoughts of better off dead/self-harm past 2 weeks 0   PHQ-9 Total Score 3   Difficulty at work, home, or with people Not difficult at all     FLOWER-7  10/21/2020   1. Feeling nervous, anxious, or on edge 2   2. Not being able to stop or control worrying 0   3. Worrying too much about different things 0   4. Trouble relaxing 0   5. Being so restless that it is hard to sit still 0   6. Becoming easily annoyed or irritable 0   7. Feeling afraid, as if something awful might happen 0   FLOWER-7 Total Score 2   If you checked any problems, how difficult have they made it for you to do your work, take care of things at home, or get along with other people? -         How many servings of fruits and vegetables do you eat daily?  0-1    On average, how many sweetened beverages do you drink each day (Examples: soda, juice, sweet tea, etc.  Do NOT count diet or artificially sweetened beverages)?   1    How many days per week do you exercise enough to make your heart beat faster? 3 or less    How many minutes a day do you exercise enough to make your heart beat faster? 9 or less    How many days per week do you miss taking your medication? 0        Review of Systems   Constitutional, HEENT, cardiovascular, pulmonary, gi and gu systems are negative, except as otherwise noted.     "  Objective    /58   Pulse 100   Temp 98.8  F (37.1  C) (Tympanic)   Resp 18   Ht 1.702 m (5' 7\")   Wt 116.6 kg (257 lb)   SpO2 96%   BMI 40.25 kg/m    Body mass index is 40.25 kg/m .  Physical Exam   GENERAL: healthy, alert and no distress  HENT: nasal mucosa edematous , oropharynx clear and oral mucous membranes moist  NECK: no adenopathy, no asymmetry, masses, or scars and thyroid normal to palpation  RESP: lungs clear to auscultation - no rales, rhonchi or wheezes  CV: regular rate and rhythm, normal S1 S2, no S3 or S4, no murmur, click or rub, no peripheral edema and peripheral pulses strong  ABDOMEN: soft, nontender, no hepatosplenomegaly, no masses and bowel sounds normal  MS: no gross musculoskeletal defects noted, no edema    Results for orders placed or performed in visit on 10/21/20 (from the past 24 hour(s))   Hemoglobin A1c   Result Value Ref Range    Hemoglobin A1C 6.6 (H) 0 - 5.6 %           Assessment & Plan     Generalized anxiety disorder   increase sertraline to 50 mg  Follow up visit - phone call - in 1 month  - busPIRone (BUSPAR) 5 MG tablet; TAKE 1 TABLET BY MOUTH EVERY 8 HOURS AS NEEDED FOR ANXIETY  - sertraline (ZOLOFT) 50 MG tablet; Take 1 tablet (50 mg) by mouth daily    Nasal congestion   r/o mechanical obstruction/polyps, etc.  At this time has not yet been on inhaled steroid.   - OTOLARYNGOLOGY REFERRAL    Edema leg   as above, look at kidney/liver/thyroid, check echocardiogram.   - TSH with free T4 reflex  - Comprehensive metabolic panel  - Echocardiogram Complete; Future    Dyspnea, unspecified type     - TSH with free T4 reflex  - Comprehensive metabolic panel  - Echocardiogram Complete; Future    Type 2 diabetes mellitus without complication, without long-term current use of insulin (H)     - Hemoglobin A1c    Hyperlipidemia LDL goal <100   continue Rosuvastatin  - Lipid panel reflex to direct LDL Non-fasting     BMI:   Estimated body mass index is 40.25 kg/m  as " "calculated from the following:    Height as of this encounter: 1.702 m (5' 7\").    Weight as of this encounter: 116.6 kg (257 lb).                No follow-ups on file.    Sandy Clay MD  [facility]    "

## 2020-10-22 ASSESSMENT — ANXIETY QUESTIONNAIRES: GAD7 TOTAL SCORE: 2

## 2020-10-22 NOTE — RESULT ENCOUNTER NOTE
Labs all look good.     Thyroid is normal.  Cholesterol is well controlled.     Kidney and liver function are normal.       HgbA1c is good at 6.6%.     Sandy Clay M.D.

## 2020-10-26 PROBLEM — F41.1 GENERALIZED ANXIETY DISORDER: Status: ACTIVE | Noted: 2020-10-26

## 2020-10-28 NOTE — PROGRESS NOTES
Chief Complaint   Patient presents with     Nasal Congestion     problem noted for about 2 months- trouble breathing at night- referred by Dr. Clay     History of Present Illness   Fco Arroyo is a 70 year old male who presents today for evaluation.  I am seeing this patient in consultation for nasal congestion at the request of the provider Dr. Clay.  The patient describes symptoms of bilateral nasal obstruction seems to alternate sides and worse while lying down for the past 4-6 months.  No epistaxis. The patient notes no history of allergies. The patient has not been tested for allergies in the past. Treatments have included nasal antihistamines without benefit.  No significant history of nasal trauma. No prior history of nose or sinus surgery.  Patient is a former smoker. Patient denies any rhinorrhea, postnasal drainage, taste or smell disturbance, face pain/pressure/fullness.  He does have alternating congestion.    Past Medical History  Patient Active Problem List   Diagnosis     R PLANTAR FIBROMATOSIS     Sensorineural hearing loss, bilateral     Hyperlipidemia LDL goal <100     Advanced directives, counseling/discussion     Morbid obesity (H)     Benign essential hypertension     Type 2 diabetes mellitus without complication, without long-term current use of insulin (H)     Central retinal vein occlusion of right eye, unspecified complication status     Generalized anxiety disorder     Current Medications     Current Outpatient Medications:      ASPIRIN 81 MG OR TABS, 1 TABLET DAILY, Disp: , Rfl:      blood glucose (ACCU-CHEK SMARTVIEW) test strip, Use to test blood sugar 1 times daily or as directed., Disp: 50 strip, Rfl: 12     blood glucose monitoring (ACCU-CHEK FASTCLIX) lancets, 1 each daily Use to test blood sugar 1 times daily or as directed., Disp: 102 Box, Rfl: 12     blood glucose monitoring (ACCU-CHEK SMARTVIEW) test strip, Use to test blood sugar 2 times daily or as directed.,  Disp: 200 each, Rfl: 3     busPIRone (BUSPAR) 5 MG tablet, TAKE 1 TABLET BY MOUTH EVERY 8 HOURS AS NEEDED FOR ANXIETY, Disp: 30 tablet, Rfl: 3     cinnamon 500 MG CAPS, Take  by mouth., Disp: , Rfl:      fluticasone (FLONASE) 50 MCG/ACT nasal spray, Spray 2 sprays into both nostrils daily, Disp: 47.4 mL, Rfl: 3     glipiZIDE (GLIPIZIDE XL) 2.5 MG 24 hr tablet, Take 1 tablet (2.5 mg) by mouth daily, Disp: 90 tablet, Rfl: 3     lisinopril (ZESTRIL) 40 MG tablet, Take 1 tablet (40 mg) by mouth daily, Disp: 90 tablet, Rfl: 3     metFORMIN (GLUCOPHAGE) 1000 MG tablet, Take 1 tablet (1,000 mg) by mouth 2 times daily (with meals), Disp: 180 tablet, Rfl: 3     Misc Natural Products (TOTAL MEMORY & FOCUS FORMULA) TABS, Take 1 tablet by mouth daily, Disp: , Rfl:      Red Yeast Rice Extract 600 MG TABS, Taking 1200 mg daily., Disp:  , Rfl:      rosuvastatin (CRESTOR) 20 MG tablet, Take 1 tablet (20 mg) by mouth daily, Disp: 90 tablet, Rfl: 1     sertraline (ZOLOFT) 50 MG tablet, Take 1 tablet (50 mg) by mouth daily, Disp: 90 tablet, Rfl: 0     tamsulosin (FLOMAX) 0.4 MG capsule, Take 1 capsule (0.4 mg) by mouth daily, Disp: 90 capsule, Rfl: 3     VITAMIN C 500 MG PO TABS, 1 TABLET  DAILY, Disp: , Rfl:      VITAMIN E, daily, Disp: , Rfl:     Allergies  Allergies   Allergen Reactions     Morphine GI Disturbance       Social History   Social History     Socioeconomic History     Marital status:      Spouse name: Not on file     Number of children: Not on file     Years of education: Not on file     Highest education level: Not on file   Occupational History     Not on file   Social Needs     Financial resource strain: Not on file     Food insecurity     Worry: Not on file     Inability: Not on file     Transportation needs     Medical: Not on file     Non-medical: Not on file   Tobacco Use     Smoking status: Former Smoker     Types: Cigarettes     Smokeless tobacco: Never Used   Substance and Sexual Activity     Alcohol  use: Yes     Comment: little     Drug use: No     Sexual activity: Not Currently   Lifestyle     Physical activity     Days per week: Not on file     Minutes per session: Not on file     Stress: Not on file   Relationships     Social connections     Talks on phone: Not on file     Gets together: Not on file     Attends Buddhism service: Not on file     Active member of club or organization: Not on file     Attends meetings of clubs or organizations: Not on file     Relationship status: Not on file     Intimate partner violence     Fear of current or ex partner: Not on file     Emotionally abused: Not on file     Physically abused: Not on file     Forced sexual activity: Not on file   Other Topics Concern     Parent/sibling w/ CABG, MI or angioplasty before 65F 55M? Yes   Social History Narrative     Not on file       Family History  Family History   Problem Relation Age of Onset     Diabetes Mother      Hypertension Mother      Hypertension Father      Heart Disease Brother         MI x2, CABG, 1st MI at 49-51 yo.      Hypertension Brother      C.A.D. Brother      Alzheimer Disease Brother      Diabetes Sister          maybe     Coronary Artery Disease Brother      Heart Disease Brother      Melanoma No family hx of        Review of Systems  As per HPI and PMHx, otherwise 10+ comprehensive system review is negative.    Physical Exam  BP (!) 143/72 (BP Location: Left arm, Patient Position: Chair, Cuff Size: Adult Large)   Pulse 88   Temp 98.4  F (36.9  C) (Tympanic)   Wt 116.6 kg (257 lb)   BMI 40.25 kg/m    GENERAL: Patient is a pleasant, cooperative 70 year old male in no acute distress.  HEAD: Normocephalic, atraumatic.  Hair and scalp are normal.  EYES: Pupils are equal, round, reactive to light and accommodation.  Extraocular movements are intact.  The sclera nonicteric without injection.  The extraocular structures are normal.  EARS: Normal shape and symmetry.  No tenderness when palpating the mastoid or  tragal areas bilaterally.  Hearing aids in place.    NOSE: Nares are patent.  Nasal mucosa is boggy and inflamed with sticky, inflammatory mucus.  The patient's left nasal septal deviation.  The inferior turbinates are markedly hypertrophied right greater than left.  No nasal cavity masses, polyps, or mucopurulence on anterior rhinoscopy.  NEUROLOGIC: Cranial nerves II through XII are grossly intact.  Voice is strong.  Patient is House-Brackmann I/VI bilaterally.  CARDIOVASCULAR: Extremities are warm and well-perfused.  No significant peripheral edema.  RESPIRATORY: Patient has nonlabored breathing without cough, wheeze, stridor.  PSYCHIATRIC: Patient is alert and oriented.  Mood and affect appear normal.  SKIN: Warm and dry.  No scalp, face, or neck lesions noted.    Procedure: Flexible Nasal Endoscopy  Indication: Chronic nasal congestion    To best visualize the sinonasal anatomy and due to the chief complaint and HPI, I proceeded with flexible fiberoptic nasal endoscopy.  The bilateral nasal cavities were anesthetized and decongested with a mixture of lidocaine and neosynephrine.  The bilateral nasal cavities were then examined using flexible fiberoptic nasal endoscope.  The right nasal cavity was without masses, polyps, or mucopurulence.  The right middle turbinate and middle meatus was normal in appearance.  The sphenoethmoid recess was clear.  The left nasal cavity was without masses, polyps, or mucopurulence.  The left middle turbinate and middle meatus was normal in appearance.  The sphenoethmoid recess was clear.  The sinonasal mucosa appeared boggy and inflamed with sticky, inflammatory mucus.  The nasal septum deviates to the left with spur.  The patient has severe/marked inferior turbinate hypertrophy right greater than left.  The nasopharynx had a normal appearance with normal Eustachian tube openings and fossa of Rosenmuller bilaterally.  Minimal adenoid tissue.  The scope was removed.  The patient  tolerated the procedure well.    Assessment and Plan     ICD-10-CM    1. Nasal congestion  R09.81 NASAL ENDOSCOPY, DIAGNOSTIC     fluticasone (FLONASE) 50 MCG/ACT nasal spray   2. Chronic rhinitis  J31.0 NASAL ENDOSCOPY, DIAGNOSTIC     fluticasone (FLONASE) 50 MCG/ACT nasal spray   3. Deviated nasal septum  J34.2 NASAL ENDOSCOPY, DIAGNOSTIC     fluticasone (FLONASE) 50 MCG/ACT nasal spray   4. Hypertrophy of both inferior nasal turbinates  J34.3 NASAL ENDOSCOPY, DIAGNOSTIC     fluticasone (FLONASE) 50 MCG/ACT nasal spray     It was my pleasure seeing Fco Arroyo today in clinic.  The patient presents with nasal obstruction.  His physical exam is consistent with chronic rhinitis.  He does have a mild leftward nasal septal deviation with fairly significant turbinate hypertrophy.  He failed a topical antihistamine nasal spray.  We discussed a trial of a topical nasal steroid in combination with nasal saline irrigation.  We reviewed nasal saline irrigation technique.  Discussed proper nasal steroid application technique.  The patient may benefit from allergy testing. If medical management fails the next steps may include nasal surgery in the form of septoplasty with inferior turbinate reduction and/or out-fracture.    Fco to follow up with Primary Care provider regarding elevated blood pressure.    Erich Hall MD  Department of Otolarygology-Head and Neck Surgery  [facility]

## 2020-10-30 ENCOUNTER — HOSPITAL ENCOUNTER (OUTPATIENT)
Dept: CARDIOLOGY | Facility: CLINIC | Age: 70
Discharge: HOME | End: 2020-10-30
Attending: FAMILY MEDICINE | Admitting: FAMILY MEDICINE
Payer: COMMERCIAL

## 2020-10-30 DIAGNOSIS — R06.00 DYSPNEA, UNSPECIFIED TYPE: ICD-10-CM

## 2020-10-30 DIAGNOSIS — R60.0 EDEMA LEG: ICD-10-CM

## 2020-10-30 PROCEDURE — 93306 TTE W/DOPPLER COMPLETE: CPT | Mod: 26 | Performed by: INTERNAL MEDICINE

## 2020-10-30 PROCEDURE — 93306 TTE W/DOPPLER COMPLETE: CPT

## 2020-11-02 ENCOUNTER — OFFICE VISIT (OUTPATIENT)
Dept: OTOLARYNGOLOGY | Facility: CLINIC | Age: 70
End: 2020-11-02
Attending: FAMILY MEDICINE
Payer: COMMERCIAL

## 2020-11-02 VITALS
TEMPERATURE: 98.4 F | HEART RATE: 88 BPM | BODY MASS INDEX: 40.25 KG/M2 | DIASTOLIC BLOOD PRESSURE: 72 MMHG | SYSTOLIC BLOOD PRESSURE: 143 MMHG | WEIGHT: 257 LBS

## 2020-11-02 DIAGNOSIS — J31.0 CHRONIC RHINITIS: ICD-10-CM

## 2020-11-02 DIAGNOSIS — R09.81 NASAL CONGESTION: Primary | ICD-10-CM

## 2020-11-02 DIAGNOSIS — J34.3 HYPERTROPHY OF BOTH INFERIOR NASAL TURBINATES: ICD-10-CM

## 2020-11-02 DIAGNOSIS — J34.2 DEVIATED NASAL SEPTUM: ICD-10-CM

## 2020-11-02 PROCEDURE — 31231 NASAL ENDOSCOPY DX: CPT | Performed by: OTOLARYNGOLOGY

## 2020-11-02 PROCEDURE — 99214 OFFICE O/P EST MOD 30 MIN: CPT | Mod: 25 | Performed by: OTOLARYNGOLOGY

## 2020-11-02 RX ORDER — FLUTICASONE PROPIONATE 50 MCG
2 SPRAY, SUSPENSION (ML) NASAL DAILY
Qty: 47.4 ML | Refills: 3 | Status: SHIPPED | OUTPATIENT
Start: 2020-11-02

## 2020-11-02 SDOH — HEALTH STABILITY: MENTAL HEALTH: HOW MANY STANDARD DRINKS CONTAINING ALCOHOL DO YOU HAVE ON A TYPICAL DAY?: NOT ASKED

## 2020-11-02 SDOH — HEALTH STABILITY: MENTAL HEALTH: HOW OFTEN DO YOU HAVE A DRINK CONTAINING ALCOHOL?: NOT ASKED

## 2020-11-02 SDOH — HEALTH STABILITY: MENTAL HEALTH: HOW OFTEN DO YOU HAVE 6 OR MORE DRINKS ON ONE OCCASION?: NOT ASKED

## 2020-11-02 ASSESSMENT — PAIN SCALES - GENERAL: PAINLEVEL: NO PAIN (0)

## 2020-11-02 NOTE — NURSING NOTE
This laryngoscopy scope was used on this patient.    Flexible    Olympus Flexible #[id number] Adult

## 2020-11-02 NOTE — LETTER
11/2/2020         RE: Fco Arroyo  [address]  [facility] [address]        Dear Colleague,    Thank you for referring your patient, Fco Arroyo, to the [facility]. Please see a copy of my visit note below.    Chief Complaint   Patient presents with     Nasal Congestion     problem noted for about 2 months- trouble breathing at night- referred by Dr. Clay     History of Present Illness   Fco Arroyo is a 70 year old male who presents today for evaluation.  I am seeing this patient in consultation for nasal congestion at the request of the provider Dr. Clay.  The patient describes symptoms of bilateral nasal obstruction seems to alternate sides and worse while lying down for the past 4-6 months.  No epistaxis. The patient notes no history of allergies. The patient has not been tested for allergies in the past. Treatments have included nasal antihistamines without benefit.  No significant history of nasal trauma. No prior history of nose or sinus surgery.  Patient is a former smoker. Patient denies any rhinorrhea, postnasal drainage, taste or smell disturbance, face pain/pressure/fullness.  He does have alternating congestion.    Past Medical History  Patient Active Problem List   Diagnosis     R PLANTAR FIBROMATOSIS     Sensorineural hearing loss, bilateral     Hyperlipidemia LDL goal <100     Advanced directives, counseling/discussion     Morbid obesity (H)     Benign essential hypertension     Type 2 diabetes mellitus without complication, without long-term current use of insulin (H)     Central retinal vein occlusion of right eye, unspecified complication status     Generalized anxiety disorder     Current Medications     Current Outpatient Medications:      ASPIRIN 81 MG OR TABS, 1 TABLET DAILY, Disp: , Rfl:      blood glucose (ACCU-CHEK SMARTVIEW) test strip, Use to test blood sugar 1 times daily or as directed., Disp: 50 strip, Rfl: 12     blood glucose  monitoring (ACCU-CHEK FASTCLIX) lancets, 1 each daily Use to test blood sugar 1 times daily or as directed., Disp: 102 Box, Rfl: 12     blood glucose monitoring (ACCU-CHEK SMARTVIEW) test strip, Use to test blood sugar 2 times daily or as directed., Disp: 200 each, Rfl: 3     busPIRone (BUSPAR) 5 MG tablet, TAKE 1 TABLET BY MOUTH EVERY 8 HOURS AS NEEDED FOR ANXIETY, Disp: 30 tablet, Rfl: 3     cinnamon 500 MG CAPS, Take  by mouth., Disp: , Rfl:      fluticasone (FLONASE) 50 MCG/ACT nasal spray, Spray 2 sprays into both nostrils daily, Disp: 47.4 mL, Rfl: 3     glipiZIDE (GLIPIZIDE XL) 2.5 MG 24 hr tablet, Take 1 tablet (2.5 mg) by mouth daily, Disp: 90 tablet, Rfl: 3     lisinopril (ZESTRIL) 40 MG tablet, Take 1 tablet (40 mg) by mouth daily, Disp: 90 tablet, Rfl: 3     metFORMIN (GLUCOPHAGE) 1000 MG tablet, Take 1 tablet (1,000 mg) by mouth 2 times daily (with meals), Disp: 180 tablet, Rfl: 3     Misc Natural Products (TOTAL MEMORY & FOCUS FORMULA) TABS, Take 1 tablet by mouth daily, Disp: , Rfl:      Red Yeast Rice Extract 600 MG TABS, Taking 1200 mg daily., Disp:  , Rfl:      rosuvastatin (CRESTOR) 20 MG tablet, Take 1 tablet (20 mg) by mouth daily, Disp: 90 tablet, Rfl: 1     sertraline (ZOLOFT) 50 MG tablet, Take 1 tablet (50 mg) by mouth daily, Disp: 90 tablet, Rfl: 0     tamsulosin (FLOMAX) 0.4 MG capsule, Take 1 capsule (0.4 mg) by mouth daily, Disp: 90 capsule, Rfl: 3     VITAMIN C 500 MG PO TABS, 1 TABLET  DAILY, Disp: , Rfl:      VITAMIN E, daily, Disp: , Rfl:     Allergies  Allergies   Allergen Reactions     Morphine GI Disturbance       Social History   Social History     Socioeconomic History     Marital status:      Spouse name: Not on file     Number of children: Not on file     Years of education: Not on file     Highest education level: Not on file   Occupational History     Not on file   Social Needs     Financial resource strain: Not on file     Food insecurity     Worry: Not on file      Inability: Not on file     Transportation needs     Medical: Not on file     Non-medical: Not on file   Tobacco Use     Smoking status: Former Smoker     Types: Cigarettes     Smokeless tobacco: Never Used   Substance and Sexual Activity     Alcohol use: Yes     Comment: little     Drug use: No     Sexual activity: Not Currently   Lifestyle     Physical activity     Days per week: Not on file     Minutes per session: Not on file     Stress: Not on file   Relationships     Social connections     Talks on phone: Not on file     Gets together: Not on file     Attends Muslim service: Not on file     Active member of club or organization: Not on file     Attends meetings of clubs or organizations: Not on file     Relationship status: Not on file     Intimate partner violence     Fear of current or ex partner: Not on file     Emotionally abused: Not on file     Physically abused: Not on file     Forced sexual activity: Not on file   Other Topics Concern     Parent/sibling w/ CABG, MI or angioplasty before 65F 55M? Yes   Social History Narrative     Not on file       Family History  Family History   Problem Relation Age of Onset     Diabetes Mother      Hypertension Mother      Hypertension Father      Heart Disease Brother         MI x2, CABG, 1st MI at 49-49 yo.      Hypertension Brother      C.A.D. Brother      Alzheimer Disease Brother      Diabetes Sister          maybe     Coronary Artery Disease Brother      Heart Disease Brother      Melanoma No family hx of        Review of Systems  As per HPI and PMHx, otherwise 10+ comprehensive system review is negative.    Physical Exam  BP (!) 143/72 (BP Location: Left arm, Patient Position: Chair, Cuff Size: Adult Large)   Pulse 88   Temp 98.4  F (36.9  C) (Tympanic)   Wt 116.6 kg (257 lb)   BMI 40.25 kg/m    GENERAL: Patient is a pleasant, cooperative 70 year old male in no acute distress.  HEAD: Normocephalic, atraumatic.  Hair and scalp are normal.  EYES: Pupils  are equal, round, reactive to light and accommodation.  Extraocular movements are intact.  The sclera nonicteric without injection.  The extraocular structures are normal.  EARS: Normal shape and symmetry.  No tenderness when palpating the mastoid or tragal areas bilaterally.  Hearing aids in place.    NOSE: Nares are patent.  Nasal mucosa is boggy and inflamed with sticky, inflammatory mucus.  The patient's left nasal septal deviation.  The inferior turbinates are markedly hypertrophied right greater than left.  No nasal cavity masses, polyps, or mucopurulence on anterior rhinoscopy.  NEUROLOGIC: Cranial nerves II through XII are grossly intact.  Voice is strong.  Patient is House-Brackmann I/VI bilaterally.  CARDIOVASCULAR: Extremities are warm and well-perfused.  No significant peripheral edema.  RESPIRATORY: Patient has nonlabored breathing without cough, wheeze, stridor.  PSYCHIATRIC: Patient is alert and oriented.  Mood and affect appear normal.  SKIN: Warm and dry.  No scalp, face, or neck lesions noted.    Procedure: Flexible Nasal Endoscopy  Indication: Chronic nasal congestion    To best visualize the sinonasal anatomy and due to the chief complaint and HPI, I proceeded with flexible fiberoptic nasal endoscopy.  The bilateral nasal cavities were anesthetized and decongested with a mixture of lidocaine and neosynephrine.  The bilateral nasal cavities were then examined using flexible fiberoptic nasal endoscope.  The right nasal cavity was without masses, polyps, or mucopurulence.  The right middle turbinate and middle meatus was normal in appearance.  The sphenoethmoid recess was clear.  The left nasal cavity was without masses, polyps, or mucopurulence.  The left middle turbinate and middle meatus was normal in appearance.  The sphenoethmoid recess was clear.  The sinonasal mucosa appeared boggy and inflamed with sticky, inflammatory mucus.  The nasal septum deviates to the left with spur.  The patient has  severe/marked inferior turbinate hypertrophy right greater than left.  The nasopharynx had a normal appearance with normal Eustachian tube openings and fossa of Rosenmuller bilaterally.  Minimal adenoid tissue.  The scope was removed.  The patient tolerated the procedure well.    Assessment and Plan     ICD-10-CM    1. Nasal congestion  R09.81 NASAL ENDOSCOPY, DIAGNOSTIC     fluticasone (FLONASE) 50 MCG/ACT nasal spray   2. Chronic rhinitis  J31.0 NASAL ENDOSCOPY, DIAGNOSTIC     fluticasone (FLONASE) 50 MCG/ACT nasal spray   3. Deviated nasal septum  J34.2 NASAL ENDOSCOPY, DIAGNOSTIC     fluticasone (FLONASE) 50 MCG/ACT nasal spray   4. Hypertrophy of both inferior nasal turbinates  J34.3 NASAL ENDOSCOPY, DIAGNOSTIC     fluticasone (FLONASE) 50 MCG/ACT nasal spray     It was my pleasure seeing Fco Arroyo today in clinic.  The patient presents with nasal obstruction.  His physical exam is consistent with chronic rhinitis.  He does have a mild leftward nasal septal deviation with fairly significant turbinate hypertrophy.  He failed a topical antihistamine nasal spray.  We discussed a trial of a topical nasal steroid in combination with nasal saline irrigation.  We reviewed nasal saline irrigation technique.  Discussed proper nasal steroid application technique.  The patient may benefit from allergy testing. If medical management fails the next steps may include nasal surgery in the form of septoplasty with inferior turbinate reduction and/or out-fracture.    Fco to follow up with Primary Care provider regarding elevated blood pressure.    Erich Hall MD  Department of Otolarygology-Head and Neck Surgery  [facility]         Again, thank you for allowing me to participate in the care of your patient.        Sincerely,        Erich Hall MD

## 2020-11-02 NOTE — PATIENT INSTRUCTIONS
Per physician's instructions    NASAL SALINE IRRIGATION INSTRUCTIONS    You will be starting nasal saline irrigations and will need to obtain the following:      - NeilMed Sinus Rinse 8 oz Kit  - Distilled or filtered water   - Normal saline salt packets    Place filtered or distilled water into the NeilMed bottle up to the fill line (DO NOT USE TAP OR WELL WATER).  Place the pre-made salt packet in the 8 oz of saline.  Shake the bottle to suspend into solution.  Lean head forward over a sink or a basin.  Rinse each side of the nose with one-half of the bottle (each squeeze is about one-half of the bottle). Rinse the nose daily.     If you use topical nasal sprays, apply following irrigation.    Video example: https://www.youBeatrobo.com/watch?v=[email or web address]

## 2020-11-02 NOTE — NURSING NOTE
"Initial BP (!) 143/72 (BP Location: Left arm, Patient Position: Chair, Cuff Size: Adult Large)   Pulse 88   Temp 98.4  F (36.9  C) (Tympanic)   Wt 116.6 kg (257 lb)   BMI 40.25 kg/m   Estimated body mass index is 40.25 kg/m  as calculated from the following:    Height as of 10/21/20: 1.702 m (5' 7\").    Weight as of this encounter: 116.6 kg (257 lb). .    Shruthi Contreras LPN    "

## 2020-11-11 ENCOUNTER — VIRTUAL VISIT (OUTPATIENT)
Dept: FAMILY MEDICINE | Facility: CLINIC | Age: 70
End: 2020-11-11
Payer: COMMERCIAL

## 2020-11-11 DIAGNOSIS — F41.1 GENERALIZED ANXIETY DISORDER: ICD-10-CM

## 2020-11-11 DIAGNOSIS — R60.0 EDEMA LEG: Primary | ICD-10-CM

## 2020-11-11 PROCEDURE — 96127 BRIEF EMOTIONAL/BEHAV ASSMT: CPT | Mod: 59 | Performed by: FAMILY MEDICINE

## 2020-11-11 PROCEDURE — 99214 OFFICE O/P EST MOD 30 MIN: CPT | Mod: 95 | Performed by: FAMILY MEDICINE

## 2020-11-11 RX ORDER — FUROSEMIDE 20 MG
20 TABLET ORAL DAILY
Qty: 30 TABLET | Refills: 1 | Status: SHIPPED | OUTPATIENT
Start: 2020-11-11

## 2020-11-11 ASSESSMENT — ANXIETY QUESTIONNAIRES
1. FEELING NERVOUS, ANXIOUS, OR ON EDGE: SEVERAL DAYS
3. WORRYING TOO MUCH ABOUT DIFFERENT THINGS: NOT AT ALL
5. BEING SO RESTLESS THAT IT IS HARD TO SIT STILL: NOT AT ALL
6. BECOMING EASILY ANNOYED OR IRRITABLE: NOT AT ALL
7. FEELING AFRAID AS IF SOMETHING AWFUL MIGHT HAPPEN: NOT AT ALL
GAD7 TOTAL SCORE: 1
IF YOU CHECKED OFF ANY PROBLEMS ON THIS QUESTIONNAIRE, HOW DIFFICULT HAVE THESE PROBLEMS MADE IT FOR YOU TO DO YOUR WORK, TAKE CARE OF THINGS AT HOME, OR GET ALONG WITH OTHER PEOPLE: NOT DIFFICULT AT ALL
2. NOT BEING ABLE TO STOP OR CONTROL WORRYING: NOT AT ALL

## 2020-11-11 ASSESSMENT — PATIENT HEALTH QUESTIONNAIRE - PHQ9
5. POOR APPETITE OR OVEREATING: NOT AT ALL
SUM OF ALL RESPONSES TO PHQ QUESTIONS 1-9: 0

## 2020-11-11 NOTE — PROGRESS NOTES
"Fco Arroyo is a 70 year old male who is being evaluated via a billable telephone visit.    Chief Complaint   Patient presents with     Anxiety     Swelling     Patient had previous test completed for swelling in bilateral foot and would liek to discuss a water pill.        The patient has been notified of following:     \"This telephone visit will be conducted via a call between you and your physician/provider. We have found that certain health care needs can be provided without the need for a physical exam.  This service lets us provide the care you need with a short phone conversation.  If a prescription is necessary we can send it directly to your pharmacy.  If lab work is needed we can place an order for that and you can then stop by our lab to have the test done at a later time.    Telephone visits are billed at different rates depending on your insurance coverage. During this emergency period, for some insurers they may be billed the same as an in-person visit.  Please reach out to your insurance provider with any questions.    If during the course of the call the physician/provider feels a telephone visit is not appropriate, you will not be charged for this service.\"    Patient has given verbal consent for Telephone visit?  Yes    What phone number would you like to be contacted at? [phone]    How would you like to obtain your AVS? Mail a copy    Subjective     Fco Arroyo is a 70 year old male who presents via phone visit today for the following health issues:    HPI      Chief Complaint   Patient presents with     Anxiety     Swelling     Patient had previous test completed for swelling in bilateral foot and would liek to discuss a water pill.        Depression and Anxiety Follow-Up    How are you doing with your depression since your last visit? Improved     How are you doing with your anxiety since your last visit?  Improved getting  better    Are you having other symptoms that might be " associated with depression or anxiety? Yes:  anxious at times    Have you had a significant life event? No     Do you have any concerns with your use of alcohol or other drugs? No    Social History     Tobacco Use     Smoking status: Former Smoker     Types: Cigarettes     Smokeless tobacco: Never Used   Substance Use Topics     Alcohol use: Yes     Comment: rare     Drug use: No     PHQ 5/27/2020 10/21/2020 11/11/2020   PHQ-9 Total Score 2 3 0   Q9: Thoughts of better off dead/self-harm past 2 weeks Not at all Not at all Not at all     FLOWER-7 SCORE 5/27/2020 10/21/2020 11/11/2020   Total Score 3 2 1     Last PHQ-9 11/11/2020   1.  Little interest or pleasure in doing things 0   2.  Feeling down, depressed, or hopeless 0   3.  Trouble falling or staying asleep, or sleeping too much 0   4.  Feeling tired or having little energy 0   5.  Poor appetite or overeating 0   6.  Feeling bad about yourself 0   7.  Trouble concentrating 0   8.  Moving slowly or restless 0   Q9: Thoughts of better off dead/self-harm past 2 weeks 0   PHQ-9 Total Score 0   Difficulty at work, home, or with people Not difficult at all     FLOWER-7  11/11/2020   1. Feeling nervous, anxious, or on edge 1   2. Not being able to stop or control worrying 0   3. Worrying too much about different things 0   4. Trouble relaxing 0   5. Being so restless that it is hard to sit still 0   6. Becoming easily annoyed or irritable 0   7. Feeling afraid, as if something awful might happen 0   FLOWER-7 Total Score 1   If you checked any problems, how difficult have they made it for you to do your work, take care of things at home, or get along with other people? Not difficult at all       Suicide Assessment Five-step Evaluation and Treatment (SAFE-T)      How many servings of fruits and vegetables do you eat daily?  0-1    On average, how many sweetened beverages do you drink each day (Examples: soda, juice, sweet tea, etc.  Do NOT count diet or artificially sweetened  "beverages)?   1    How many days per week do you exercise enough to make your heart beat faster? 3 or less    How many minutes a day do you exercise enough to make your heart beat faster? 9 or less    How many days per week do you miss taking your medication? 0             Review of Systems   Constitutional, HEENT, cardiovascular, pulmonary, gi and gu systems are negative, except as otherwise noted.    Still having swelling in his feet/ankles.  He had echo which showed normal EF, etc.   Wants to try diuretic that we talked about previously    Saw ENT and started on nasal rinses (hasn't tried yet) and nasal spray.   Still gets very congested at night.        Objective          Vitals:  No vitals were obtained today due to virtual visit.    healthy, alert and no distress  PSYCH: Alert and oriented times 3; coherent speech, normal   rate and volume, able to articulate logical thoughts, able   to abstract reason, no tangential thoughts, no hallucinations   or delusions  His affect is normal  RESP: No cough, no audible wheezing, able to talk in full sentences  Remainder of exam unable to be completed due to telephone visits            Assessment/Plan:    Assessment & Plan     Generalized anxiety disorder     - sertraline (ZOLOFT) 50 MG tablet; Take 1 tablet (50 mg) by mouth daily  - **Basic metabolic panel FUTURE anytime; Future    Edema leg  Recheck BP and BMP in 2-3 weeks.   - furosemide (LASIX) 20 MG tablet; Take 1 tablet (20 mg) by mouth daily     BMI:   Estimated body mass index is 40.25 kg/m  as calculated from the following:    Height as of 10/21/20: 1.702 m (5' 7\").    Weight as of 11/2/20: 116.6 kg (257 lb).                No follow-ups on file.    Sandy Clay MD  [facility]    Phone call duration:  7 minutes                   "

## 2020-11-12 DIAGNOSIS — E11.9 TYPE 2 DIABETES MELLITUS WITHOUT COMPLICATION (H): ICD-10-CM

## 2020-11-12 DIAGNOSIS — E11.9 TYPE 2 DIABETES, HBA1C GOAL < 8% (H): ICD-10-CM

## 2020-11-12 RX ORDER — LANCETS
1 EACH MISCELLANEOUS 2 TIMES DAILY
Qty: 200 EACH | Refills: 1 | Status: SHIPPED | OUTPATIENT
Start: 2020-11-12

## 2020-11-12 RX ORDER — BLOOD SUGAR DIAGNOSTIC
STRIP MISCELLANEOUS
Qty: 200 EACH | Refills: 1 | Status: SHIPPED | OUTPATIENT
Start: 2020-11-12

## 2020-11-12 ASSESSMENT — ANXIETY QUESTIONNAIRES: GAD7 TOTAL SCORE: 1

## 2020-11-12 NOTE — TELEPHONE ENCOUNTER
Reason for call:    Symptom or request:     Patient called requesting test strip and lancets.    [facility]        Best Time:  any    Can we leave a detailed message on this number?  YES     [facility]

## 2020-11-27 ENCOUNTER — TELEPHONE (OUTPATIENT)
Dept: UROLOGY | Facility: CLINIC | Age: 70
End: 2020-11-27

## 2020-11-27 NOTE — TELEPHONE ENCOUNTER
Reason for Call:  Other     Detailed comments: Pt's wife, Harmony, calling (no CTC on file):  Pt had a VV on 10/14 for urinary retention. He was started on Flomax. He has been taking furosemide prescribed by his PCP for a couple weeks. Last night pt starting having difficulty urinating - just a trickle. He did not take the water pill this morning. He is still taking the Flomax - want to know if he should increase the Flomax? - Please advise    PHARMACY:  [facility] - [place]    Phone Number Patient can be reached at: Home number on file [phone] (home)    Best Time: Any    Can we leave a detailed message on this number? YES    Call taken on 11/27/2020 at 3:11 PM by Denise Behrendt

## 2020-11-27 NOTE — TELEPHONE ENCOUNTER
Spoke to wife and she stated that pt is having some trouble urinating. He reported for the last week or so it has been a smaller stream and last night went to a trickle. Pt feels like he is emptying the bladder. He is concerned that he will end up needing a catheter again. Advised pt to continue drinking his fluids and taking his medications. Advised to be seen in ER if unable to urinate and advised to try and empty bladder more often than waiting until he had pressure. Pt verbalized understanding. Please advise if pt should increase Flomax or should he be seen in clinic.  Merlyn VARGAS RN BSN PHN  [facility]

## 2020-11-30 NOTE — TELEPHONE ENCOUNTER
Spoke with wife and she stated that he is doing better, almost normal as of today. Wife stated that the patient was not home right now and would have him call if he wanted to come to clinic today or tomorrow to be seen by Dr. Armando.   Merlyn VARGAS RN BSN PHN  [facility]      FUAD Armando MD  You Yesterday (9:19 AM)     Have him come into clinic on Monday morning or Tuesday    Message text

## 2020-12-07 DIAGNOSIS — H34.8112 CENTRAL RETINAL VEIN OCCLUSION OF RIGHT EYE, UNSPECIFIED COMPLICATION STATUS (H): ICD-10-CM

## 2020-12-08 RX ORDER — ROSUVASTATIN CALCIUM 20 MG/1
TABLET, COATED ORAL
Qty: 90 TABLET | Refills: 1 | Status: SHIPPED | OUTPATIENT
Start: 2020-12-08

## 2020-12-08 NOTE — TELEPHONE ENCOUNTER
"Requested Prescriptions   Pending Prescriptions Disp Refills     rosuvastatin (CRESTOR) 20 MG tablet [Pharmacy Med Name: Rosuvastatin Calcium 20 MG Oral Tablet] 90 tablet 0     Sig: Take 1 tablet by mouth once daily       Statins Protocol Passed - 12/7/2020  5:27 PM        Passed - LDL on file in past 12 months     Recent Labs   Lab Test 10/21/20  1542   LDL 36             Passed - No abnormal creatine kinase in past 12 months     No lab results found.             Passed - Recent (12 mo) or future (30 days) visit within the authorizing provider's specialty     Patient has had an office visit with the authorizing provider or a provider within the authorizing providers department within the previous 12 mos or has a future within next 30 days. See \"Patient Info\" tab in inbasket, or \"Choose Columns\" in Meds & Orders section of the refill encounter.              Passed - Medication is active on med list        Passed - Patient is age 18 or older             "

## 2021-01-08 ENCOUNTER — TRANSFERRED RECORDS (OUTPATIENT)
Dept: HEALTH INFORMATION MANAGEMENT | Facility: CLINIC | Age: 71
End: 2021-01-08

## 2021-01-08 LAB — RETINOPATHY: NORMAL

## 2021-01-18 ENCOUNTER — OFFICE VISIT (OUTPATIENT)
Dept: DERMATOLOGY | Facility: CLINIC | Age: 71
End: 2021-01-18
Payer: COMMERCIAL

## 2021-01-18 ENCOUNTER — TELEPHONE (OUTPATIENT)
Dept: DERMATOLOGY | Facility: CLINIC | Age: 71
End: 2021-01-18

## 2021-01-18 VITALS
OXYGEN SATURATION: 95 % | RESPIRATION RATE: 16 BRPM | DIASTOLIC BLOOD PRESSURE: 82 MMHG | HEART RATE: 95 BPM | SYSTOLIC BLOOD PRESSURE: 177 MMHG

## 2021-01-18 DIAGNOSIS — L82.0 INFLAMED SEBORRHEIC KERATOSIS: ICD-10-CM

## 2021-01-18 DIAGNOSIS — D18.01 ANGIOMA OF SKIN: ICD-10-CM

## 2021-01-18 DIAGNOSIS — L57.0 AK (ACTINIC KERATOSIS): Primary | ICD-10-CM

## 2021-01-18 DIAGNOSIS — Z85.828 HISTORY OF SKIN CANCER: ICD-10-CM

## 2021-01-18 DIAGNOSIS — L82.1 SEBORRHEIC KERATOSIS: ICD-10-CM

## 2021-01-18 DIAGNOSIS — D23.9 DERMAL NEVUS: ICD-10-CM

## 2021-01-18 DIAGNOSIS — L81.4 LENTIGO: ICD-10-CM

## 2021-01-18 PROCEDURE — 99213 OFFICE O/P EST LOW 20 MIN: CPT | Mod: 25 | Performed by: DERMATOLOGY

## 2021-01-18 PROCEDURE — 17110 DESTRUCTION B9 LES UP TO 14: CPT | Performed by: DERMATOLOGY

## 2021-01-18 PROCEDURE — 17000 DESTRUCT PREMALG LESION: CPT | Mod: 59 | Performed by: DERMATOLOGY

## 2021-01-18 RX ORDER — CALCIPOTRIENE 50 UG/G
CREAM TOPICAL
Qty: 60 G | Refills: 3 | Status: SHIPPED | OUTPATIENT
Start: 2021-01-18

## 2021-01-18 RX ORDER — FLUOROURACIL 50 MG/G
CREAM TOPICAL
Qty: 40 G | Refills: 1 | Status: SHIPPED | OUTPATIENT
Start: 2021-01-18

## 2021-01-18 NOTE — PROGRESS NOTES
Fco Arroyo is an extremely pleasant 70 year old year old male patient here today for spot son scalp, L NSW and r lower eyelid.   .   Patient states this has been present for a while.  Patient reports the following symptoms:  rough.  Patient reports the following previous treatments none.  These treatments did not work.  Patient reports the following modifying factors none.  Associated symptoms: none.  Patient has no other skin complaints today.  Remainder of the HPI, Meds, PMH, Allergies, FH, and SH was reviewed in chart.      Past Medical History:   Diagnosis Date     DIABETES MELLITUS TYPE II-UNCOMPL 8/17/2005     Essential hypertension, benign     on meds, Stress Echo 1991 - normal, but suboptimal exertion     Other and unspecified hyperlipidemia      Other isolated or specific phobias     atenolol helps, wants open MRI     Other specified causes of urethral stricture     cystoscopy 1997 some hematuria in past - benign     Squamous cell carcinoma        Past Surgical History:   Procedure Laterality Date     SURGICAL HISTORY OF -   1998    open cholecystectomy for gallstones s/p ERCP     SURGICAL HISTORY OF -   1997    cystoscopy     SURGICAL HISTORY OF -   11/1997    Urethral stricture        Family History   Problem Relation Age of Onset     Diabetes Mother      Hypertension Mother      Hypertension Father      Heart Disease Brother         MI x2, CABG, 1st MI at 49-51 yo.      Hypertension Brother      C.A.D. Brother      Alzheimer Disease Brother      Diabetes Sister          maybe     Coronary Artery Disease Brother      Heart Disease Brother      Melanoma No family hx of        Social History     Socioeconomic History     Marital status:      Spouse name: Not on file     Number of children: Not on file     Years of education: Not on file     Highest education level: Not on file   Occupational History     Not on file   Social Needs     Financial resource strain: Not on file     Food insecurity      Worry: Not on file     Inability: Not on file     Transportation needs     Medical: Not on file     Non-medical: Not on file   Tobacco Use     Smoking status: Former Smoker     Types: Cigarettes     Smokeless tobacco: Never Used   Substance and Sexual Activity     Alcohol use: Yes     Comment: rare     Drug use: No     Sexual activity: Not Currently   Lifestyle     Physical activity     Days per week: Not on file     Minutes per session: Not on file     Stress: Not on file   Relationships     Social connections     Talks on phone: Not on file     Gets together: Not on file     Attends Rastafari service: Not on file     Active member of club or organization: Not on file     Attends meetings of clubs or organizations: Not on file     Relationship status: Not on file     Intimate partner violence     Fear of current or ex partner: Not on file     Emotionally abused: Not on file     Physically abused: Not on file     Forced sexual activity: Not on file   Other Topics Concern     Parent/sibling w/ CABG, MI or angioplasty before 65F 55M? Yes   Social History Narrative     Not on file       Outpatient Encounter Medications as of 1/18/2021   Medication Sig Dispense Refill     ASPIRIN 81 MG OR TABS 1 TABLET DAILY       blood glucose (ACCU-CHEK SMARTVIEW) test strip Use to test blood sugar 2 times daily or as directed. 200 each 1     blood glucose (ACCU-CHEK SMARTVIEW) test strip Use to test blood sugar 1 times daily or as directed. 50 strip 12     blood glucose monitoring (ACCU-CHEK FASTCLIX) lancets 1 each 2 times daily Use to test blood sugar 2 times daily or as directed. 200 each 1     busPIRone (BUSPAR) 5 MG tablet TAKE 1 TABLET BY MOUTH EVERY 8 HOURS AS NEEDED FOR ANXIETY 30 tablet 3     cinnamon 500 MG CAPS Take  by mouth.       fluticasone (FLONASE) 50 MCG/ACT nasal spray Spray 2 sprays into both nostrils daily 47.4 mL 3     furosemide (LASIX) 20 MG tablet Take 1 tablet (20 mg) by mouth daily 30 tablet 1      glipiZIDE (GLIPIZIDE XL) 2.5 MG 24 hr tablet Take 1 tablet (2.5 mg) by mouth daily 90 tablet 3     lisinopril (ZESTRIL) 40 MG tablet Take 1 tablet (40 mg) by mouth daily 90 tablet 3     metFORMIN (GLUCOPHAGE) 1000 MG tablet Take 1 tablet (1,000 mg) by mouth 2 times daily (with meals) 180 tablet 3     Misc Natural Products (TOTAL MEMORY & FOCUS FORMULA) TABS Take 1 tablet by mouth daily       Red Yeast Rice Extract 600 MG TABS Taking 1200 mg daily.       rosuvastatin (CRESTOR) 20 MG tablet Take 1 tablet by mouth once daily 90 tablet 1     sertraline (ZOLOFT) 50 MG tablet Take 1 tablet (50 mg) by mouth daily 90 tablet 3     tamsulosin (FLOMAX) 0.4 MG capsule Take 1 capsule (0.4 mg) by mouth daily 90 capsule 3     VITAMIN C 500 MG PO TABS 1 TABLET  DAILY       VITAMIN E daily       No facility-administered encounter medications on file as of 1/18/2021.              Review Of Systems  Skin: As above  Eyes: negative  Ears/Nose/Throat: negative  Respiratory: No shortness of breath, dyspnea on exertion, cough, or hemoptysis  Cardiovascular: negative  Gastrointestinal: negative  Genitourinary: negative  Musculoskeletal: negative  Neurologic: negative  Psychiatric: negative  Hematologic/Lymphatic/Immunologic: negative  Endocrine: negative      O:   NAD, WDWN, Alert & Oriented, Mood & Affect wnl, Vitals stable   Here today with wife    BP (!) 177/82 (BP Location: Right arm, Patient Position: Sitting, Cuff Size: Adult Large)   Pulse 95   Resp 16   SpO2 95%    General appearance normal   Vitals stable   Alert, oriented and in no acute distress      Following lymph nodes palpated: Occipital, Cervical, Supraclavicular no lad   L NSW gritty papule   R lower lid inflamed seborrheic keratosis    Gritty papules on scalp        Stuck on papules and brown macules on trunk and ext   Red papules on trunk  Flesh colored papules on trunk     The remainder of expanded problem focused exam was normal; the following areas were examined:   scalp/hair, conjunctiva/lids, face, neck, lips, back, ab , chest, digits/nails, RUE, LUE.      Eyes: Conjunctivae/lids:Normal     ENT: Lips, buccal mucosa, tongue: normal    MSK:Normal    Cardiovascular: peripheral edema none    Pulm: Breathing Normal    Lymph Nodes: No Head and Neck Lymphadenopathy     Neuro/Psych: Orientation:Alert and Orientedx3 ; Mood/Affect:normal       A/P:  1. Seborrheic keratosis, lentigo, angioma, dermal nevus, hx of non-melanoma skin cancer   2. R lower lid inflamed seborrheic keratosis   LN2:  Treated with LN2 for 5s for 1-2 cycles. Warned risks of blistering, pain, pigment change, scarring, and incomplete resolution.  Advised patient to return if lesions do not completely resolve.  Wound care sheet given.  3. Actinic keratosis   L NSW   LN2:  Treated with LN2 for 5s for 1-2 cycles. Warned risks of blistering, pain, pigment change, scarring, and incomplete resolution.  Advised patient to return if lesions do not completely resolve.  Wound care sheet given.  Scalp efudex discussed with patient   Using 5-Flurouracil Cream    5-Fluorouracil (5FU) topical cream (brand names Efudex, Carac) is a prescription topical medicine to treat actinic keratoses (pre-squamous cell skin cancer lesions), sun-damaged skin as well as superficial skin cancers.    When applied the areas of sun-damaged skin, the 5FU will  find  damaged skin cells and destroy them.   During treatment, the skin will become red and look very irritated. This is the expected  normal response,  Some patients using 5FU show minimal redness and scaling while others have a very  vigorous  response where the skin scabs and peels. The important thing to realize is that 5FU is treating sun-damaged skin that carries skin cancer risk.      While the skin is irritated, open, sore or scabbed you can apply aquaphor, vaseline or 1% hydrocortisone cream in the morning.     You should apply a thin layer of the cream to the affected area twice a day  for 2  weeks every night. A strip of cream the length of your finger tip should be enough to cover your entire face.  For tougher skin like arms, legs, or back, we may suggest longer treatment plans.  However if you react really strong and fast, you might stop earlier or use less frequently. - please call if it is very strong and you are concern you might need to stop early.     If you prescription coverage allows we may add calcipotriene (Dovonex ), a vitamin-D derivative, to the treatment plan.  In these cases we will have you mix the calcipotriene with the efudex to help shorten the treatment course and improve outcomes.      Typically very strong reactions are related to lots of underlying sun damage, and this means you are getting a good response to the medication. However, there is no need to be miserable while using this. Please let us know if you are having trouble or concerns!      It was a pleasure speaking to Fco Arroyo today.  BENIGN LESIONS DISCUSSED WITH PATIENT:  I discussed the specifics of tumor, prognosis, and genetics of benign lesions.  I explained that treatment of these lesions would be purely cosmetic and not medically neccessary.  I discussed with patient different removal options including excision, cautery and /or laser.      Signs and Symptoms of skin cancer discussed with patient.  Patient encouraged to perform monthly skin exams.  UV precautions reviewed with patient.  Patient to follow up with Primary Care provider regarding elevated blood pressure.  Return to clinic 3 months

## 2021-01-18 NOTE — NURSING NOTE
"Initial BP (!) 177/82 (BP Location: Right arm, Patient Position: Sitting, Cuff Size: Adult Large)   Pulse 95   Resp 16   SpO2 95%  Estimated body mass index is 40.25 kg/m  as calculated from the following:    Height as of 10/21/20: 1.702 m (5' 7\").    Weight as of 11/2/20: 116.6 kg (257 lb). .    Tete Crum, [facility]    "

## 2021-01-18 NOTE — LETTER
1/18/2021         RE: Fco Arroyo  [address]  [facility] [place] [address]        Dear Colleague,    Thank you for referring your patient, Fco Arroyo, to the [facility]. Please see a copy of my visit note below.    Fco Arroyo is an extremely pleasant 70 year old year old male patient here today for spot son scalp, L NSW and r lower eyelid.   .   Patient states this has been present for a while.  Patient reports the following symptoms:  rough.  Patient reports the following previous treatments none.  These treatments did not work.  Patient reports the following modifying factors none.  Associated symptoms: none.  Patient has no other skin complaints today.  Remainder of the HPI, Meds, PMH, Allergies, FH, and SH was reviewed in chart.      Past Medical History:   Diagnosis Date     DIABETES MELLITUS TYPE II-UNCOMPL 8/17/2005     Essential hypertension, benign     on meds, Stress Echo 1991 - normal, but suboptimal exertion     Other and unspecified hyperlipidemia      Other isolated or specific phobias     atenolol helps, wants open MRI     Other specified causes of urethral stricture     cystoscopy 1997 some hematuria in past - benign     Squamous cell carcinoma        Past Surgical History:   Procedure Laterality Date     SURGICAL HISTORY OF -   1998    open cholecystectomy for gallstones s/p ERCP     SURGICAL HISTORY OF -   1997    cystoscopy     SURGICAL HISTORY OF -   11/1997    Urethral stricture        Family History   Problem Relation Age of Onset     Diabetes Mother      Hypertension Mother      Hypertension Father      Heart Disease Brother         MI x2, CABG, 1st MI at 49-51 yo.      Hypertension Brother      C.A.D. Brother      Alzheimer Disease Brother      Diabetes Sister          maybe     Coronary Artery Disease Brother      Heart Disease Brother      Melanoma No family hx of        Social History     Socioeconomic History     Marital status:       Spouse name: Not on file     Number of children: Not on file     Years of education: Not on file     Highest education level: Not on file   Occupational History     Not on file   Social Needs     Financial resource strain: Not on file     Food insecurity     Worry: Not on file     Inability: Not on file     Transportation needs     Medical: Not on file     Non-medical: Not on file   Tobacco Use     Smoking status: Former Smoker     Types: Cigarettes     Smokeless tobacco: Never Used   Substance and Sexual Activity     Alcohol use: Yes     Comment: rare     Drug use: No     Sexual activity: Not Currently   Lifestyle     Physical activity     Days per week: Not on file     Minutes per session: Not on file     Stress: Not on file   Relationships     Social connections     Talks on phone: Not on file     Gets together: Not on file     Attends [employer]ist service: Not on file     Active member of club or organization: Not on file     Attends meetings of clubs or organizations: Not on file     Relationship status: Not on file     Intimate partner violence     Fear of current or ex partner: Not on file     Emotionally abused: Not on file     Physically abused: Not on file     Forced sexual activity: Not on file   Other Topics Concern     Parent/sibling w/ CABG, MI or angioplasty before 65F 55M? Yes   Social History Narrative     Not on file       Outpatient Encounter Medications as of 1/18/2021   Medication Sig Dispense Refill     ASPIRIN 81 MG OR TABS 1 TABLET DAILY       blood glucose (ACCU-CHEK SMARTVIEW) test strip Use to test blood sugar 2 times daily or as directed. 200 each 1     blood glucose (ACCU-CHEK SMARTVIEW) test strip Use to test blood sugar 1 times daily or as directed. 50 strip 12     blood glucose monitoring (ACCU-CHEK FASTCLIX) lancets 1 each 2 times daily Use to test blood sugar 2 times daily or as directed. 200 each 1     busPIRone (BUSPAR) 5 MG tablet TAKE 1 TABLET BY MOUTH EVERY 8 HOURS AS NEEDED FOR  ANXIETY 30 tablet 3     cinnamon 500 MG CAPS Take  by mouth.       fluticasone (FLONASE) 50 MCG/ACT nasal spray Spray 2 sprays into both nostrils daily 47.4 mL 3     furosemide (LASIX) 20 MG tablet Take 1 tablet (20 mg) by mouth daily 30 tablet 1     glipiZIDE (GLIPIZIDE XL) 2.5 MG 24 hr tablet Take 1 tablet (2.5 mg) by mouth daily 90 tablet 3     lisinopril (ZESTRIL) 40 MG tablet Take 1 tablet (40 mg) by mouth daily 90 tablet 3     metFORMIN (GLUCOPHAGE) 1000 MG tablet Take 1 tablet (1,000 mg) by mouth 2 times daily (with meals) 180 tablet 3     Misc Natural Products (TOTAL MEMORY & FOCUS FORMULA) TABS Take 1 tablet by mouth daily       Red Yeast Rice Extract 600 MG TABS Taking 1200 mg daily.       rosuvastatin (CRESTOR) 20 MG tablet Take 1 tablet by mouth once daily 90 tablet 1     sertraline (ZOLOFT) 50 MG tablet Take 1 tablet (50 mg) by mouth daily 90 tablet 3     tamsulosin (FLOMAX) 0.4 MG capsule Take 1 capsule (0.4 mg) by mouth daily 90 capsule 3     VITAMIN C 500 MG PO TABS 1 TABLET  DAILY       VITAMIN E daily       No facility-administered encounter medications on file as of 1/18/2021.              Review Of Systems  Skin: As above  Eyes: negative  Ears/Nose/Throat: negative  Respiratory: No shortness of breath, dyspnea on exertion, cough, or hemoptysis  Cardiovascular: negative  Gastrointestinal: negative  Genitourinary: negative  Musculoskeletal: negative  Neurologic: negative  Psychiatric: negative  Hematologic/Lymphatic/Immunologic: negative  Endocrine: negative      O:   NAD, WDWN, Alert & Oriented, Mood & Affect wnl, Vitals stable   Here today with wife    BP (!) 177/82 (BP Location: Right arm, Patient Position: Sitting, Cuff Size: Adult Large)   Pulse 95   Resp 16   SpO2 95%    General appearance normal   Vitals stable   Alert, oriented and in no acute distress      Following lymph nodes palpated: Occipital, Cervical, Supraclavicular no lad   L NSW gritty papule   R lower lid inflamed seborrheic  keratosis    Gritty papules on scalp        Stuck on papules and brown macules on trunk and ext   Red papules on trunk  Flesh colored papules on trunk     The remainder of expanded problem focused exam was normal; the following areas were examined:  scalp/hair, conjunctiva/lids, face, neck, lips, back, ab , chest, digits/nails, RUE, LUE.      Eyes: Conjunctivae/lids:Normal     ENT: Lips, buccal mucosa, tongue: normal    MSK:Normal    Cardiovascular: peripheral edema none    Pulm: Breathing Normal    Lymph Nodes: No Head and Neck Lymphadenopathy     Neuro/Psych: Orientation:Alert and Orientedx3 ; Mood/Affect:normal       A/P:  1. Seborrheic keratosis, lentigo, angioma, dermal nevus, hx of non-melanoma skin cancer   2. R lower lid inflamed seborrheic keratosis   LN2:  Treated with LN2 for 5s for 1-2 cycles. Warned risks of blistering, pain, pigment change, scarring, and incomplete resolution.  Advised patient to return if lesions do not completely resolve.  Wound care sheet given.  3. Actinic keratosis   L NSW   LN2:  Treated with LN2 for 5s for 1-2 cycles. Warned risks of blistering, pain, pigment change, scarring, and incomplete resolution.  Advised patient to return if lesions do not completely resolve.  Wound care sheet given.  Scalp efudex discussed with patient   Using 5-Flurouracil Cream    5-Fluorouracil (5FU) topical cream (brand names Efudex, Carac) is a prescription topical medicine to treat actinic keratoses (pre-squamous cell skin cancer lesions), sun-damaged skin as well as superficial skin cancers.    When applied the areas of sun-damaged skin, the 5FU will  find  damaged skin cells and destroy them.   During treatment, the skin will become red and look very irritated. This is the expected  normal response,  Some patients using 5FU show minimal redness and scaling while others have a very  vigorous  response where the skin scabs and peels. The important thing to realize is that 5FU is treating  sun-damaged skin that carries skin cancer risk.      While the skin is irritated, open, sore or scabbed you can apply aquaphor, vaseline or 1% hydrocortisone cream in the morning.     You should apply a thin layer of the cream to the affected area twice a day for 2  weeks every night. A strip of cream the length of your finger tip should be enough to cover your entire face.  For tougher skin like arms, legs, or back, we may suggest longer treatment plans.  However if you react really strong and fast, you might stop earlier or use less frequently. - please call if it is very strong and you are concern you might need to stop early.     If you prescription coverage allows we may add calcipotriene (Dovonex ), a vitamin-D derivative, to the treatment plan.  In these cases we will have you mix the calcipotriene with the efudex to help shorten the treatment course and improve outcomes.      Typically very strong reactions are related to lots of underlying sun damage, and this means you are getting a good response to the medication. However, there is no need to be miserable while using this. Please let us know if you are having trouble or concerns!      It was a pleasure speaking to Fco Arroyo today.  BENIGN LESIONS DISCUSSED WITH PATIENT:  I discussed the specifics of tumor, prognosis, and genetics of benign lesions.  I explained that treatment of these lesions would be purely cosmetic and not medically neccessary.  I discussed with patient different removal options including excision, cautery and /or laser.      Signs and Symptoms of skin cancer discussed with patient.  Patient encouraged to perform monthly skin exams.  UV precautions reviewed with patient.  Patient to follow up with Primary Care provider regarding elevated blood pressure.  Return to clinic 3 months        Again, thank you for allowing me to participate in the care of your patient.        Sincerely,        Javier Bueno MD

## 2021-01-18 NOTE — TELEPHONE ENCOUNTER
Prior Authorization Retail Medication Request    Medication/Dose: calcipotriene (DOVONEX) 0.005 % external cream  ICD code (if different than what is on RX):    Previously Tried and Failed:    Rationale:      Insurance Name:  [employer]  Insurance ID:  [id number]       Pharmacy Information (if different than what is on RX)  Name:  [facility] [place]  Phone:  [phone]  CMM Mai: [id number]

## 2021-01-21 NOTE — TELEPHONE ENCOUNTER
Central Prior Authorization Team   Phone: [phone]      PA Initiation    Medication: calcipotriene (DOVONEX) 0.005 % external cream-Initiated  Insurance Company: [employer] - Phone [phone] Fax [phone]  Pharmacy Filling the Rx: [facility] [address] [place], [place] - [address]  Filling Pharmacy Phone: [phone]  Filling Pharmacy Fax:    Start Date: 1/21/2021

## 2021-01-21 NOTE — TELEPHONE ENCOUNTER
PRIOR AUTHORIZATION DENIED    Medication: calcipotriene (DOVONEX) 0.005 % external cream-DENIED    Denial Date: 1/21/2021    Denial Rational: Medication is being used as an off-label use and is not approved by the FDA.            Appeal Information:

## 2021-03-07 DIAGNOSIS — E11.9 TYPE 2 DIABETES MELLITUS WITHOUT COMPLICATION, WITHOUT LONG-TERM CURRENT USE OF INSULIN (H): ICD-10-CM

## 2021-03-08 NOTE — TELEPHONE ENCOUNTER
"Requested Prescriptions   Pending Prescriptions Disp Refills     metFORMIN (GLUCOPHAGE) 1000 MG tablet [Pharmacy Med Name: metFORMIN HCl 1000 MG Oral Tablet] 180 tablet 0     Sig: TAKE 1 TABLET BY MOUTH TWICE DAILY WITH MEALS       Biguanide Agents Passed - 3/7/2021  7:14 AM        Passed - Patient is age 10 or older        Passed - Patient has documented A1c within the specified period of time.     If HgbA1C is 8 or greater, it needs to be on file within the past 3 months.  If less than 8, must be on file within the past 6 months.     Recent Labs   Lab Test 10/21/20  1542   A1C 6.6*             Passed - Patient's CR is NOT>1.4 OR Patient's EGFR is NOT<45 within past 12 mos.     Recent Labs   Lab Test 10/21/20  1542   GFRESTIMATED 73   GFRESTBLACK 85       Recent Labs   Lab Test 10/21/20  1542   CR 1.02             Passed - Patient does NOT have a diagnosis of CHF.        Passed - Medication is active on med list        Passed - Recent (6 mo) or future (30 days) visit within the authorizing provider's specialty     Patient had office visit in the last 6 months or has a visit in the next 30 days with authorizing provider or within the authorizing provider's specialty.  See \"Patient Info\" tab in inbasket, or \"Choose Columns\" in Meds & Orders section of the refill encounter.                 "

## 2021-03-09 NOTE — TELEPHONE ENCOUNTER
Prescription approved per [facility] Refill Protocol.    Allie KENNEY RN, BSN

## 2021-04-21 ENCOUNTER — TELEPHONE (OUTPATIENT)
Dept: FAMILY MEDICINE | Facility: CLINIC | Age: 71
End: 2021-04-21

## 2021-04-21 NOTE — TELEPHONE ENCOUNTER
Patient Quality Outreach Summary      Summary:    Patient is due/failing the following:   A1C and BP Check    Type of outreach:    Please call    Questions for provider review:    None                                                                                                                    Sandy Clay M.D.         Chart routed to Care Team.

## 2022-10-29 NOTE — PROGRESS NOTES
08/01/18 0700   General Information   Type of Visit Initial OP Ortho PT Evaluation   Start of Care Date 08/01/18   Referring Physician Vannessa Valdivia MD   Patient/Family Goals Statement To help reduce the pain   Orders Evaluate and Treat   Date of Order 07/06/18   Insurance Type Medicare;[employer]   Medical Diagnosis Chronic left shoulder pain       Present No   Body Part(s)   Body Part(s) Shoulder   Presentation and Etiology   Pertinent history of current problem (include personal factors and/or comorbidities that impact the POC) Left shoulder has been hurting off and on for 25 years since he landed on falling about 1 story.     Impairments A. Pain;F. Decreased strength and endurance   Functional Limitations perform activities of daily living;perform required work activities;perform desired leisure / sports activities   Symptom Location Left lateral shoulder, clicking from time to time   How/Where did it occur With repetition/overuse;With a fall;From Degenerative Joint Disease   Onset date of current episode/exacerbation 05/01/18   Chronicity Chronic   Pain rating (0-10 point scale) Best (/10);Worst (/10)   Best (/10) 0   Worst (/10) 2   Pain quality A. Sharp   Frequency of pain/symptoms C. With activity   Pain/symptoms are: Worse in the morning   Pain/symptoms exacerbated by G. Certain positions;H. Overhead reach   Pain/symptoms eased by E. Changing positions   Progression of symptoms since onset: Improved   Current / Previous Interventions   Diagnostic Tests: MRI   MRI Results Results   MRI results 7/6/18 MR left shoulder:   FINDINGS: Mild osteoarthritis at the acromioclavicular and glenohumeral joints. There is some narrowing of the acromiohumeral interval suggesting an underlying rotator cuff tear or tendinosis. No acute fracture or malalignment.   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 No   Have you fallen and had an injury in the past year? No   Is patient a fall risk? No   Shoulder Objective Findings   Side (if bilateral, select both right and left) Left   Posture Clarke rounded shoulders; increased thoracic kyphosis   Cervical Screen (ROM, quadrant) Limited clarke sidebend < 20 deg; limited clarke rotation <70 degrees   Left Shoulder Flexion AROM Right=120 deg; Left=110 deg   Left Shoulder Flexion PROM Left=150 deg   Left Shoulder ER AROM Clarke=60 deg   Left Shoulder IR AROM T7   Left Shoulder Flexion Strength 4/5   Left Shoulder ER Strength 4/5 with lateral shoulder pain   Left Shoulder IR Strength 5/5 pain free   Left Lower Trapezius Strength 4/5   Planned Therapy Interventions   Planned Therapy Interventions joint mobilization;manual therapy;motor coordination training;neuromuscular re-education;ROM;strengthening;stretching   Clinical Impression   Criteria for Skilled Therapeutic Interventions Met yes, treatment indicated   PT Diagnosis Left GH primary impingement   Influenced by the following impairments Pain; weakness; impaired ROM; impaired posture   Functional limitations due to impairments Pain and difficulty reaching overhead, performing ADL's   Clinical Presentation Stable/Uncomplicated   Clinical Presentation Rationale (+) pain improving with rest; motivation  (-) chronicity   Clinical Decision Making (Complexity) Low complexity   Therapy Frequency other (see comments)   Predicted Duration of Therapy Intervention (days/wks) 1x every other week for 8 weeks   Risk & Benefits of therapy have been explained Yes   Patient, Family & other staff in agreement with plan of care Yes   Clinical Impression Comments Fco is a pleasant 69 yo male who presents today with !25 year history of left shoulder pain.  He will benefit from skilled PT.    Education Assessment   Preferred Learning Style Listening;Demonstration;Pictures/video   Barriers to Learning No barriers   ORTHO GOALS   PT Ortho Eval Goals 1;2;3   Ortho Goal 1   Goal Description Patient will be able to reach overhead without difficulty.   Target Date 09/26/18   Ortho Goal 2   Goal Description Patient will have 5/5 GH ER strength to reduce impingement with overhead ADL's.   Target Date 09/12/18   Ortho Goal 3   Goal Description Patient will be independent with his HEP to reduce future occurrence of pain and disability.   Target Date 08/01/18   Date Met 08/01/18   Total Evaluation Time   Total Evaluation Time 20 min   Therapy Certification   Certification date from 08/01/18   Certification date to 09/26/18   Medical Diagnosis Chronic left shoulder pain       Clarita Weber PT, DPT  Doctor of Physical Therapy #[id number]  [facility]  [phone]  [email or web address]     Initial (On Arrival)

## 2023-10-19 NOTE — PROGRESS NOTES
".Fco Arroyo is a 70 year old male who is being evaluated via a telephone visit.      The patient has been notified of the following:     \"We have found that certain health care needs can be provided without the need for a face to face visit.  This service lets us provide the care you need with a short phone conversation.      I will have full access to your [place] medical record during this entire phone call.   I will be taking notes for your medical record.     Since this is like an office visit, we will bill your insurance company for this service.  Please check with your medical insurance if this type of telephone visit/virtual care is covered.  You may be responsible for the cost of this service if insurance coverage is denied.      There are potential benefits and risks of telephone visits (e.g. limits to patient confidentiality) that differ from in-person visits.?  Confidentiality still applies for telephone services, and nobody will record the visit.  It is important to be in a quiet, private space that is free of distractions (including cell phone or other devices) during the visit.??     If during the course of the call I believe a telephone visit is not appropriate, you will not be charged for this service\"    Consent has been obtained for this service by care team member: yes.    Start time: 3:05 PM    End time: 3:30 PM    May 28 , 2020      Behavioral Health Clinician Progress Note    Patient Name: Fco Arroyo           Service Type: Phone Visit      Service Location:  at the patient's home        Session Length: 16 - 37      Attendees: Patient    Visit Activities (Refresh list every visit): [facility] Only    Diagnostic Assessment Date: Not completed  Treatment Plan Review Date: Not completed  See Flowsheets for today's PHQ-9 and FLOWER-7 results  Previous PHQ-9:   PHQ-9 SCORE 2/25/2020 5/27/2020   PHQ-9 Total Score 5 2     Previous FLOWER-7:   FLOWER-7 SCORE 2/25/2020 5/27/2020   Total Score 4 3 " "      CA LEVEL:  CA Score (Last Two) 2/13/2013   CA Raw Score 36   Activation Score 47.4   CA Level 2       DATA  Extended Session (60+ minutes): No  Interactive Complexity: No  Crisis: No    Treatment Objective(s) Addressed in This Session:  Target Behavior(s): disease management/lifestyle changes Decrease anxiety    Anxiety: will experience a reduction in anxiety, will develop more effective coping skills to manage anxiety symptoms, will develop healthy cognitive patterns and beliefs and will increase ability to function adaptively  Adjustment Difficulties: will develop coping/problem-solving skills to facilitate more adaptive adjustment    Current Stressors / Issues:  Per session with patient.  He reports a history of anxiety and that it has been much worse at other times in his life than what it is now.  He also reports that he does not \"like wearing a mask\".  He also reports part of this is due to his disagreements with policies regarding wearing a mask in public places.  He also reports this is not due to his anxiety.  He reports he will plan to go to stores where  a mask is not required.  Discussed breathing skills to use if he notices anxiety causing issues.  Patient will return as needed.      Progress on Treatment Objective(s) / Homework:  None established    Motivational Interviewing    MI Intervention: Expressed Empathy/Understanding, Supported Autonomy, Collaboration, Evocation, Open-ended questions, Reflections: simple and complex, Change talk (evoked) and Reframe     Change Talk Expressed by the Patient: NA - Precontemplative    Provider Response to Change Talk: E - Evoked more info from patient about behavior change, A - Affirmed patient's thoughts, decisions, or attempts at behavior change, R - Reflected patient's change talk and S - Summarized patient's change talk statements      Care Plan review completed: No    Medication Review:  No changes to current psychiatric medication(s)    Medication " Compliance:  Yes    Changes in Health Issues:   Yes: Chronic disease management, Associated Psychological Distress    Chemical Use Review:   Substance Use: Chemical use reviewed, no active concerns identified      Tobacco Use: No current tobacco use.      Assessment: Current Emotional / Mental Status (status of significant symptoms):  Risk status (Self / Other harm or suicidal ideation)  Patient denies a history of suicidal ideation, suicide attempts, self-injurious behavior, homicidal ideation, homicidal behavior and and other safety concerns  Patient denies current fears or concerns for personal safety.  Patient denies current or recent suicidal ideation or behaviors.  Patient denies current or recent homicidal ideation or behaviors.  Patient denies current or recent self injurious behavior or ideation.  Patient denies other safety concerns.  A safety and risk management plan has not been developed at this time, however patient was encouraged to call [place] [phone] should there be a change in any of these risk factors.    Appearance:   Phone visit   Eye Contact:   Phone visit   Psychomotor Behavior: Phone visit   Attitude:   Cooperative  Orion  Orientation:   All  Speech   Rate / Production: Normal/ Responsive Normal    Volume:  Normal   Mood:    Normal  Affect:    Phone visit   Thought Content:  Clear   Thought Form:  Coherent  Logical   Insight:    Good     Diagnoses:  1. Adjustment disorder with anxious mood        Collateral Reports Completed:  Communicated with: Primary care provider as needed    Plan: (Homework, other):  Patient was given information about behavioral services and encouraged to schedule a follow up appointment with the clinic [facility] as needed.  He was also given deep Breathing Strategies to practice when experiencing anxiety.  CD Recommendations: No indications of CD issues. Guerrero (Mindy),ASHLEY,[facility]  ahamn  aNeck strain.   No

## 2025-03-31 ENCOUNTER — HOSPITAL ENCOUNTER (EMERGENCY)
Facility: CLINIC | Age: 75
Discharge: HOME | End: 2025-03-31
Attending: FAMILY MEDICINE | Admitting: FAMILY MEDICINE
Payer: COMMERCIAL

## 2025-03-31 ENCOUNTER — APPOINTMENT (OUTPATIENT)
Dept: GENERAL RADIOLOGY | Facility: CLINIC | Age: 75
End: 2025-03-31
Attending: FAMILY MEDICINE
Payer: COMMERCIAL

## 2025-03-31 ENCOUNTER — APPOINTMENT (OUTPATIENT)
Dept: CT IMAGING | Facility: CLINIC | Age: 75
End: 2025-03-31
Attending: FAMILY MEDICINE
Payer: COMMERCIAL

## 2025-03-31 VITALS
RESPIRATION RATE: 18 BRPM | DIASTOLIC BLOOD PRESSURE: 77 MMHG | WEIGHT: 220 LBS | HEART RATE: 83 BPM | BODY MASS INDEX: 35.36 KG/M2 | TEMPERATURE: 98 F | HEIGHT: 66 IN | OXYGEN SATURATION: 96 % | SYSTOLIC BLOOD PRESSURE: 161 MMHG

## 2025-03-31 DIAGNOSIS — S02.2XXA CLOSED FRACTURE OF NASAL BONE, INITIAL ENCOUNTER: ICD-10-CM

## 2025-03-31 DIAGNOSIS — S49.92XA SHOULDER INJURY, LEFT, INITIAL ENCOUNTER: ICD-10-CM

## 2025-03-31 DIAGNOSIS — S01.81XA FACIAL LACERATION, INITIAL ENCOUNTER: ICD-10-CM

## 2025-03-31 PROCEDURE — 73030 X-RAY EXAM OF SHOULDER: CPT | Mod: LT

## 2025-03-31 PROCEDURE — 99284 EMERGENCY DEPT VISIT MOD MDM: CPT | Mod: 25 | Performed by: FAMILY MEDICINE

## 2025-03-31 PROCEDURE — 99283 EMERGENCY DEPT VISIT LOW MDM: CPT | Mod: 25 | Performed by: FAMILY MEDICINE

## 2025-03-31 PROCEDURE — 12013 RPR F/E/E/N/L/M 2.6-5.0 CM: CPT | Performed by: FAMILY MEDICINE

## 2025-03-31 PROCEDURE — 70486 CT MAXILLOFACIAL W/O DYE: CPT

## 2025-03-31 ASSESSMENT — ACTIVITIES OF DAILY LIVING (ADL)
ADLS_ACUITY_SCORE: 41
ADLS_ACUITY_SCORE: 41

## 2025-03-31 ASSESSMENT — COLUMBIA-SUICIDE SEVERITY RATING SCALE - C-SSRS
2. HAVE YOU ACTUALLY HAD ANY THOUGHTS OF KILLING YOURSELF IN THE PAST MONTH?: NO
1. IN THE PAST MONTH, HAVE YOU WISHED YOU WERE DEAD OR WISHED YOU COULD GO TO SLEEP AND NOT WAKE UP?: NO
6. HAVE YOU EVER DONE ANYTHING, STARTED TO DO ANYTHING, OR PREPARED TO DO ANYTHING TO END YOUR LIFE?: NO

## 2025-03-31 NOTE — ED NOTES
Bed: ED08  Expected date: 3/31/25  Expected time: 4:01 PM  Means of arrival:   Comments:  Fall   Dupixent Counseling: I discussed with the patient the risks of dupilumab including but not limited to eye infection and irritation, cold sores, injection site reactions, worsening of asthma, allergic reactions and increased risk of parasitic infection.  Live vaccines should be avoided while taking dupilumab. Dupilumab will also interact with certain medications such as warfarin and cyclosporine. The patient understands that monitoring is required and they must alert us or the primary physician if symptoms of infection or other concerning signs are noted.

## 2025-03-31 NOTE — ED PROVIDER NOTES
HPI   Patient is a 75-year-old male presenting by EMS transport after a fall.  Per my chart review, the patient does not take medication for anticoagulation.  No prior history of head or neck trauma/surgery.  About an hour ago, the patient was walking into [facility] when he tripped on his boot.  He fell forward, hitting his face on the ground.  There was no loss of consciousness.  He denies headache.  No vomiting.  His wife is here and helps with history.  She tells me that he is acting normally, at baseline.  He denies neck pain.  He does report some left anterior shoulder discomfort that is obvious with moving.  No new back pain.  No chest pain or abdominal pain.  No hip or pelvis pain.  No lower extremity pain or injury.  He denies obvious dental trauma.  He describes his left upper lip as being swollen.  He denies obvious cuts involving the lips.  He denies any injuries inside his mouth.  He denies difficulty with swallowing or breathing.  No vision changes.      Allergies:  Allergies   Allergen Reactions    Morphine GI Disturbance     Problem List:    Patient Active Problem List    Diagnosis Date Noted    Generalized anxiety disorder 10/26/2020     Priority: Medium    Central retinal vein occlusion of right eye, unspecified complication status (H) 06/05/2020     Priority: Medium    Benign essential hypertension 11/16/2016     Priority: Medium    Type 2 diabetes mellitus without complication, without long-term current use of insulin (H) 11/16/2016     Priority: Medium    Morbid obesity (H) 10/25/2015     Priority: Medium    Hyperlipidemia LDL goal <100 11/26/2008     Priority: Medium     with DM2.  LDL a little high - max simvastatin - had changed fm lipitor.      Sensorineural hearing loss, bilateral 11/22/2007     Priority: Medium     gradual - referral to audiology      R PLANTAR FIBROMATOSIS 07/16/2007     Priority: Medium     change footwear,  conservative care.         Past Medical History:    Past Medical  History:   Diagnosis Date    DIABETES MELLITUS TYPE II-UNCOMPL 8/17/2005    Essential hypertension, benign     Other and unspecified hyperlipidemia     Other isolated or specific phobias     Other specified causes of urethral stricture     Squamous cell carcinoma      Past Surgical History:    Past Surgical History:   Procedure Laterality Date    SURGICAL HISTORY OF -   1998    open cholecystectomy for gallstones s/p ERCP    SURGICAL HISTORY OF -   1997    cystoscopy    SURGICAL HISTORY OF -   11/1997    Urethral stricture     Family History:    Family History   Problem Relation Age of Onset    Diabetes Mother     Hypertension Mother     Hypertension Father     Heart Disease Brother         MI x2, CABG, 1st MI at 49-51 yo.     Hypertension Brother     C.A.D. Brother     Alzheimer Disease Brother     Diabetes Sister          maybe    Coronary Artery Disease Brother     Heart Disease Brother     Melanoma No family hx of      Social History:  Marital Status:   [2]  Social History     Tobacco Use    Smoking status: Former     Types: Cigarettes    Smokeless tobacco: Never   Substance Use Topics    Alcohol use: Yes     Comment: rare    Drug use: No      Medications:    ASPIRIN 81 MG OR TABS  blood glucose (ACCU-CHEK SMARTVIEW) test strip  blood glucose (ACCU-CHEK SMARTVIEW) test strip  blood glucose monitoring (ACCU-CHEK FASTCLIX) lancets  busPIRone (BUSPAR) 5 MG tablet  calcipotriene (DOVONEX) 0.005 % external cream  cinnamon 500 MG CAPS  fluorouracil (EFUDEX) 5 % external cream  fluticasone (FLONASE) 50 MCG/ACT nasal spray  furosemide (LASIX) 20 MG tablet  glipiZIDE (GLUCOTROL XL) 2.5 MG 24 hr tablet  lisinopril (ZESTRIL) 40 MG tablet  metFORMIN (GLUCOPHAGE) 1000 MG tablet  Misc Natural Products (TOTAL MEMORY & FOCUS FORMULA) TABS  Red Yeast Rice Extract 600 MG TABS  rosuvastatin (CRESTOR) 20 MG tablet  sertraline (ZOLOFT) 50 MG tablet  tamsulosin (FLOMAX) 0.4 MG capsule  VITAMIN C 500 MG PO TABS  VITAMIN  "E      Review of Systems   All other systems reviewed and are negative.      PE   BP: (!) 170/88  Pulse: 87  Temp: 98  F (36.7  C)  Resp: 18  Height: 167.6 cm (5' 6\")  Weight: 99.8 kg (220 lb)  SpO2: 93 %  Physical Exam  Vitals and nursing note reviewed.   Constitutional:       General: He is not in acute distress.  HENT:      Head:      Comments: There is a 2 cm laceration at the medial aspect of his left eyebrow.  His nose is swollen especially along the bridge.  There is dried blood in both nares.  Extraocular movements are intact.  Anterior chambers normal.  Pupils round and reactive to light, equal.  There is a small 1 cm laceration further down the nose.  Upper lip on the left is swollen, no obvious laceration.  Lower lip mildly swollen, no laceration.     Right Ear: External ear normal.      Left Ear: External ear normal.      Nose: Nose normal.      Mouth/Throat:      Mouth: Mucous membranes are moist.      Pharynx: Oropharynx is clear.   Eyes:      General: No scleral icterus.     Extraocular Movements: Extraocular movements intact.      Conjunctiva/sclera: Conjunctivae normal.      Pupils: Pupils are equal, round, and reactive to light.   Cardiovascular:      Rate and Rhythm: Normal rate.   Pulmonary:      Effort: Pulmonary effort is normal. No respiratory distress.   Abdominal:      Palpations: Abdomen is soft.      Tenderness: There is no abdominal tenderness.   Musculoskeletal:         General: Normal range of motion.      Cervical back: Normal range of motion and neck supple. No tenderness.      Comments: He has pain with attempted passive and active range of motion at the left shoulder.  This is felt at the anterior left shoulder.  No obvious focal tenderness along the clavicle or acromion.  No tenderness at the proximal humerus.  Otherwise nontender to palpation over major muscles, joints, long bones.   Skin:     General: Skin is warm and dry.      Comments: See above for lacerations.   Neurological: "      General: No focal deficit present.      Mental Status: He is alert and oriented to person, place, and time.   Psychiatric:         Behavior: Behavior normal.         ED COURSE and [facility]   1657.  Obvious facial injuries, needing closure.  This will be documented below.  No LOC, no vomiting, no neurological deficit, no altered mental status, not on anticoagulation, not intoxicated.  No emergent need for CT head.  CT face ordered to clarify for fractures.  Left shoulder x-ray ordered.  He denies medicine for analgesia.    1830.  I performed laceration closure on his left eyebrow and on the bridge of his nose.  The left eyebrow size is documented above.  Local anesthetic with lidocaine 1% no epinephrine.  I then placed a #3 simple interrupted stitches using 4-0 Ethilon suture.  The nasal laceration size is documented above.  Local anesthetic with lidocaine 1% no epinephrine is used.  I then placed #3 simple interrupted stitches using 5-0 Ethilon suture.  He is sitting up in bed and eager to leave.  No CT imaging of severe fracture requiring hospitalization or emergent consultation.  Nasal fractures identified, nose is straight and without obvious need for setting or reduction.  Left shoulder without fracture or dislocation.    Electronic medical chart reviewed, including medical problems, medications, medical allergies, social history.  Recent hospitalizations and surgical procedures reviewed.  Recent clinic visits and consultations reviewed.  Recent labs and test results reviewed.  Nursing notes reviewed.    The patient, their parent if applicable, and/or their medical decision maker(s) and I have reviewed all of the available historical information, applicable PMH, physical exam findings, and objective diagnostic data gathered during this ED visit.  We then discussed all work-up options and then together agreed upon the course taken during this visit.  The ultimate disposition and plan was a cooperative decision  made between myself and the patient, their parent if applicable, and/or their legal decision maker(s).  The risks and benefits of all decisions made during this visit were discussed to the best of my abilities given the circumstances, and all parties are understanding of the pertinent ramifications of these decisions.      LABS  Labs Ordered and Resulted from Time of ED Arrival to Time of ED Departure - No data to display    IMAGING  Images reviewed by me.  Radiology report also reviewed.  CT Facial Bones without Contrast   Final Result   IMPRESSION:    1.  Acute fractures of the nasal bones and bony nasal septum.   2.  Moderate hematoma overlying the nasal bones and medial left periorbital soft tissues.   3.  Periapical lucencies and bony defects of the bilateral medial and lateral maxillary incisors. These are favored to represent dental disease rather than acute posttraumatic change. Nonetheless, clinical correlation recommended. Numerous additional    sites of dental disease.      XR Shoulder Left 2 Views   Final Result   IMPRESSION: Bones are demineralized. There is no evidence of an acute displaced fracture or glenohumeral dislocation. Mild-to-moderate glenohumeral and acromioclavicular joint arthrosis.          Procedures    Medications - No data to display      IMPRESSION       ICD-10-CM    1. Facial laceration, initial encounter  S01.81XA ED To Primary Care Referral    eyebrow and nose      2. Closed fracture of nasal bone, initial encounter  S02.2XXA ED To Primary Care Referral      3. Shoulder injury, left, initial encounter  S49.92XA ED To Primary Care Referral               Medication List      There are no discharge medications for this visit.                             Leandro Davis MD  03/31/25 3311

## 2025-03-31 NOTE — DISCHARGE INSTRUCTIONS
RETURN TO THE EMERGENCY ROOM FOR THE FOLLOWING:    Severely worsened pain, fever >101, concerning changes in behavior/confusion, repeated vomiting, or at anytime for any concern.    FOLLOW UP:    With your primary clinic within the next 1 to 2 weeks for reevaluation.  A referral order will be placed at the time of discharge, expect a phone call within the next few days to help with scheduling.    TREATMENT RECOMMENDATIONS:    Antibiotic ointment twice daily to the lacerations that are closed with stitches.      NURSE ADVICE LINE:  [phone] or [phone]

## 2025-03-31 NOTE — ED TRIAGE NOTES
Pt tripped over own feet in [employer] parking lot hitting face on ground.   Pt has bleeding from nose and abrasion to upper nose. Pt alert and oriented, denies loc or blood thinner     Triage Assessment (Adult)       Row Name 03/31/25 1613          Triage Assessment    Airway WDL WDL        Respiratory WDL    Respiratory WDL WDL

## 2025-04-04 ENCOUNTER — APPOINTMENT (OUTPATIENT)
Dept: CT IMAGING | Facility: CLINIC | Age: 75
End: 2025-04-04
Attending: EMERGENCY MEDICINE
Payer: COMMERCIAL

## 2025-04-04 ENCOUNTER — HOSPITAL ENCOUNTER (EMERGENCY)
Facility: CLINIC | Age: 75
Discharge: HOME | End: 2025-04-04
Attending: EMERGENCY MEDICINE | Admitting: EMERGENCY MEDICINE
Payer: COMMERCIAL

## 2025-04-04 VITALS
RESPIRATION RATE: 18 BRPM | TEMPERATURE: 97.7 F | WEIGHT: 220 LBS | OXYGEN SATURATION: 96 % | HEART RATE: 75 BPM | SYSTOLIC BLOOD PRESSURE: 147 MMHG | DIASTOLIC BLOOD PRESSURE: 84 MMHG | BODY MASS INDEX: 35.36 KG/M2 | HEIGHT: 66 IN

## 2025-04-04 DIAGNOSIS — R04.0 EPISTAXIS: ICD-10-CM

## 2025-04-04 DIAGNOSIS — S02.2XXB OPEN FRACTURE OF NASAL BONE, INITIAL ENCOUNTER: ICD-10-CM

## 2025-04-04 PROCEDURE — 70486 CT MAXILLOFACIAL W/O DYE: CPT

## 2025-04-04 PROCEDURE — 99284 EMERGENCY DEPT VISIT MOD MDM: CPT | Mod: 25 | Performed by: EMERGENCY MEDICINE

## 2025-04-04 PROCEDURE — 30901 CONTROL OF NOSEBLEED: CPT | Mod: LT | Performed by: EMERGENCY MEDICINE

## 2025-04-04 ASSESSMENT — ACTIVITIES OF DAILY LIVING (ADL)
ADLS_ACUITY_SCORE: 41

## 2025-04-04 ASSESSMENT — COLUMBIA-SUICIDE SEVERITY RATING SCALE - C-SSRS
6. HAVE YOU EVER DONE ANYTHING, STARTED TO DO ANYTHING, OR PREPARED TO DO ANYTHING TO END YOUR LIFE?: NO
1. IN THE PAST MONTH, HAVE YOU WISHED YOU WERE DEAD OR WISHED YOU COULD GO TO SLEEP AND NOT WAKE UP?: NO
2. HAVE YOU ACTUALLY HAD ANY THOUGHTS OF KILLING YOURSELF IN THE PAST MONTH?: NO

## 2025-04-04 NOTE — ED TRIAGE NOTES
Started at 0545 this AM. Recent fall on Monday and has had intermittent bloody noses since. Not on blood thinners-takes Aspirin 81 mg daily.      Triage Assessment (Adult)       Row Name 04/04/25 0816          Triage Assessment    Airway WDL WDL        Respiratory WDL    Respiratory WDL WDL        Skin Circulation/Temperature WDL    Skin Circulation/Temperature WDL WDL        Cardiac WDL    Cardiac WDL WDL        Peripheral/Neurovascular WDL    Peripheral Neurovascular WDL WDL        Cognitive/Neuro/Behavioral WDL    Cognitive/Neuro/Behavioral WDL WDL

## 2025-04-04 NOTE — ED PROVIDER NOTES
"[facility]  Emergency Department Visit Note    PATIENT:  Fco Arroyo     75 year old     male      [id number]    Chief complaint:  Chief Complaint   Patient presents with    Epistaxis     Started at 0545 this AM. Recent fall on Monday and has had intermittent bloody noses since. Not on blood thinners-takes Aspirin 81 mg daily.         History of present illness:  Patient is a 75 year old male with a medical history significant for type 2 diabetes, CRVO, anxiety, recent fall with subsequent nasal septum and nasal bone fractures presenting for evaluation of epistaxis.  Patient is not on blood thinners.  Takes a baby aspirin daily.  He is here with his wife and his daughter-in-law.  They report that this morning he woke up and had a significant nosebleed.  He seemed to be covered in blood and it was all over his chest and his hands.  They put some pressure over his nose however he continued to bleed.  Seem to be coming from the left nare.  They report that he has been having nosebleeds since his fall about a week ago.  He fell on his face and sustained a nasal bone fracture.  Was seen in this emergency department.  No intervention done to his nose at that time.    He reports that it seems to have stopped bleeding with a nose clamp on that somebody put on in triage.  He otherwise feels fine.  No headache.  Has been taking all of his medications as prescribed    Per chart review patient did have a CT of his facial bones at his last ER visit.  It showed nasal septal and nasal bone fractures.  Patient was not started on antibiotics.  His nose was not bleeding at the time of discharge from the emergency department.    Review of Systems:  As in HPI above    BP (!) 147/84   Pulse 75   Temp 97.7  F (36.5  C) (Oral)   Resp 18   Ht 1.676 m (5' 6\")   Wt 99.8 kg (220 lb)   SpO2 96%   BMI 35.51 kg/m      Physical Exam  Constitutional: laying in hospital bed, alert, oriented, in no apparent " distress, conversant, and answering questions appropriately  HEENT: pupils 3mm, equal, round, and reactive to light, sclerae anicteric, extraocular motions intact, and moist mucous membranes  Neck: able to fully range direct visualization of the left nare does show a white irregularity sticking out of the medial septum.  This is hard to the touch, no surrounding erythema or drainage or purulence, no active bleeding appreciated, there is dried clot all along the left nare, no specific pain with visualization.  Able to completely visualize the left nare.  Bilateral TMs are clear  Cardiovascular: regular rate and rhythm  Pulmonary: breathing comfortably on room air  Abdominal: not visibly distended  Genitourinary: deferred  Extremities/MSK: no peripheral edema and no cyanosis   Skin: warm, dry, non-diaphoretic, and bruising over right eyelid, left supraorbital region, and sutures over nose and eyebrow appear intact with no drainage or redness   Neurologic: moves all four extremities spontaneously and GCS 15  Psychiatric: calm, appropriate      MDM:  Patient is a 75 year old male with above history presenting for evaluation of epistaxis after falling on his nose earlier this week.    Vitals reassuring and within normal limits . Exam notable for small trickle of blood out of left nare otherwise reassuring and within normal limits .    At this point considering patient's recent diagnosis of nasal fracture, I am concerned about an open fracture or dislocated bone that is contributing to his recurrent bleeding.  Plan to order repeat CT of his nose to evaluate for the possibility of nasal bone fracture and dislocation and possibly an open fracture.  Patient's nose is no longer bleeding at this time.    Remainder of ED course below.    ED COURSE:  ED Course as of 04/04/25 1415   Fri Apr 04, 2025   1333 CT Facial Bones without Contrast  IMPRESSION:   1.  Acute appearing and mildly displaced nasal bone nasal fractures.  2.   Chronic appearing deformity of the medial left orbital wall.  3.  Multiple periapical lucencies of the maxillary incisors and right premolar are nonspecific but are favored to represent periapical lucencies, and possible periapical abscesses, rather than acute trauma.     1414 I spoke with ear nose throat about the results on patient's CT scan.  I also discussed that I did directly visualize the inside of patient's left nare and there does appear to be a white area sticking out of the septum, concerning for an open and displaced fracture.  Considering this ENT and I discussed antibiotics, outpatient management with your nose throat.  Patient is in agreement with this plan.  Augmentin sent per the recommendation of ear nose throat.  However as patient was walking out his nose started to trickle blood again.  Came back, clamp replaced, Afrin given and patient no longer has any bleeding of his nose.  He remains vitally stable and comfortable and has no other concerns.  Ultimately I think that the patient does need to be seen by ear nose throat and the dislocated fractures in his nose seem to be contributing to persistent bleeding.  As long as these are dislocated, I suspect he will have some persistent bleeding.  Counseled on the importance of being careful with using Afrin and the clamp and discussed the importance of refraining from manipulating his nose.  He will follow-up with ENT next week and come back with recurrent bleeding that does not get better with the clamp or the Afrin.       Encounter Diagnoses:  Final diagnoses:   Open fracture of nasal bone, initial encounter   Epistaxis       Final disposition: discharge    Rebecca Stahl DO  EM Physician  [facility] Rebecca Tejeda DO  04/04/25 [id number]

## 2025-04-04 NOTE — DISCHARGE INSTRUCTIONS
You were seen in the emergency department today with a chief complaint of a bloody nose after you broke your nose earlier this week.  As we discussed there are many bones in your nose that are in the wrong place.  This is likely what is causing your frequent nosebleeds.  I suspect that when you move the bones it starts to bleed again.  It is very important that you take the prescribed antibiotics twice a day over the next 10 days to help prevent infection as there is a possibility that there are bones poking through the skin which is very dangerous for infection.  If you find that the bleeding happens again, you can use the nose clamp in place over your nose for 15 minutes.  If this does not help please come back to the ER.  If you develop any fevers or chills or bad headaches, please go back to the ER.  This could be a sign of worsening infection in your nose.  Otherwise do not touch or move your nose as this is likely a precipitating cause of some of the bleeding.    The ear nose throat clinic will contact you and get an appointment scheduled next week

## 2025-04-05 ENCOUNTER — HOSPITAL ENCOUNTER (EMERGENCY)
Facility: CLINIC | Age: 75
Discharge: ANOTHER HOSPITAL | End: 2025-04-05
Attending: EMERGENCY MEDICINE | Admitting: EMERGENCY MEDICINE
Payer: COMMERCIAL

## 2025-04-05 ENCOUNTER — HOSPITAL ENCOUNTER (INPATIENT)
Facility: CLINIC | Age: 75
DRG: 155 | End: 2025-04-05
Attending: FAMILY MEDICINE | Admitting: STUDENT IN AN ORGANIZED HEALTH CARE EDUCATION/TRAINING PROGRAM
Payer: COMMERCIAL

## 2025-04-05 VITALS
SYSTOLIC BLOOD PRESSURE: 132 MMHG | BODY MASS INDEX: 35.36 KG/M2 | DIASTOLIC BLOOD PRESSURE: 76 MMHG | OXYGEN SATURATION: 94 % | HEIGHT: 66 IN | TEMPERATURE: 97.8 F | HEART RATE: 94 BPM | RESPIRATION RATE: 13 BRPM | WEIGHT: 220 LBS

## 2025-04-05 DIAGNOSIS — Z86.59 HISTORY OF DEMENTIA: ICD-10-CM

## 2025-04-05 DIAGNOSIS — N13.8 BENIGN PROSTATIC HYPERPLASIA WITH URINARY OBSTRUCTION: Primary | ICD-10-CM

## 2025-04-05 DIAGNOSIS — N40.1 BENIGN PROSTATIC HYPERPLASIA WITH URINARY OBSTRUCTION: Primary | ICD-10-CM

## 2025-04-05 DIAGNOSIS — R04.0 EPISTAXIS: ICD-10-CM

## 2025-04-05 DIAGNOSIS — G20.A1 PARKINSON'S DISEASE, UNSPECIFIED WHETHER DYSKINESIA PRESENT, UNSPECIFIED WHETHER MANIFESTATIONS FLUCTUATE (H): ICD-10-CM

## 2025-04-05 DIAGNOSIS — I10 BENIGN ESSENTIAL HYPERTENSION: ICD-10-CM

## 2025-04-05 DIAGNOSIS — G31.83 SEVERE LEWY BODY DEMENTIA WITH ANXIETY (H): Chronic | ICD-10-CM

## 2025-04-05 DIAGNOSIS — F02.C4 SEVERE LEWY BODY DEMENTIA WITH ANXIETY (H): Chronic | ICD-10-CM

## 2025-04-05 DIAGNOSIS — R45.1 AGITATION: ICD-10-CM

## 2025-04-05 DIAGNOSIS — Z91.81 PERSONAL HISTORY OF FALL: ICD-10-CM

## 2025-04-05 DIAGNOSIS — R04.0 EPISTAXIS DUE TO TRAUMA: ICD-10-CM

## 2025-04-05 DIAGNOSIS — S02.2XXS CLOSED FRACTURE OF NASAL BONE, SEQUELA: ICD-10-CM

## 2025-04-05 DIAGNOSIS — F41.1 GENERALIZED ANXIETY DISORDER: ICD-10-CM

## 2025-04-05 LAB
ABO + RH BLD: NORMAL
ANION GAP SERPL CALCULATED.3IONS-SCNC: 12 MMOL/L (ref 7–15)
APTT PPP: 24 SECONDS (ref 22–38)
BASOPHILS # BLD AUTO: 0 10E3/UL (ref 0–0.2)
BASOPHILS # BLD AUTO: 0 10E3/UL (ref 0–0.2)
BASOPHILS NFR BLD AUTO: 0 %
BASOPHILS NFR BLD AUTO: 0 %
BLD GP AB SCN SERPL QL: NEGATIVE
BUN SERPL-MCNC: 27 MG/DL (ref 8–23)
CALCIUM SERPL-MCNC: 9.2 MG/DL (ref 8.8–10.4)
CHLORIDE SERPL-SCNC: 107 MMOL/L (ref 98–107)
CREAT SERPL-MCNC: 0.87 MG/DL (ref 0.67–1.17)
EGFRCR SERPLBLD CKD-EPI 2021: 89 ML/MIN/1.73M2
EOSINOPHIL # BLD AUTO: 0 10E3/UL (ref 0–0.7)
EOSINOPHIL # BLD AUTO: 0.1 10E3/UL (ref 0–0.7)
EOSINOPHIL NFR BLD AUTO: 0 %
EOSINOPHIL NFR BLD AUTO: 1 %
ERYTHROCYTE [DISTWIDTH] IN BLOOD BY AUTOMATED COUNT: 13.3 % (ref 10–15)
ERYTHROCYTE [DISTWIDTH] IN BLOOD BY AUTOMATED COUNT: 14.3 % (ref 10–15)
FIBRINOGEN PPP-MCNC: 306 MG/DL (ref 170–510)
GLUCOSE BLDC GLUCOMTR-MCNC: 130 MG/DL (ref 70–99)
GLUCOSE BLDC GLUCOMTR-MCNC: 170 MG/DL (ref 70–99)
GLUCOSE SERPL-MCNC: 206 MG/DL (ref 70–99)
HCO3 SERPL-SCNC: 22 MMOL/L (ref 22–29)
HCT VFR BLD AUTO: 37.5 % (ref 40–53)
HCT VFR BLD AUTO: 38.3 % (ref 40–53)
HGB BLD-MCNC: 12.4 G/DL (ref 13.3–17.7)
HGB BLD-MCNC: 12.4 G/DL (ref 13.3–17.7)
IMM GRANULOCYTES # BLD: 0 10E3/UL
IMM GRANULOCYTES # BLD: 0.1 10E3/UL
IMM GRANULOCYTES NFR BLD: 0 %
IMM GRANULOCYTES NFR BLD: 1 %
INR PPP: 1.14 (ref 0.85–1.15)
LYMPHOCYTES # BLD AUTO: 0.9 10E3/UL (ref 0.8–5.3)
LYMPHOCYTES # BLD AUTO: 0.9 10E3/UL (ref 0.8–5.3)
LYMPHOCYTES NFR BLD AUTO: 8 %
LYMPHOCYTES NFR BLD AUTO: 9 %
MCH RBC QN AUTO: 30.9 PG (ref 26.5–33)
MCH RBC QN AUTO: 30.9 PG (ref 26.5–33)
MCHC RBC AUTO-ENTMCNC: 32.4 G/DL (ref 31.5–36.5)
MCHC RBC AUTO-ENTMCNC: 33.1 G/DL (ref 31.5–36.5)
MCV RBC AUTO: 94 FL (ref 78–100)
MCV RBC AUTO: 96 FL (ref 78–100)
MONOCYTES # BLD AUTO: 0.5 10E3/UL (ref 0–1.3)
MONOCYTES # BLD AUTO: 0.6 10E3/UL (ref 0–1.3)
MONOCYTES NFR BLD AUTO: 4 %
MONOCYTES NFR BLD AUTO: 6 %
NEUTROPHILS # BLD AUTO: 10.2 10E3/UL (ref 1.6–8.3)
NEUTROPHILS # BLD AUTO: 9 10E3/UL (ref 1.6–8.3)
NEUTROPHILS NFR BLD AUTO: 85 %
NEUTROPHILS NFR BLD AUTO: 87 %
NRBC # BLD AUTO: 0 10E3/UL
NRBC # BLD AUTO: 0 10E3/UL
NRBC BLD AUTO-RTO: 0 /100
NRBC BLD AUTO-RTO: 0 /100
PLAT MORPH BLD: NORMAL
PLATELET # BLD AUTO: 175 10E3/UL (ref 150–450)
PLATELET # BLD AUTO: 185 10E3/UL (ref 150–450)
POTASSIUM SERPL-SCNC: 4.4 MMOL/L (ref 3.4–5.3)
RBC # BLD AUTO: 4.01 10E6/UL (ref 4.4–5.9)
RBC # BLD AUTO: 4.01 10E6/UL (ref 4.4–5.9)
RBC MORPH BLD: NORMAL
SODIUM SERPL-SCNC: 141 MMOL/L (ref 135–145)
SPECIMEN EXP DATE BLD: NORMAL
WBC # BLD AUTO: 10.6 10E3/UL (ref 4–11)
WBC # BLD AUTO: 11.8 10E3/UL (ref 4–11)

## 2025-04-05 PROCEDURE — 250N000013 HC RX MED GY IP 250 OP 250 PS 637: Performed by: EMERGENCY MEDICINE

## 2025-04-05 PROCEDURE — 99418 PROLNG IP/OBS E/M EA 15 MIN: CPT | Mod: FS

## 2025-04-05 PROCEDURE — 258N000003 HC RX IP 258 OP 636: Performed by: EMERGENCY MEDICINE

## 2025-04-05 PROCEDURE — 96375 TX/PRO/DX INJ NEW DRUG ADDON: CPT | Performed by: FAMILY MEDICINE

## 2025-04-05 PROCEDURE — 80048 BASIC METABOLIC PNL TOTAL CA: CPT | Performed by: EMERGENCY MEDICINE

## 2025-04-05 PROCEDURE — 86900 BLOOD TYPING SEROLOGIC ABO: CPT | Performed by: EMERGENCY MEDICINE

## 2025-04-05 PROCEDURE — 120N000003 HC R&B IMCU UMMC

## 2025-04-05 PROCEDURE — 30901 CONTROL OF NOSEBLEED: CPT

## 2025-04-05 PROCEDURE — 85384 FIBRINOGEN ACTIVITY: CPT | Performed by: EMERGENCY MEDICINE

## 2025-04-05 PROCEDURE — 85004 AUTOMATED DIFF WBC COUNT: CPT | Performed by: EMERGENCY MEDICINE

## 2025-04-05 PROCEDURE — 99285 EMERGENCY DEPT VISIT HI MDM: CPT | Mod: 25 | Performed by: EMERGENCY MEDICINE

## 2025-04-05 PROCEDURE — 250N000009 HC RX 250: Performed by: FAMILY MEDICINE

## 2025-04-05 PROCEDURE — 99285 EMERGENCY DEPT VISIT HI MDM: CPT | Mod: 25

## 2025-04-05 PROCEDURE — 96361 HYDRATE IV INFUSION ADD-ON: CPT

## 2025-04-05 PROCEDURE — 258N000003 HC RX IP 258 OP 636

## 2025-04-05 PROCEDURE — 250N000011 HC RX IP 250 OP 636: Performed by: FAMILY MEDICINE

## 2025-04-05 PROCEDURE — 99285 EMERGENCY DEPT VISIT HI MDM: CPT | Mod: 25 | Performed by: FAMILY MEDICINE

## 2025-04-05 PROCEDURE — 250N000009 HC RX 250

## 2025-04-05 PROCEDURE — 82962 GLUCOSE BLOOD TEST: CPT

## 2025-04-05 PROCEDURE — 36415 COLL VENOUS BLD VENIPUNCTURE: CPT | Performed by: FAMILY MEDICINE

## 2025-04-05 PROCEDURE — 85004 AUTOMATED DIFF WBC COUNT: CPT | Performed by: FAMILY MEDICINE

## 2025-04-05 PROCEDURE — 30901 CONTROL OF NOSEBLEED: CPT | Performed by: EMERGENCY MEDICINE

## 2025-04-05 PROCEDURE — 86850 RBC ANTIBODY SCREEN: CPT | Performed by: EMERGENCY MEDICINE

## 2025-04-05 PROCEDURE — 85610 PROTHROMBIN TIME: CPT | Performed by: EMERGENCY MEDICINE

## 2025-04-05 PROCEDURE — 85730 THROMBOPLASTIN TIME PARTIAL: CPT | Performed by: EMERGENCY MEDICINE

## 2025-04-05 PROCEDURE — 250N000013 HC RX MED GY IP 250 OP 250 PS 637

## 2025-04-05 PROCEDURE — 96376 TX/PRO/DX INJ SAME DRUG ADON: CPT | Performed by: FAMILY MEDICINE

## 2025-04-05 PROCEDURE — 96374 THER/PROPH/DIAG INJ IV PUSH: CPT | Performed by: FAMILY MEDICINE

## 2025-04-05 PROCEDURE — 99223 1ST HOSP IP/OBS HIGH 75: CPT | Mod: FS

## 2025-04-05 PROCEDURE — 82435 ASSAY OF BLOOD CHLORIDE: CPT | Performed by: EMERGENCY MEDICINE

## 2025-04-05 PROCEDURE — 85014 HEMATOCRIT: CPT | Performed by: EMERGENCY MEDICINE

## 2025-04-05 PROCEDURE — 250N000011 HC RX IP 250 OP 636

## 2025-04-05 PROCEDURE — 99291 CRITICAL CARE FIRST HOUR: CPT | Performed by: FAMILY MEDICINE

## 2025-04-05 PROCEDURE — 36415 COLL VENOUS BLD VENIPUNCTURE: CPT | Performed by: EMERGENCY MEDICINE

## 2025-04-05 PROCEDURE — 82565 ASSAY OF CREATININE: CPT | Performed by: EMERGENCY MEDICINE

## 2025-04-05 PROCEDURE — 99207 PR APP CREDIT; MD BILLING SHARED VISIT: CPT | Mod: FS | Performed by: STUDENT IN AN ORGANIZED HEALTH CARE EDUCATION/TRAINING PROGRAM

## 2025-04-05 PROCEDURE — 96360 HYDRATION IV INFUSION INIT: CPT

## 2025-04-05 RX ORDER — ACETAMINOPHEN 325 MG/1
650 TABLET ORAL EVERY 4 HOURS PRN
Status: DISCONTINUED | OUTPATIENT
Start: 2025-04-05 | End: 2025-04-12 | Stop reason: HOSPADM

## 2025-04-05 RX ORDER — HALOPERIDOL 5 MG/ML
2 INJECTION INTRAMUSCULAR ONCE
Status: COMPLETED | OUTPATIENT
Start: 2025-04-05 | End: 2025-04-05

## 2025-04-05 RX ORDER — DEXTROSE MONOHYDRATE 25 G/50ML
25-50 INJECTION, SOLUTION INTRAVENOUS
Status: DISCONTINUED | OUTPATIENT
Start: 2025-04-05 | End: 2025-04-05

## 2025-04-05 RX ORDER — CARVEDILOL 3.12 MG/1
3.12 TABLET ORAL 2 TIMES DAILY WITH MEALS
Status: DISCONTINUED | OUTPATIENT
Start: 2025-04-05 | End: 2025-04-12 | Stop reason: HOSPADM

## 2025-04-05 RX ORDER — NICOTINE POLACRILEX 4 MG
15-30 LOZENGE BUCCAL
Status: DISCONTINUED | OUTPATIENT
Start: 2025-04-05 | End: 2025-04-12 | Stop reason: HOSPADM

## 2025-04-05 RX ORDER — OXYMETAZOLINE HYDROCHLORIDE 0.05 G/100ML
2 SPRAY NASAL
Status: COMPLETED | OUTPATIENT
Start: 2025-04-05 | End: 2025-04-07

## 2025-04-05 RX ORDER — LISINOPRIL 40 MG/1
40 TABLET ORAL DAILY
Status: DISCONTINUED | OUTPATIENT
Start: 2025-04-05 | End: 2025-04-05

## 2025-04-05 RX ORDER — METOPROLOL TARTRATE 1 MG/ML
2.5 INJECTION, SOLUTION INTRAVENOUS ONCE
Status: COMPLETED | OUTPATIENT
Start: 2025-04-05 | End: 2025-04-05

## 2025-04-05 RX ORDER — LIDOCAINE 40 MG/G
CREAM TOPICAL
Status: DISCONTINUED | OUTPATIENT
Start: 2025-04-05 | End: 2025-04-12 | Stop reason: HOSPADM

## 2025-04-05 RX ORDER — NICOTINE POLACRILEX 4 MG
15-30 LOZENGE BUCCAL
Status: DISCONTINUED | OUTPATIENT
Start: 2025-04-05 | End: 2025-04-05

## 2025-04-05 RX ORDER — AMOXICILLIN AND CLAVULANATE POTASSIUM 500; 125 MG/1; MG/1
1 TABLET, FILM COATED ORAL EVERY 8 HOURS SCHEDULED
Status: DISCONTINUED | OUTPATIENT
Start: 2025-04-05 | End: 2025-04-05

## 2025-04-05 RX ORDER — ONDANSETRON 2 MG/ML
4 INJECTION INTRAMUSCULAR; INTRAVENOUS EVERY 6 HOURS PRN
Status: DISCONTINUED | OUTPATIENT
Start: 2025-04-05 | End: 2025-04-12 | Stop reason: HOSPADM

## 2025-04-05 RX ORDER — AMPICILLIN AND SULBACTAM 2; 1 G/1; G/1
3 INJECTION, POWDER, FOR SOLUTION INTRAMUSCULAR; INTRAVENOUS EVERY 6 HOURS
Status: DISCONTINUED | OUTPATIENT
Start: 2025-04-05 | End: 2025-04-09

## 2025-04-05 RX ORDER — ACETAMINOPHEN 650 MG/1
650 SUPPOSITORY RECTAL EVERY 4 HOURS PRN
Status: DISCONTINUED | OUTPATIENT
Start: 2025-04-05 | End: 2025-04-12 | Stop reason: HOSPADM

## 2025-04-05 RX ORDER — DEXMEDETOMIDINE HYDROCHLORIDE 4 UG/ML
.2-.7 INJECTION, SOLUTION INTRAVENOUS CONTINUOUS
Status: DISCONTINUED | OUTPATIENT
Start: 2025-04-05 | End: 2025-04-09

## 2025-04-05 RX ORDER — HALOPERIDOL 5 MG/ML
3 INJECTION INTRAMUSCULAR ONCE
Status: COMPLETED | OUTPATIENT
Start: 2025-04-05 | End: 2025-04-05

## 2025-04-05 RX ORDER — BUSPIRONE HYDROCHLORIDE 5 MG/1
5 TABLET ORAL ONCE
Status: COMPLETED | OUTPATIENT
Start: 2025-04-05 | End: 2025-04-05

## 2025-04-05 RX ORDER — ONDANSETRON 4 MG/1
4 TABLET, ORALLY DISINTEGRATING ORAL EVERY 6 HOURS PRN
Status: DISCONTINUED | OUTPATIENT
Start: 2025-04-05 | End: 2025-04-12 | Stop reason: HOSPADM

## 2025-04-05 RX ORDER — ROSUVASTATIN CALCIUM 20 MG/1
20 TABLET, COATED ORAL DAILY
Status: DISCONTINUED | OUTPATIENT
Start: 2025-04-06 | End: 2025-04-12 | Stop reason: HOSPADM

## 2025-04-05 RX ORDER — TAMSULOSIN HYDROCHLORIDE 0.4 MG/1
0.4 CAPSULE ORAL DAILY
Status: DISCONTINUED | OUTPATIENT
Start: 2025-04-06 | End: 2025-04-12 | Stop reason: HOSPADM

## 2025-04-05 RX ORDER — CARBIDOPA AND LEVODOPA 25; 100 MG/1; MG/1
1 TABLET, EXTENDED RELEASE ORAL 3 TIMES DAILY
Status: DISCONTINUED | OUTPATIENT
Start: 2025-04-05 | End: 2025-04-12 | Stop reason: HOSPADM

## 2025-04-05 RX ORDER — GLIPIZIDE 2.5 MG/1
2.5 TABLET, EXTENDED RELEASE ORAL DAILY
Status: DISCONTINUED | OUTPATIENT
Start: 2025-04-05 | End: 2025-04-05 | Stop reason: HOSPADM

## 2025-04-05 RX ORDER — FUROSEMIDE 20 MG/1
20 TABLET ORAL DAILY
Status: DISCONTINUED | OUTPATIENT
Start: 2025-04-05 | End: 2025-04-05 | Stop reason: HOSPADM

## 2025-04-05 RX ORDER — DEXTROSE MONOHYDRATE 25 G/50ML
25-50 INJECTION, SOLUTION INTRAVENOUS
Status: DISCONTINUED | OUTPATIENT
Start: 2025-04-05 | End: 2025-04-12 | Stop reason: HOSPADM

## 2025-04-05 RX ORDER — AMOXICILLIN 250 MG
1 CAPSULE ORAL 2 TIMES DAILY PRN
Status: DISCONTINUED | OUTPATIENT
Start: 2025-04-05 | End: 2025-04-12 | Stop reason: HOSPADM

## 2025-04-05 RX ORDER — MORPHINE SULFATE 2 MG/ML
1 INJECTION, SOLUTION INTRAMUSCULAR; INTRAVENOUS ONCE
Status: COMPLETED | OUTPATIENT
Start: 2025-04-05 | End: 2025-04-05

## 2025-04-05 RX ORDER — MIRABEGRON 50 MG/1
50 TABLET, FILM COATED, EXTENDED RELEASE ORAL DAILY
Status: DISCONTINUED | OUTPATIENT
Start: 2025-04-06 | End: 2025-04-12 | Stop reason: HOSPADM

## 2025-04-05 RX ORDER — TAMSULOSIN HYDROCHLORIDE 0.4 MG/1
0.4 CAPSULE ORAL DAILY
Status: DISCONTINUED | OUTPATIENT
Start: 2025-04-05 | End: 2025-04-05 | Stop reason: HOSPADM

## 2025-04-05 RX ORDER — AMOXICILLIN 250 MG
2 CAPSULE ORAL 2 TIMES DAILY PRN
Status: DISCONTINUED | OUTPATIENT
Start: 2025-04-05 | End: 2025-04-12 | Stop reason: HOSPADM

## 2025-04-05 RX ORDER — OXYMETAZOLINE HYDROCHLORIDE 0.05 G/100ML
2 SPRAY NASAL
Status: DISCONTINUED | OUTPATIENT
Start: 2025-04-05 | End: 2025-04-05

## 2025-04-05 RX ORDER — LORAZEPAM 2 MG/ML
0.5 INJECTION INTRAMUSCULAR ONCE
Status: COMPLETED | OUTPATIENT
Start: 2025-04-05 | End: 2025-04-05

## 2025-04-05 RX ORDER — PETROLATUM,WHITE
OINTMENT IN PACKET (GRAM) TOPICAL AT BEDTIME
Status: DISCONTINUED | OUTPATIENT
Start: 2025-04-05 | End: 2025-04-12 | Stop reason: HOSPADM

## 2025-04-05 RX ORDER — CALCIUM CARBONATE 500 MG/1
1000 TABLET, CHEWABLE ORAL 4 TIMES DAILY PRN
Status: DISCONTINUED | OUTPATIENT
Start: 2025-04-05 | End: 2025-04-12 | Stop reason: HOSPADM

## 2025-04-05 RX ORDER — FUROSEMIDE 20 MG/1
20 TABLET ORAL DAILY
Status: DISCONTINUED | OUTPATIENT
Start: 2025-04-05 | End: 2025-04-06

## 2025-04-05 RX ADMIN — HALOPERIDOL LACTATE 3 MG: 5 INJECTION, SOLUTION INTRAMUSCULAR at 15:04

## 2025-04-05 RX ADMIN — SALINE NASAL SPRAY 1 SPRAY: 1.5 SOLUTION NASAL at 18:31

## 2025-04-05 RX ADMIN — DEXMEDETOMIDINE HYDROCHLORIDE 0.2 MCG/KG/HR: 4 INJECTION, SOLUTION INTRAVENOUS at 16:42

## 2025-04-05 RX ADMIN — METOPROLOL TARTRATE 2.5 MG: 5 INJECTION INTRAVENOUS at 20:33

## 2025-04-05 RX ADMIN — SODIUM CHLORIDE 1000 ML: 9 INJECTION, SOLUTION INTRAVENOUS at 07:45

## 2025-04-05 RX ADMIN — TAMSULOSIN HYDROCHLORIDE 0.4 MG: 0.4 CAPSULE ORAL at 11:29

## 2025-04-05 RX ADMIN — AMPICILLIN SODIUM AND SULBACTAM SODIUM 3 G: 2; 1 INJECTION, POWDER, FOR SOLUTION INTRAMUSCULAR; INTRAVENOUS at 22:03

## 2025-04-05 RX ADMIN — AMOXICILLIN AND CLAVULANATE POTASSIUM 1 TABLET: 875; 125 TABLET, COATED ORAL at 11:29

## 2025-04-05 RX ADMIN — BUSPIRONE HYDROCHLORIDE 5 MG: 5 TABLET ORAL at 06:22

## 2025-04-05 RX ADMIN — LORAZEPAM 0.5 MG: 2 INJECTION INTRAMUSCULAR; INTRAVENOUS at 14:32

## 2025-04-05 RX ADMIN — Medication: at 22:02

## 2025-04-05 RX ADMIN — SODIUM CHLORIDE 1000 ML: 9 INJECTION, SOLUTION INTRAVENOUS at 20:33

## 2025-04-05 RX ADMIN — SALINE NASAL SPRAY 1 SPRAY: 1.5 SOLUTION NASAL at 20:33

## 2025-04-05 RX ADMIN — HALOPERIDOL LACTATE 2 MG: 5 INJECTION, SOLUTION INTRAMUSCULAR at 14:33

## 2025-04-05 ASSESSMENT — ACTIVITIES OF DAILY LIVING (ADL)
ADLS_ACUITY_SCORE: 41

## 2025-04-05 ASSESSMENT — COLUMBIA-SUICIDE SEVERITY RATING SCALE - C-SSRS
1. IN THE PAST MONTH, HAVE YOU WISHED YOU WERE DEAD OR WISHED YOU COULD GO TO SLEEP AND NOT WAKE UP?: NO
2. HAVE YOU ACTUALLY HAD ANY THOUGHTS OF KILLING YOURSELF IN THE PAST MONTH?: NO
2. HAVE YOU ACTUALLY HAD ANY THOUGHTS OF KILLING YOURSELF IN THE PAST MONTH?: NO
1. IN THE PAST MONTH, HAVE YOU WISHED YOU WERE DEAD OR WISHED YOU COULD GO TO SLEEP AND NOT WAKE UP?: NO
6. HAVE YOU EVER DONE ANYTHING, STARTED TO DO ANYTHING, OR PREPARED TO DO ANYTHING TO END YOUR LIFE?: NO
6. HAVE YOU EVER DONE ANYTHING, STARTED TO DO ANYTHING, OR PREPARED TO DO ANYTHING TO END YOUR LIFE?: NO

## 2025-04-05 NOTE — ED PROVIDER NOTES
[place] EMERGENCY DEPARTMENT ([facility])    4/05/25       ED PROVIDER NOTE   History     Chief Complaint   Patient presents with    Fall     HPI  Fco Arroyo is a 75 year old male with a history of type 2 diabetes, dementia ,CRVO, and anxiety who presents to the ED for evaluation a nose fracture secondary to fall earlier this week. Per chart review, the patient does not take medication for anticoagulation.  Patient evaluated at [facility] ER yesterday for nosebleed that started and then today again.  Patient now has 2 Rhino Rocket send still bleeding and talk to ENT at [facility] which recommended neuro IR for possible embolization neuro IR here at [facility] recommended evaluation by ENT.  Patient to transfer down with family by car here patient does have history of dementia as noted.  Patient slightly agitated but otherwise baseline for him.  No other injuries reported.  Still dripping slightly.    Per chart review, patient was brought to the ED on 03/31/2025 for evaluation of a fall. Patient was diagnosed with a nose fracture secondary to fall. Patient is not on anticoagulation.     EXAM: CT FACIAL BONES WITHOUT CONTRAST  LOCATION: [facility]  DATE: 03/31/2025  IMPRESSION:   1.  Acute fractures of the nasal bones and bony nasal septum.  2.  Moderate hematoma overlying the nasal bones and medial left periorbital soft tissues.  3.  Periapical lucencies and bony defects of the bilateral medial and lateral maxillary incisors. These are favored to represent dental disease rather than acute posttraumatic change. Nonetheless, clinical correlation recommended. Numerous additional   sites of dental disease.    Past Medical History  Past Medical History:   Diagnosis Date    DIABETES MELLITUS TYPE II-UNCOMPL 8/17/2005    Essential hypertension, benign     on meds, Stress Echo 1991 - normal, but suboptimal exertion    Other and unspecified hyperlipidemia     Other isolated  or specific phobias     atenolol helps, wants open MRI    Other specified causes of urethral stricture     cystoscopy 1997 some hematuria in past - benign    Squamous cell carcinoma      Past Surgical History:   Procedure Laterality Date    SURGICAL HISTORY OF - 1998    open cholecystectomy for gallstones s/p ERCP    SURGICAL HISTORY OF - 1997    cystoscopy    SURGICAL HISTORY OF - 11/1997    Urethral stricture     amoxicillin-clavulanate (AUGMENTIN) 875-125 MG tablet  ASPIRIN 81 MG OR TABS  blood glucose (ACCU-CHEK SMARTVIEW) test strip  blood glucose (ACCU-CHEK SMARTVIEW) test strip  blood glucose monitoring (ACCU-CHEK FASTCLIX) lancets  busPIRone (BUSPAR) 5 MG tablet  calcipotriene (DOVONEX) 0.005 % external cream  cinnamon 500 MG CAPS  fluorouracil (EFUDEX) 5 % external cream  fluticasone (FLONASE) 50 MCG/ACT nasal spray  furosemide (LASIX) 20 MG tablet  glipiZIDE (GLUCOTROL XL) 2.5 MG 24 hr tablet  lisinopril (ZESTRIL) 40 MG tablet  metFORMIN (GLUCOPHAGE) 1000 MG tablet  Misc Natural Products (TOTAL MEMORY & FOCUS FORMULA) TABS  Red Yeast Rice Extract 600 MG TABS  rosuvastatin (CRESTOR) 20 MG tablet  sertraline (ZOLOFT) 50 MG tablet  tamsulosin (FLOMAX) 0.4 MG capsule  VITAMIN C 500 MG PO TABS  VITAMIN E      Allergies   Allergen Reactions    Lisinopril Cough and Other (See Comments)    Losartan Cough and Other (See Comments)    Morphine GI Disturbance    Quetiapine Other (See Comments)     Other Reaction(s): Behavioral Disturbances      Patient got more agitated after taking seroquel at [facility].    Patient got more agitated after taking seroquel at [facility].    Terbinafine Other (See Comments)     hematuria     Family History  Family History   Problem Relation Age of Onset    Diabetes Mother     Hypertension Mother     Hypertension Father     Heart Disease Brother         MI x2, CABG, 1st MI at 49-51 yo.     Hypertension Brother     C.A.D. Brother     Alzheimer Disease Brother     Diabetes  Sister          maybe    Coronary Artery Disease Brother     Heart Disease Brother     Melanoma No family hx of      Social History   Social History     Tobacco Use    Smoking status: Former     Types: Cigarettes    Smokeless tobacco: Never   Substance Use Topics    Alcohol use: Yes     Comment: rare    Drug use: No      A medically appropriate review of systems was performed with pertinent positives and negatives noted in the HPI, and all other systems negative.    Physical Exam   BP: 123/75  Pulse: 93  Temp: 97.2  F (36.2  C)  Resp: 18  SpO2: 95 %  Physical Exam  Vitals and nursing note reviewed.   Constitutional:       General: He is in acute distress.      Appearance: He is well-developed. He is not toxic-appearing or diaphoretic.      Comments: Patient with family bilateral Rhino Rocket's are in.  Stasis of bleeding with only dripping noted.  Airway intact facial bruising from previous trauma identified   HENT:      Head: Normocephalic.      Nose: No congestion.      Comments:   Patient with bruising across the nasal bridge Rhino Rocket send from bilateral nosebleeds     Mouth/Throat:      Mouth: Mucous membranes are moist.      Pharynx: Oropharynx is clear.   Eyes:      General: No scleral icterus.     Extraocular Movements: Extraocular movements intact.      Conjunctiva/sclera: Conjunctivae normal.      Pupils: Pupils are equal, round, and reactive to light.   Cardiovascular:      Rate and Rhythm: Normal rate.   Pulmonary:      Effort: Pulmonary effort is normal.   Abdominal:      General: Abdomen is flat.   Musculoskeletal:         General: Swelling and tenderness present. No deformity. Normal range of motion.      Cervical back: Normal range of motion and neck supple.   Lymphadenopathy:      Cervical: No cervical adenopathy.   Skin:     General: Skin is warm and dry.      Capillary Refill: Capillary refill takes less than 2 seconds.      Findings: Bruising present. No rash.   Neurological:      General: No  focal deficit present.      Mental Status: He is alert.      Cranial Nerves: No cranial nerve deficit.      Sensory: No sensory deficit.      Coordination: Coordination normal.      Comments: Baseline dementia noted per family   Psychiatric:      Comments: Patient somewhat agitated here in the ER baseline dementia           ED Course, Procedures, & Data        We repeated CBC along with type and screen.    Patient uncooperative I talked to ENT prior to coming in and also consulted them.    Patient became more agitated waiting for ENT as patient did receive 1/2 mg Ativan IV patient now more agitated wanting to leave on the and redirectable at this point we did give 2 mg of Haldol IV hoping this will help calm him so ENT who is here can continue there treatment of the bilateral nosebleeds.  Family agrees.    In the ER patient continue evaluated patient with 1/2 mg Ativan did not change his resistant behavior.  Therefore we initially did after consultation with pharmacy to give 2 mg of Haldol IV and then another 3 mg for total 5 mg of Haldol IV.    A code 21 been called patient was in soft restraints as far as wrist and chest only.    ENT was here attempting to perform further procedure feel this point adequate stasis with the bilateral Rhino Rocket in.  Feel this point patient needs to be more sedated so she can remove these and further inspect and placed potential dissolvable packings in.    In the ER as noted patient with communication with family still remains somewhat agitated though mildly sedate but still not cooperative enough to do procedure.  I talked to medicine and discussed with pharmacy will start Precedex drip on the patient plan for C admission overnight watching him for any recurrent bleeding monitor his behavior so ENT can get a better evaluation for bleeding if needed IR can be contacted also.    We will recheck blood sugar on this at this point patient does have allergies to Seroquel so we did not  want to give Zyprexa.  The Haldol did not seem to make a significant sedate difference but did calm him slightly.  Precedex being initiated with cardiac and oximetry monitoring. Admit C   Medicine at bedside in ED also.    Sign out to pm MD for ongoing monitoring of sedation for agitation    Procedures                 Results for orders placed or performed during the hospital encounter of 04/05/25   CBC with platelets and differential     Status: Abnormal   Result Value Ref Range    WBC Count 10.6 4.0 - 11.0 10e3/uL    RBC Count 4.01 (L) 4.40 - 5.90 10e6/uL    Hemoglobin 12.4 (L) 13.3 - 17.7 g/dL    Hematocrit 38.3 (L) 40.0 - 53.0 %    MCV 96 78 - 100 fL    MCH 30.9 26.5 - 33.0 pg    MCHC 32.4 31.5 - 36.5 g/dL    RDW 14.3 10.0 - 15.0 %    Platelet Count 175 150 - 450 10e3/uL    % Neutrophils 85 %    % Lymphocytes 9 %    % Monocytes 6 %    % Eosinophils 0 %    % Basophils 0 %    % Immature Granulocytes 1 %    NRBCs per 100 WBC 0 <1 /100    Absolute Neutrophils 9.0 (H) 1.6 - 8.3 10e3/uL    Absolute Lymphocytes 0.9 0.8 - 5.3 10e3/uL    Absolute Monocytes 0.6 0.0 - 1.3 10e3/uL    Absolute Eosinophils 0.0 0.0 - 0.7 10e3/uL    Absolute Basophils 0.0 0.0 - 0.2 10e3/uL    Absolute Immature Granulocytes 0.1 <=0.4 10e3/uL    Absolute NRBCs 0.0 10e3/uL   RBC and Platelet Morphology     Status: None   Result Value Ref Range    RBC Morphology Confirmed RBC Indices     Platelet Assessment  Automated Count Confirmed. Platelet morphology is normal.     Automated Count Confirmed. Platelet morphology is normal.   CBC with platelets differential     Status: Abnormal    Narrative    The following orders were created for panel order CBC with platelets differential.  Procedure                               Abnormality         Status                     ---------                               -----------         ------                     CBC with platelets and ...[[id number]]  Abnormal            Final result               RBC and  Platelet Morpho...[[id number]]                      Final result                 Please view results for these tests on the individual orders.   ABO/Rh type and screen *Canceled*     Status: None ()    Narrative    The following orders were created for panel order ABO/Rh type and screen.  Procedure                               Abnormality         Status                     ---------                               -----------         ------                     Adult Type and Screen[[id number]]                                                        Please view results for these tests on the individual orders.   Results for orders placed or performed during the hospital encounter of 04/05/25   Basic metabolic panel     Status: Abnormal   Result Value Ref Range    Sodium 141 135 - 145 mmol/L    Potassium 4.4 3.4 - 5.3 mmol/L    Chloride 107 98 - 107 mmol/L    Carbon Dioxide (CO2) 22 22 - 29 mmol/L    Anion Gap 12 7 - 15 mmol/L    Urea Nitrogen 27.0 (H) 8.0 - 23.0 mg/dL    Creatinine 0.87 0.67 - 1.17 mg/dL    GFR Estimate 89 >60 mL/min/1.73m2    Calcium 9.2 8.8 - 10.4 mg/dL    Glucose 206 (H) 70 - 99 mg/dL   INR     Status: Normal   Result Value Ref Range    INR 1.14 0.85 - 1.15   Partial thromboplastin time     Status: Normal   Result Value Ref Range    aPTT 24 22 - 38 Seconds   Fibrinogen activity     Status: Normal   Result Value Ref Range    Fibrinogen Activity 306 170 - 510 mg/dL   CBC with platelets and differential     Status: Abnormal   Result Value Ref Range    WBC Count 11.8 (H) 4.0 - 11.0 10e3/uL    RBC Count 4.01 (L) 4.40 - 5.90 10e6/uL    Hemoglobin 12.4 (L) 13.3 - 17.7 g/dL    Hematocrit 37.5 (L) 40.0 - 53.0 %    MCV 94 78 - 100 fL    MCH 30.9 26.5 - 33.0 pg    MCHC 33.1 31.5 - 36.5 g/dL    RDW 13.3 10.0 - 15.0 %    Platelet Count 185 150 - 450 10e3/uL    % Neutrophils 87 %    % Lymphocytes 8 %    % Monocytes 4 %    % Eosinophils 1 %    % Basophils 0 %    % Immature Granulocytes 0 %    NRBCs per 100 WBC 0  <1 /100    Absolute Neutrophils 10.2 (H) 1.6 - 8.3 10e3/uL    Absolute Lymphocytes 0.9 0.8 - 5.3 10e3/uL    Absolute Monocytes 0.5 0.0 - 1.3 10e3/uL    Absolute Eosinophils 0.1 0.0 - 0.7 10e3/uL    Absolute Basophils 0.0 0.0 - 0.2 10e3/uL    Absolute Immature Granulocytes 0.0 <=0.4 10e3/uL    Absolute NRBCs 0.0 10e3/uL   Glucose by meter     Status: Abnormal   Result Value Ref Range    GLUCOSE BY METER POCT 170 (H) 70 - 99 mg/dL   Adult Type and Screen     Status: None   Result Value Ref Range    ABO/RH(D) A POS     Antibody Screen Negative Negative    SPECIMEN EXPIRATION DATE [id number]    CBC with platelets differential     Status: Abnormal    Narrative    The following orders were created for panel order CBC with platelets differential.  Procedure                               Abnormality         Status                     ---------                               -----------         ------                     CBC with platelets and ...[[id number]]  Abnormal            Final result                 Please view results for these tests on the individual orders.   ABO/Rh type and screen     Status: None    Narrative    The following orders were created for panel order ABO/Rh type and screen.  Procedure                               Abnormality         Status                     ---------                               -----------         ------                     Adult Type and Screen[[id number]]                           Final result                 Please view results for these tests on the individual orders.     Medications   dexmedeTOMIDine (PRECEDEX) for U6B patients, 400 mcg/100 mL 0.9% NaCl (0.4 mcg/kg/hr × 99.8 kg Intravenous Rate/Dose Change 4/5/25 3909)   sodium chloride (OCEAN) 0.65 % nasal spray 1 spray (1 spray Both Nostrils $Given 4/5/25 3902)   white petrolatum GEL (has no administration in time range)   oxymetazoline (AFRIN) 0.05 % spray 2 spray (has no administration in time range)    amoxicillin-clavulanate (AUGMENTIN) 875-125 MG per tablet 1 tablet (has no administration in time range)   sodium chloride 0.9% BOLUS 1,000 mL (has no administration in time range)   furosemide (LASIX) tablet 20 mg ( Oral Automatically Held 4/8/25 0800)   rosuvastatin (CRESTOR) tablet 20 mg (has no administration in time range)   tamsulosin (FLOMAX) capsule 0.4 mg (has no administration in time range)   carbidopa-levodopa (SINEMET CR)  MG per CR tablet 1 tablet (has no administration in time range)   carvedilol (COREG) tablet 3.125 mg (has no administration in time range)   mirabegron (MYRBETRIQ) 24 hr tablet 50 mg (has no administration in time range)   lidocaine 1 % 0.1-1 mL (has no administration in time range)   lidocaine (LMX4) cream (has no administration in time range)   sodium chloride (PF) 0.9% PF flush 3 mL (has no administration in time range)   sodium chloride (PF) 0.9% PF flush 3 mL (has no administration in time range)   senna-docusate (SENOKOT-S/PERICOLACE) 8.6-50 MG per tablet 1 tablet (has no administration in time range)     Or   senna-docusate (SENOKOT-S/PERICOLACE) 8.6-50 MG per tablet 2 tablet (has no administration in time range)   calcium carbonate (TUMS) chewable tablet 1,000 mg (has no administration in time range)   acetaminophen (TYLENOL) tablet 650 mg (has no administration in time range)     Or   acetaminophen (TYLENOL) Suppository 650 mg (has no administration in time range)   melatonin tablet 1 mg (has no administration in time range)   ondansetron (ZOFRAN ODT) ODT tab 4 mg (has no administration in time range)     Or   ondansetron (ZOFRAN) injection 4 mg (has no administration in time range)   metoprolol (LOPRESSOR) injection 2.5 mg (has no administration in time range)   morphine (PF) injection 1 mg (has no administration in time range)   glucose gel 15-30 g (has no administration in time range)     Or   dextrose 50 % injection 25-50 mL (has no administration in time range)      Or   glucagon injection 1 mg (has no administration in time range)   insulin aspart (NovoLOG) injection (RAPID ACTING) (has no administration in time range)   LORazepam (ATIVAN) injection 0.5 mg (0.5 mg Intravenous $Given 4/5/25 1432)   haloperidol lactate (HALDOL) injection 2 mg (2 mg Intravenous $Given 4/5/25 1433)   haloperidol lactate (HALDOL) injection 3 mg (3 mg Intravenous $Given 4/5/25 1504)     Labs Ordered and Resulted from Time of ED Arrival to Time of ED Departure   CBC WITH PLATELETS AND DIFFERENTIAL - Abnormal       Result Value    WBC Count 10.6      RBC Count 4.01 (*)     Hemoglobin 12.4 (*)     Hematocrit 38.3 (*)     MCV 96      MCH 30.9      MCHC 32.4      RDW 14.3      Platelet Count 175      % Neutrophils 85      % Lymphocytes 9      % Monocytes 6      % Eosinophils 0      % Basophils 0      % Immature Granulocytes 1      NRBCs per 100 WBC 0      Absolute Neutrophils 9.0 (*)     Absolute Lymphocytes 0.9      Absolute Monocytes 0.6      Absolute Eosinophils 0.0      Absolute Basophils 0.0      Absolute Immature Granulocytes 0.1      Absolute NRBCs 0.0     RBC AND PLATELET MORPHOLOGY    RBC Morphology Confirmed RBC Indices      Platelet Assessment        Value: Automated Count Confirmed. Platelet morphology is normal.   GLUCOSE MONITOR NURSING POCT   GLUCOSE MONITOR NURSING POCT   GLUCOSE MONITOR NURSING POCT   GLUCOSE MONITOR NURSING POCT     No orders to display          Critical Care Addendum  My initial assessment, based on my review of prehospital provider report, review of nursing observations, review of vital signs, focused history, physical exam, review of cardiac rhythm monitor, discussion with ENT and medicine, and interpretation of response to treatment , established a high suspicion that Fco Arroyo has altered mental status, which requires immediate intervention, and therefore he is critically ill.     After the initial assessment, the care team initiated multiple lab tests,  initiated IV fluid administration, initiated medication therapy with iv ativan, haldol and precedex IV gtt, and consulted with ENT and medicine  to provide stabilization care. Due to the critical nature of this patient, I reassessed nursing observations, vital signs, physical exam, review of cardiac rhythm monitor, interpretation of labs and behavior, mental status, neurologic status, and respiratory status multiple times prior to his disposition.     Time also spent performing documentation, discussion with family to obtain medical information for decision making, reviewing test results, discussion with consultants, and coordination of care.     Critical care time (excluding teaching time and procedures): 60 minutes.       Assessment & Plan   75-year-old male with history of dementia Parkinson's presented from transfer from [facility] for ongoing epistaxis bilateral.  Patient had fallen Monday was evaluated has nasal fractures.  Patient started bleeding yesterday apparently evaluated in [facility] ER discharged home and continued bleeding today unable to stop even with bilateral Rhino Rocket's placed.  Initially had talked to their ENT who recommended interventional IR who stated patient needs to be seen by ENT down here patient then transferred by car and vitally stable with family.  Patient presented here to the ER with obvious bruising swelling of the nasal bones along with Rhino Rocket's in place but fairly adequate stasis at this point with only dripping of blood.  I had contacted ENT prior labs have been reviewed that were done at [facility] we did recheck a CBC and did a type and screen here they CBC reviewed shows a stable hemoglobin.  ENT was consulted when the patient did arrive after they did arrive patient was more agitated prior to this did receive 1/2 mg Ativan has history of a bad reaction to Seroquel discussed several times with pharmacy regarding medications.  Patient did receive 2 mg and then 3 mg of  Haldol IV which caused him to be slightly more sedate but still somewhat agitated prior to this he required also been a code 21 where he was placed in soft restraints of the wrist and chest.  He had maintained in this for concern that he is going to pull out his IV also wanted to leave unable to redirect with his confusion dementia without focal findings.  I talked to ENT after discussed with pharmacy and medicine as we were starting a Precedex drip planning [place] [facility] [place] did agree at this point.  Patient was seen by medicine initially down here in the ER also.  I have reassessed patient several times talk to the family patient at this point will be continually monitored as noted with continued conversation with pharmacy.  Signed out to evening physician just to continue to monitor situation to make sure appropriate response to Precedex.  ENT is aware so once patient is more calm they can hopefully get a better exam and can remove the Rhino Rocket's not causing excessive irritation and agitation.  We did order repeat blood sugar which was pending at this point patient currently unable to take things orally sinew to monitor status.       I have reviewed the nursing notes. I have reviewed the findings, diagnosis, plan and need for follow up with the patient.    New Prescriptions    No medications on file       Final diagnoses:   Epistaxis due to trauma   Personal history of fall   Closed fracture of nasal bone, sequela   Benign essential hypertension   History of dementia   Parkinson's disease, unspecified whether dyskinesia present, unspecified whether manifestations fluctuate (H)   Agitation       Juwan Sanchez MD  [facility] EMERGENCY DEPARTMENT  4/5/2025    This note was created at least in part by the use of dragon voice dictation system. Inadvertent typographical errors may still exist.  Juwan Sanchez MD.  Patient evaluated in the emergency department during the COVID-19 pandemic  period. Careful attention to patients safety was addressed throughout the evaluation. Evaluation and treatment management was initiated with disposition made efficiently and appropriate as possible to minimize any risk of potential exposure to patient during this evaluation.      This note was created at least in part by the use of dragon voice dictation system. Inadvertent typographical errors may still exist.  Juwan Sanchez MD.  Patient evaluated in the emergency department during the COVID-19 pandemic period. Careful attention to patients safety was addressed throughout the evaluation. Evaluation and treatment management was initiated with disposition made efficiently and appropriate as possible to minimize any risk of potential exposure to patient during this evaluation.                    Juwan Sanchez MD  04/05/25 1938

## 2025-04-05 NOTE — LETTER
Transition Communication Hand-off for Care Transitions to Next Level of Care Provider    Name: Fco Arroyo  : 1950  MRN #: [id number]  Primary Care Provider: Sue Melchor     Primary Clinic: [facility] [id number]     Reason for Hospitalization:  Personal history of fall [Z91.81]  Benign essential hypertension [I10]  Agitation [R45.1]  Closed fracture of nasal bone, sequela [S02.2XXS]  History of dementia [Z86.59]  Epistaxis due to trauma [R04.0]  Parkinson's disease, unspecified whether dyskinesia present, unspecified whether manifestations fluctuate (H) [G20.A1]  Admit Date/Time: 2025 12:41 PM  Discharge Date: 25  Payor Source: Payor: [facility] / Plan: [facility] MEDICARE ADVANTAGE / Product Type: Medicare /            Reason for Communication Hand-off Referral: Other Discharge to TCU    Discharge Plan: Patient to discharge to [facility] TCU today for further work with rehabilitative therapies.          Discharge Needs Assessment:  Needs      Flowsheet Row Most Recent Value   Equipment Currently Used at Home grab bar, tub/shower, cane, straight, raised toilet seat  [lift chair]            Follow-up plan:    Future Appointments   Date Time Provider Department Center   2025  9:00 AM Janes Echeverria OT [facility]   2025 10:00 AM Denice Childs, PT [facility]       Any outstanding tests or procedures:        Referrals       Future Labs/Procedures    Adult Urology  Referral     Process Instructions:    Consider Adult E-Consult to Urology for the following conditions: asymptomatic hematuria, BPH, erectile dysfunction, recurrent UTI.   E-Consult Cost: 0-$50.     Comments:    Please be aware that coverage of these services is subject to the terms and limitations of your health insurance plan.  Call member services at your health plan with any benefit or coverage questions.  [employer] [place] will call you to coordinate care as prescribed your  provider. If you don t hear from a representative within 2 business days, please call [phone].      Occupational Therapy Adult Consult     Comments:    Evaluate and treat as clinically indicated.    Reason:  problems related to;activity tolerance impaired;balance;cognition;range of motion (ROM);strength;mobility    Physical Therapy Adult Consult     Comments:    Evaluate and treat as clinically indicated.    Reason:  impaired functional mobility due to strength, pain, activity tolerance, balance, movement disorder - parkinsons              Key Recommendations:      ASHLEY Devine    AVS/Discharge Summary is the source of truth; this is a helpful guide for improved communication of patient story

## 2025-04-05 NOTE — H&P
[facility]    History and Physical - Hospitalist Service, GOLD TEAM        Date of Admission:  4/5/2025    Assessment & Plan      Fco Arroyo is a 75 year old male admitted on 4/5/2025. He has PMH of HTN, T2DM, anxiety, and dementia who had recent fall on 3/31 resulting in nasal bone fractures. He has since had issues with epistaxis for which he has been to [facility] ED multiple times. He was most recently sent home with BL Rhinorockets in place though continued to have bleeding through these prompting ED visit today. However, during evaluations, patient became increasingly distressed and agitated requiring Code 21 call. Patient will be admitted for further management given risk of severe bleed if he was to pull the rhinorockets at home.     Epistaxis  Nasal bone fractures 2/2 GLF 3/31  Patient tripped and fell while walking into [facility] on 3/31, was seen at ED that night and found to have acute fractures of the nasal bones and bony nasal septum. He was able to be discharged home, though he represented to [facility] ED on 4/4 due to epistaxis. Repeat imaging was stable. It was noted on exam, bony septum was visible and concerning for open and displaced fracture. ENT recommended Augmentin at that time which was started and he was discharged home. He presented again early this morning to [facility] ED for ongoing bleeding, and decision was made to place bilateral 5.5cm Rhino Rocket. Unfortunately, he continued to bleed around the packing and had blood coming out of his eyes. Decision was made to transfer to [facility] for possible IR embolization. Upon presentation to [facility] ED, he had slight dripping of blood. He was seen by ENT however, no exam/intervention could be performed as patient became acutely agitated, requiring 0.5mg Ativan IV, Haldol 5mg IV, Code 21, restraints, and eventually Precedex drip for patient's safety. There is concern that if patient were to go home at this time, he  may pull out packing and have large volume bleed. Family agrees with admission for further definitive management.   -- Continue Precedex gtt at this time   -- Continuous Pulse ox  -- Restraints in place for patient safety, deescalate as soon as safely possible  -- Repeat CBC in AM; low threshold for repeat Hgb if rebleeding occurs  -- Evaluated by ENT in the ED, unfortunately unable to intervene due to agitation; bleeding currently controlled so will continue to monitor with packing in place and reach out if patient more amenable to exam.    > If patient develops recurrent bright red bleeding around Rhinorockets:  > spray Afrin directly onto the rhinorockets and hold digital pressure on bilateral nostrils for 20 minutes. If bleeding continues after 20 minutes of digital pressure, repeat with Afrin and additional 20mins of direct pressure.  > Consider IV TXA if patient will continues to be combative and intolerant of bedside exam  > Consider IR consult for definitive embolization if large volume bleed  > If bleeding remains difficult to control despite interventions, contact ENT on call  > Abx for TSS ppx while dressing remains in place: Augmentin 875-125 BID  > Nasal saline q2 while awake  > Vaseline to anterior nares at night  > If patient is stable for discharge in the next 1-2 days, would recommend that the patient follow up in ENT clinic in 5-7 days to have rhinorockets removed (ENT will arrange outpatient follow up for removal)  -- Additional NS 1L infusion given limited PO intake over last day    Left eyebrow laceration s/p repair  Secondary to recent fall. Was sutured in the ED on 3/31.  -- Sutures appear ready to removed soon, deferred at this time given agitation    HTN  -- Continue PTA coreg 3.125 BID  -- HOLD PTA ASA given acute bleeding and likely need for nasal packings for several days  -- HOLD PTA Lasix in setting of acute bleeding and decreased PO intake  -- Continue PTA Statin    T2DM  -- HOLD PTA  "Metformin and Glipizide  -- MSSI  -- ACHS gluc checks    Acute Agitation  Anxiety  Dementia/Parkinson's disease  Family reports that patient is baseline A&Ox4, usually not aggressive.   -- Continue Precedex gtt at this time  -- Continue PTA Sinemet 25/100 TID    BPH  -- Continue PTA Flomax  -- Continue PTA Myrbetrig        Diet: Advance Diet as Tolerated: Clear Liquid Diet; Regular Diet Adult  DVT Prophylaxis: Pneumatic Compression Devices  Jung Catheter: Not present  Lines: None     Cardiac Monitoring: None  Code Status: Full Code    Clinically Significant Risk Factors Present on Admission                 # Drug Induced Platelet Defect: home medication list includes an antiplatelet medication   # Hypertension: Noted on problem list           # Obesity: Estimated body mass index is 35.51 kg/m  as calculated from the following:    Height as of an earlier encounter on 4/5/25: 1.676 m (5' 6\").    Weight as of an earlier encounter on 4/5/25: 99.8 kg (220 lb).              Disposition Plan     Medically Ready for Discharge: Anticipated in 2-4 Days         The patient's care was discussed with the Attending Physician, Dr. Palma, Bedside Nurse, Patient, and Patient's Family.    Yudi Jones PA-C  Hospitalist Service, [facility]  Securely message with [employer] (more info)  Text page via [employer] Paging/Directory   See signed in provider for up to date coverage information    ______________________________________________________________________    Chief Complaint   epistaxis    History is obtained from the patient    History of Present Illness   Fco Arroyo is a 75 year old male admitted on 4/5/2025. He has PMH of HTN, T2DM, anxiety, and dementia who had recent fall on 3/31 resulting in nasal bone fractures. He has since had issues with epistaxis for which he has been to OSH ED multiple times. He was most recently sent home with BL Rhinorockets in place " though continued to have bleeding through these prompting ED visit today. However, during evaluations, patient became increasingly distressed and agitated requiring Code 21 call. Patient will be admitted for further management given risk of severe bleed if he was to pull the rhinorockets at home.     Patient currently mildly agitated, BUE restrants in place for patient safety. Not able to provide much history, though patient's family is able to give information. They reports his agitation started today after multiple exams, also note that patient has not slept much and has not eaten. He is also claustrophobic and the new environment is likely contributing to his agitation. They report he has not complained of pain at all over the last several days despite his fractures and with nasal packing in place.       Past Medical History    Past Medical History:   Diagnosis Date    DIABETES MELLITUS TYPE II-UNCOMPL 2005    Essential hypertension, benign     on meds, Stress Echo  - normal, but suboptimal exertion    Other and unspecified hyperlipidemia     Other isolated or specific phobias     atenolol helps, wants open MRI    Other specified causes of urethral stricture     cystoscopy  some hematuria in past - benign    Squamous cell carcinoma        Past Surgical History   Past Surgical History:   Procedure Laterality Date    SURGICAL HISTORY OF -       open cholecystectomy for gallstones s/p ERCP    SURGICAL HISTORY OF -       cystoscopy    SURGICAL HISTORY OF -   1997    Urethral stricture       Prior to Admission Medications   Prior to Admission Medications   Prescriptions Last Dose Informant Patient Reported? Taking?   ASPIRIN 81 MG OR TABS   Yes No   Si TABLET DAILY   Misc Natural Products (TOTAL MEMORY & FOCUS FORMULA) TABS   Yes No   Sig: Take 1 tablet by mouth daily   Red Yeast Rice Extract 600 MG TABS   Yes No   Sig: Taking 1200 mg daily.   VITAMIN C 500 MG PO TABS   Yes No   Si  TABLET  DAILY   VITAMIN E   Yes No   Sig: daily   amoxicillin-clavulanate (AUGMENTIN) 875-125 MG tablet   No No   Sig: Take 1 tablet by mouth 2 times daily.   blood glucose (ACCU-CHEK SMARTVIEW) test strip   No No   Sig: Use to test blood sugar 1 times daily or as directed.   blood glucose (ACCU-CHEK SMARTVIEW) test strip   No No   Sig: Use to test blood sugar 2 times daily or as directed.   blood glucose monitoring (ACCU-CHEK FASTCLIX) lancets   No No   Si each 2 times daily Use to test blood sugar 2 times daily or as directed.   busPIRone (BUSPAR) 5 MG tablet   No No   Sig: TAKE 1 TABLET BY MOUTH EVERY 8 HOURS AS NEEDED FOR ANXIETY   calcipotriene (DOVONEX) 0.005 % external cream   No No   Sig: Mix with efudex and apply twice daily to scalp for 12 days   cinnamon 500 MG CAPS   Yes No   Sig: Take  by mouth.   fluorouracil (EFUDEX) 5 % external cream   No No   Sig: Mix with dovonex and apply twice daily to scalp for 12 days   fluticasone (FLONASE) 50 MCG/ACT nasal spray   No No   Sig: Spray 2 sprays into both nostrils daily   furosemide (LASIX) 20 MG tablet   No No   Sig: Take 1 tablet (20 mg) by mouth daily   glipiZIDE (GLUCOTROL XL) 2.5 MG 24 hr tablet   No No   Sig: Take 1 tablet by mouth once daily   lisinopril (ZESTRIL) 40 MG tablet   No No   Sig: Take 1 tablet by mouth once daily   metFORMIN (GLUCOPHAGE) 1000 MG tablet   No No   Sig: TAKE 1 TABLET BY MOUTH TWICE DAILY WITH MEALS   rosuvastatin (CRESTOR) 20 MG tablet   No No   Sig: Take 1 tablet by mouth once daily   sertraline (ZOLOFT) 50 MG tablet   No No   Sig: Take 1 tablet (50 mg) by mouth daily   tamsulosin (FLOMAX) 0.4 MG capsule   No No   Sig: Take 1 capsule (0.4 mg) by mouth daily      Facility-Administered Medications: None        Review of Systems    The 10 point Review of Systems is negative other than noted in the HPI or here.      Physical Exam   Vital Signs: Temp: 97.2  F (36.2  C) Temp src: Oral BP: (S) (!) 138/117 (Pt agitated, clenching  fists) Pulse: 104   Resp: 18 SpO2: 96 % O2 Device: None (Room air)    Weight: 0 lbs 0 oz    Physical Exam  Vitals reviewed.   Constitutional:       General: He is not in acute distress.     Appearance: He is not diaphoretic.   HENT:      Head:      Comments: Significant facial bruising, more so over left upper face and nose. BL Rhinorockets in place, no current signs of bleed through. Airway intact. Clean and dry sutures in place in medial left eyebrow  Cardiovascular:      Rate and Rhythm: Normal rate and regular rhythm.      Heart sounds: No murmur heard.  Pulmonary:      Effort: Pulmonary effort is normal.      Breath sounds: No wheezing, rhonchi or rales.   Skin:     General: Skin is warm and dry.   Neurological:      Mental Status: He is alert.      Comments: He is currently resting with his eyes closed, intermittently pulls at restraints, does not really answer questions for me but occasionally speaks to his family.              Medical Decision Making       78 MINUTES SPENT BY ME on the date of service doing chart review, history, exam, documentation & further activities per the note.      Data     I have personally reviewed the following data over the past 24 hrs:    10.6  \   12.4 (L)   / 175     141 107 27.0 (H) /  170 (H)   4.4 22 0.87 \     INR:  1.14 PTT:  24   D-dimer:  N/A Fibrinogen:  306       Imaging results reviewed over the past 24 hrs:   No results found for this or any previous visit (from the past 24 hours).

## 2025-04-05 NOTE — CONSULTS
Otolaryngology Consult Note  April 5, 2025      CC: Epistaxis    HPI: Fco Arroyo is a 75 year old male with a past medical history of HTN, T2DM, anxiety, and dementia who presents as self-transfer from outside ED for further evaluation of epistaxis with ENT and possibly IR. Epistaxis is in the setting of recent fall 5 days ago (3/31) w/ resulting nasal bone fractures.     Per chart review and history obtained from family, patient has had intermittent nosebleeds since accident. Today, he was brought to [facility] ED due to increased number of recurrent episodes over the last 24 hours and rhinorockets were placed bilaterally. Initially plan had been to discharge home with follow up in ENT clinic outpatient, however on further monitoring in the ED, patient had persistent oozing around the rhinorockets bilaterally and from the bilateral lacrimal ducts. ED had spoken to IR and discussed possible embolization, which neuro IR noted they would be happy to perform following initial ENT evaluation.    On evaluation patient is in significant distress, disoriented to place, and actively attempting to leave the bed/ED with multiple family members attempting to restrain. Patient is hemostatic on initial eval with dried crusted blood along bilateral anterior nares and no active bleeding from mouth, breathing comfortably on room air. To facilitate ENT eval, patient was given 1x Ativan, followed by 2mg Haldol. Unfortunately following this patient continued to demonstrate severe agitation and a code 21 (combative patient) was called with patient subsequently being placed into restraints.    Family expresses concern that patient will pull the rhinorockets due to discomfort and bleed at home. They are hoping for further definitive intervention.    Last PO intake was water around 9 am.    No hx of bleeding or clotting disorders, not on any anticoagulation.      ROS: 12 point review of systems is negative unless noted in  HPI.    PMH:  Past Medical History:   Diagnosis Date    DIABETES MELLITUS TYPE II-UNCOMPL 8/17/2005    Essential hypertension, benign     on meds, Stress Echo 1991 - normal, but suboptimal exertion    Other and unspecified hyperlipidemia     Other isolated or specific phobias     atenolol helps, wants open MRI    Other specified causes of urethral stricture     cystoscopy 1997 some hematuria in past - benign    Squamous cell carcinoma        PSH:  Past Surgical History:   Procedure Laterality Date    SURGICAL HISTORY OF -   1998    open cholecystectomy for gallstones s/p ERCP    SURGICAL HISTORY OF -   1997    cystoscopy    SURGICAL HISTORY OF -   11/1997    Urethral stricture       Meds:  Current Outpatient Medications   Medication Sig Dispense Refill    amoxicillin-clavulanate (AUGMENTIN) 875-125 MG tablet Take 1 tablet by mouth 2 times daily. 20 tablet 0    ASPIRIN 81 MG OR TABS 1 TABLET DAILY      blood glucose (ACCU-CHEK SMARTVIEW) test strip Use to test blood sugar 2 times daily or as directed. 200 each 1    blood glucose (ACCU-CHEK SMARTVIEW) test strip Use to test blood sugar 1 times daily or as directed. 50 strip 12    blood glucose monitoring (ACCU-CHEK FASTCLIX) lancets 1 each 2 times daily Use to test blood sugar 2 times daily or as directed. 200 each 1    busPIRone (BUSPAR) 5 MG tablet TAKE 1 TABLET BY MOUTH EVERY 8 HOURS AS NEEDED FOR ANXIETY 30 tablet 3    calcipotriene (DOVONEX) 0.005 % external cream Mix with efudex and apply twice daily to scalp for 12 days 60 g 3    cinnamon 500 MG CAPS Take  by mouth.      fluorouracil (EFUDEX) 5 % external cream Mix with dovonex and apply twice daily to scalp for 12 days 40 g 1    fluticasone (FLONASE) 50 MCG/ACT nasal spray Spray 2 sprays into both nostrils daily 47.4 mL 3    furosemide (LASIX) 20 MG tablet Take 1 tablet (20 mg) by mouth daily 30 tablet 1    glipiZIDE (GLUCOTROL XL) 2.5 MG 24 hr tablet Take 1 tablet by mouth once daily 90 tablet 0    lisinopril  (ZESTRIL) 40 MG tablet Take 1 tablet by mouth once daily 90 tablet 0    metFORMIN (GLUCOPHAGE) 1000 MG tablet TAKE 1 TABLET BY MOUTH TWICE DAILY WITH MEALS 60 tablet 1    Misc Natural Products (TOTAL MEMORY & FOCUS FORMULA) TABS Take 1 tablet by mouth daily      Red Yeast Rice Extract 600 MG TABS Taking 1200 mg daily.      rosuvastatin (CRESTOR) 20 MG tablet Take 1 tablet by mouth once daily 90 tablet 1    sertraline (ZOLOFT) 50 MG tablet Take 1 tablet (50 mg) by mouth daily 90 tablet 3    tamsulosin (FLOMAX) 0.4 MG capsule Take 1 capsule (0.4 mg) by mouth daily 90 capsule 3    VITAMIN C 500 MG PO TABS 1 TABLET  DAILY      VITAMIN E daily         Allergies:     Allergies   Allergen Reactions    Lisinopril Cough and Other (See Comments)    Losartan Cough and Other (See Comments)    Morphine GI Disturbance    Quetiapine Other (See Comments)     Other Reaction(s): Behavioral Disturbances      Patient got more agitated after taking seroquel at [facility].    Patient got more agitated after taking seroquel at [facility].    Terbinafine Other (See Comments)     hematuria       Social hx:  Social History     Socioeconomic History    Marital status:      Spouse name: Not on file    Number of children: Not on file    Years of education: Not on file    Highest education level: Not on file   Occupational History    Not on file   Tobacco Use    Smoking status: Former     Types: Cigarettes    Smokeless tobacco: Never   Substance and Sexual Activity    Alcohol use: Yes     Comment: rare    Drug use: No    Sexual activity: Not Currently   Other Topics Concern    Parent/sibling w/ CABG, MI or angioplasty before 65F 55M? Yes   Social History Narrative    Not on file     Social Drivers of Health     Financial Resource Strain: Low Risk  (12/1/2024)    [facility] [employer] [facility] [employer]    Financial Resource Strain     Difficulty of Paying Living Expenses: 3     Difficulty of Paying Living  Expenses: Not on file   Food Insecurity: No Food Insecurity (11/30/2024)    Received from [employer]    Food Insecurity     Do you worry your food will run out before you are able to buy more?: 1   Transportation Needs: No Transportation Needs (11/30/2024)    Received from [employer]    Transportation Needs     Does lack of transportation keep you from medical appointments?: 1     Does lack of transportation keep you from work, meetings or getting things that you need?: 1   Physical Activity: Not on file   Stress: Not on file   Social Connections: Socially Integrated (11/30/2024)    Received from [employer]    Social Connections     Do you often feel lonely or isolated from those around you?: 0   Interpersonal Safety: Not on file   Housing Stability: Low Risk  (11/30/2024)    [facility] [employer] [facility]    Housing Stability     What is your housing situation today?: 1       Family hx:  Not assessed  Family History   Problem Relation Age of Onset    Diabetes Mother     Hypertension Mother     Hypertension Father     Heart Disease Brother         MI x2, CABG, 1st MI at 49-51 yo.     Hypertension Brother     C.A.D. Brother     Alzheimer Disease Brother     Diabetes Sister          maybe    Coronary Artery Disease Brother     Heart Disease Brother     Melanoma No family hx of          PHYSICAL EXAM:  General: very agitated, attempting to leave and needing restraints; physically and verbally combative with respect to further evaluation and intervention  BP (!) 169/67   Pulse 93   Temp 97.2  F (36.2  C) (Oral)   Resp 18   SpO2 95%   HEAD: normocephalic, atraumatic  Face: symmetrical, CNVII appears to be intact bilaterally from facial movements, unable to assess CN V  Eyes: EOMI, clear sclera  Ears: bilateral pinna normal  Nose: bilateral rhinorockets in place with clot along bilateral  nares; no active bright red nasal bleeding. Paper tissues have small amounts of serosang nasal drainage  Mouth: moist, very poor dentition, tongue midline and symmetric  Oropharynx: unable to assess; no obvious clot  Neck: no LAD, trachea midline  Neuro: cranial nerves 2-12 grossly intact  Respiratory: breathing non-labored on RA, no stridor  Skin: no rashes or skin lesions of the face/neck  Psych: pleasant affect  Cardio: extremities warm and well perfused     RIGID ENDOSCOPY: unable to perform due to patient mental status    ROUTINE IP LABS (Last four results)  BMP  Recent Labs   Lab 25  1037 25  0738   NA  --  141   POTASSIUM  --  4.4   CHLORIDE  --  107   RABIA  --  9.2   CO2  --  22   BUN  --  27.0*   CR  --  0.87   * 206*     CBC  Recent Labs   Lab 25  1315 25  0738   WBC 10.6 11.8*   RBC 4.01* 4.01*   HGB 12.4* 12.4*   HCT 38.3* 37.5*   MCV 96 94   MCH 30.9 30.9   MCHC 32.4 33.1   RDW 14.3 13.3    185     INR  Recent Labs   Lab 25  0738   INR 1.14       Imagin25 CT facial bones  1.  Acute appearing and mildly displaced nasal bone nasal fractures.  2.  Chronic appearing deformity of the medial left orbital wall.  3.  Multiple periapical lucencies of the maxillary incisors and right premolar are nonspecific but are favored to represent periapical lucencies, and possible periapical abscesses, rather than acute trauma.      Assessment and Plan  Fco Arroyo is a 75 year old male with a past medical history of HTN, T2DM, anxiety, and dementia who presents as self-transfer from outside ED for further evaluation of intermittent epistaxis in the setting of recent fall 5 days ago (3/31) w/ resulting nasal bone fractures. Gratefully on evaluation patient appears to be hemostatic with bilateral rhinorockets in place. Discussed with family their concerns about patient being disoriented, uncomfortable, and risk of pulling the rhinorockets and causing repeat bleeding  once at home. I did discuss option of removing these rhinorockets and had plan to perform scope exam to assess source of bleeding and replace with absorbable packing that could not be as easily removed. Unfortunately patient had code 21 called for being combative and in current mental state requiring physical restraints, discussed with ED that I do not recommend removal of rhinorockets which appear to have achieved hemostasis due to risk of rebleeding without guaranteed efficacy in re-achieving hemostasis. Should he become more calm and appear to tolerate further examination and intervention with additional agents, I discussed I would be happy to return and re-attempt exam/packing. In the interim, discussed it would be reasonable to keep rhinorockets in place for 3-5 days and follow up in ENT clinic for removal.     -If patient develops recurrent bright red bleeding around rhinorockets, consider IV TXA if patient will continues to be combative and intolerant of bedside exam  -Otherwise consult IR for definitive embolization if concerns for large volumes of epistaxis  - Minimize pressure on nasal ala  - Antibiotics while non absorbable nasal packing is in place for toxic shock syndrome prophylaxis  - Nasal saline q 2 hours while awake to prevent denuding of nasal mucosa while non absorbable packing is in place  - Vaseline to anterior nares at night  - There may be some slow red oozing after the packing was placed - this is normal and likely from the Afrin that is soaked in the rhinorockets  - If patient experiences brisk bleeding, spray Afrin directly onto the rhinorockets and hold digital pressure on bilateral nostrils for 20 minutes. If bleeding continues after 20 minutes of digital pressure, repeat the above steps. If this fails to control the bleeding at that time, call ENT for further recommendations.  - If patient is stable for discharge in the next 1-2 days, would recommend that the patient follow up in ENT  clinic in 5-7 days to have rhinorockets removed (we will arrange outpatient ENT follow up for removal)      Above plan to be discussed with staff Attending Dr. Frankel.    Stacey Alvarado MD  Otolaryngology-Head & Neck Surgery  Please page ENT with questions by dialing [phone] and entering job code [phone] when prompted.

## 2025-04-05 NOTE — ED PROVIDER NOTES
Fco Arroyo is a 75-year-old male who was transferred to me by previous provider Dr. Larry Rodgers.  Patient was here with recurrent nosebleeds after being diagnosed with a nose fracture secondary to fall earlier this week.  Not on anticoagulation.  Please read previous provider note for interventions done.  Patient had Rhino Rocket's placed in bilateral nares and has continued bleeding and now has bleeding coming out of his eyes.    Considering this, paged ENT and they recommend a more prompt evaluation by neuro IR possibly for embolization.  I spoke with Dr. Devyn Watson from neuro IR and he said that the patient does need ENT evaluation but they are happy to help with embolization as needed.  They recommended that the patient be transferred down to the [facility] emergency department for further evaluation.    I spoke to the [facility] emergency department and they accepted the patient for ED to ED transfer.  I attempted to page ENT at the [facility] however they have not yet called me back.  This was to give them a heads up that the patient was coming.    While here in the emergency department the patient has eaten and received his morning meds.  He did have some fluids running at 50 cc/h considering decreased oral intake.    Going by private car with his family.  They are comfortable with this.  Will leave the IV in place.    Transferred for definitive care and definitive management of his persistent nosebleeds in the setting of nose fractures.    Patient is vitally stable and stable for transfer by private car    Rebecca Neil,   04/05/25 1047

## 2025-04-05 NOTE — ED NOTES
Per family pt has early dementia. Pt is A/Ox4, is forgetful. Pt is anxious, wanting to ambulate. Epistaxis is controlled. Rhino rockets intact. Writer gave report to [facility] ER. Provided family with transfer packet, and facility address. Writer medicated with ABX, and flomax. Writer held lasix as pt is transferring via private car and has urgency. PIV in place. Wrapped with coban.

## 2025-04-05 NOTE — PROGRESS NOTES
Brief Medicine Progress Note    Contacted by RN re: patients ongoing agitation while on 0.7 mcg/kg/hr of Precedex.     He is comfortable at rest but easily agitated with repositioning and other nursing cares. He is slouched down in his bed, somewhat occluding his airway given blocked nasal airway and requiring Oxymask at 1L to maintain sats, but is fighting with staff with repositioning. Still requiring restraints for safety as he tries to grab and swing at staff when they are removed.. He is also grimacing and clenching his fists intermittently. I question if patient is in pain given acute fractures, BL nasal packings, and no pain control throughout the day which may be contributing to his agitation. Family is concerned he is anxious. Considered IV Opioid for pain/anxiety control, which family preferred to defer for now.  Staff was able to transition him to a hospital bed from a stretcher which seems to have helped with his comfort for now. Goal is to wean down patients Precedex.    He is currently unable to tolerate PO intake given his delirium. Will give Lopressor 2.5mg IV x1 in place of his Coreg. I also discussed his abx plan with pharmacy given he will be unable to take Augmentin this evening. They recommended Unasyn, which was ordered.   Will also adjust glucose checks to q4 hours given no improvement in oral intake.         Yudi Jones PA-C, [facility]  Hospitalist Service   [facility]  Securely message with Vocera   See AMCOM signed in provider for up to date coverage information

## 2025-04-05 NOTE — ED NOTES
Assisted with Michelle SHAWT to the bedside to void. HE had trouble in standing with generalized leg weakness

## 2025-04-05 NOTE — ED PROVIDER NOTES
ED Provider Note  [facility]      History     Chief Complaint   Patient presents with    Epistaxis     HPI  Fco Arroyo is a 75 year old male who presents to the emergency department with concerns regarding nosebleed.  Patient's recent history is significant for fall with nasal septum and nasal bone fractures, with ED visit yesterday during which time repeat CT scan of the facial bones was performed.  I personally reviewed CT imaging showing nasal fractures.  These were slightly displaced.  Patient was started on Augmentin, and discharged home.  Beginning around 3:30 AM, about 1 to 2 hours prior to ED arrival, had persistent nosebleed.  However, did have episode of nosebleed in the afternoon as well.  No new trauma.  Has avoided manipulation of the nose.        Independent Historian:        Review of External Notes:  I reviewed ED visit from March 31, in addition to yesterday.  Also reviewed CT images.      Allergies:  Allergies   Allergen Reactions    Lisinopril Cough and Other (See Comments)    Losartan Cough and Other (See Comments)    Morphine GI Disturbance    Quetiapine Other (See Comments)     Other Reaction(s): Behavioral Disturbances      Patient got more agitated after taking seroquel at [facility].    Patient got more agitated after taking seroquel at [facility].    Terbinafine Other (See Comments)     hematuria       Problem List:    Patient Active Problem List    Diagnosis Date Noted    Generalized anxiety disorder 10/26/2020     Priority: Medium    Central retinal vein occlusion of right eye, unspecified complication status (H) 06/05/2020     Priority: Medium    Benign essential hypertension 11/16/2016     Priority: Medium    Type 2 diabetes mellitus without complication, without long-term current use of insulin (H) 11/16/2016     Priority: Medium    Morbid obesity (H) 10/25/2015     Priority: Medium    Hyperlipidemia LDL goal <100 11/26/2008     Priority:  Medium     with DM2.  LDL a little high - max simvastatin - had changed fm lipitor.      Sensorineural hearing loss, bilateral 11/22/2007     Priority: Medium     gradual - referral to audiology      R PLANTAR FIBROMATOSIS 07/16/2007     Priority: Medium     change footwear,  conservative care.           Past Medical History:    Past Medical History:   Diagnosis Date    DIABETES MELLITUS TYPE II-UNCOMPL 8/17/2005    Essential hypertension, benign     Other and unspecified hyperlipidemia     Other isolated or specific phobias     Other specified causes of urethral stricture     Squamous cell carcinoma        Past Surgical History:    Past Surgical History:   Procedure Laterality Date    SURGICAL HISTORY OF -   1998    open cholecystectomy for gallstones s/p ERCP    SURGICAL HISTORY OF -   1997    cystoscopy    SURGICAL HISTORY OF -   11/1997    Urethral stricture       Family History:    Family History   Problem Relation Age of Onset    Diabetes Mother     Hypertension Mother     Hypertension Father     Heart Disease Brother         MI x2, CABG, 1st MI at 49-49 yo.     Hypertension Brother     C.A.D. Brother     Alzheimer Disease Brother     Diabetes Sister          maybe    Coronary Artery Disease Brother     Heart Disease Brother     Melanoma No family hx of        Social History:  Marital Status:   [2]  Social History     Tobacco Use    Smoking status: Former     Types: Cigarettes    Smokeless tobacco: Never   Substance Use Topics    Alcohol use: Yes     Comment: rare    Drug use: No        Medications:    amoxicillin-clavulanate (AUGMENTIN) 875-125 MG tablet  ASPIRIN 81 MG OR TABS  blood glucose (ACCU-CHEK SMARTVIEW) test strip  blood glucose (ACCU-CHEK SMARTVIEW) test strip  blood glucose monitoring (ACCU-CHEK FASTCLIX) lancets  busPIRone (BUSPAR) 5 MG tablet  calcipotriene (DOVONEX) 0.005 % external cream  cinnamon 500 MG CAPS  fluorouracil (EFUDEX) 5 % external cream  fluticasone (FLONASE) 50 MCG/ACT  "nasal spray  furosemide (LASIX) 20 MG tablet  glipiZIDE (GLUCOTROL XL) 2.5 MG 24 hr tablet  lisinopril (ZESTRIL) 40 MG tablet  metFORMIN (GLUCOPHAGE) 1000 MG tablet  Misc Natural Products (TOTAL MEMORY & FOCUS FORMULA) TABS  Red Yeast Rice Extract 600 MG TABS  rosuvastatin (CRESTOR) 20 MG tablet  sertraline (ZOLOFT) 50 MG tablet  tamsulosin (FLOMAX) 0.4 MG capsule  VITAMIN C 500 MG PO TABS  VITAMIN E          Review of Systems  A medically appropriate review of systems was performed with pertinent positives and negatives noted in the HPI, and all other systems negative.    Physical Exam   Patient Vitals for the past 24 hrs:   BP Temp Temp src Pulse Resp SpO2 Height Weight   04/05/25 0501 -- -- -- -- -- -- 1.676 m (5' 6\") 99.8 kg (220 lb)   04/05/25 0500 (!) 173/80 97.8  F (36.6  C) Tympanic 80 16 96 % -- --          Physical Exam  General: alert and in acute distress on arrival  Head: atraumatic, normocephalic  Nose: Nasal clamp in place.  Ongoing bleeding.  Lungs:  nonlabored  CV:  extremities warm and perfused  Abd: nondistended  Skin: no rashes, no diaphoresis and skin color normal  Neuro: Patient awake, alert, speech is fluent,   Psychiatric: affect/mood normal,        ED Course                 [facility]    -Epistaxis Mgmt    Date/Time: 4/5/2025 5:25 AM    Performed by: Nathanael Rodgers MD  Authorized by: Nathanael Rodgers MD    Risks, benefits and alternatives discussed.      ANESTHESIA (see MAR for exact dosages)     Anesthesia method:  None  PROCEDURE DETAILS     Treatment site:  L anterior and R anterior    Treatment method:  Anterior pack    Treatment episode: no recurrence of recent bleed      POST PROCEDURE     Assessment:  Bleeding decreased    PROCEDURE    Patient Tolerance:  Patient tolerated the procedure well with no immediate complications                   None         Results for orders placed or performed during the hospital encounter of 04/04/25 (from the past " 24 hours)   CT Facial Bones without Contrast    Narrative    EXAM: CT FACIAL BONES WITHOUT CONTRAST  LOCATION: [facility]  DATE: 4/4/2025    INDICATION: nasal bone fractures, acute nose bleeding,  COMPARISON: None.  TECHNIQUE: Routine CT Maxillofacial without IV contrast. Multiplanar reformats. Dose reduction techniques were used.     FINDINGS:    Acute appearing and mildly displaced nasal bone fractures with a small amount of presumable hemorrhage within the nasal cavity. Additionally, there are displaced acute appearing fractures of the nasal septum. The zygomatic arches are maintained. There   are no acute fractures of the orbits however there is likely a age indeterminate but chronic appearing fracture of the medial left orbital wall.    Prominent periapical lucencies regarding the central and lateral incisors of the mandible on the right. Additional prominent periapical lucency regarding the right mandibular premolar.         Impression    IMPRESSION:   1.  Acute appearing and mildly displaced nasal bone nasal fractures.  2.  Chronic appearing deformity of the medial left orbital wall.  3.  Multiple periapical lucencies of the maxillary incisors and right premolar are nonspecific but are favored to represent periapical lucencies, and possible periapical abscesses, rather than acute trauma.         MEDICATIONS GIVEN IN THE EMERGENCY DEPARTMENT:  Medications   phenylephrine (JIMI-SYNEPHRINE) 0.5 % spray 1 drop (has no administration in time range)           Independent Interpretation (X-rays, CTs, rhythm strip):  None    Consultations/Discussion of Management or Tests:  None       Social Determinants of Health affecting care:         Assessments & Plan (with Medical Decision Making)  75 year old male who presents to the Emergency Department for evaluation of nosebleed.  Patient has had recurrent episodes of nosebleeding, especially over the last 24 hours, however did have some  nosebleeding on the 31st.    Patient does have known history of nasal fracture, slightly displaced, however I feel benefit outweighs risk with nasal packing given the ongoing, severe amount of ongoing bleeding.  After discussion with the patient, in addition to family members, 5.5 cm Rhino Rocket was placed into the left naris.  Did have large clot which was removed from the mouth after Rhino Rocket placement.  Repeat assessment shows decreased bleeding.    Patient will be monitored in the emergency department, with likely discharge home with Rhino Rocket in place.  Follow-up recommended in ENT clinic.  He is currently on antibiotics, and will continue those antibiotics.  Continued bleeding.,  And therefore decision made for right nasal packing in addition to left.  Anticipate discharge home if bleeding stopped.      I have reviewed the nursing notes.    I have reviewed the findings, diagnosis, plan and need for follow up with the patient.             NEW PRESCRIPTIONS STARTED AT TODAY'S ER VISIT  New Prescriptions    No medications on file       Final diagnoses:   Epistaxis       4/5/2025   [facility] EMERGENCY DEPT       Nathanael Rodgers MD  04/05/25 0527       Nathanael Rodgers MD  04/05/25 0558

## 2025-04-05 NOTE — ED TRIAGE NOTES
Fell Monday and reported nasal fracture. Now yesterday had a nosebleed and was seen in the ED for this issue. This AM about 03:00 began bleeding again and has been since.     Reported yesterday was some nasal spray.

## 2025-04-05 NOTE — DISCHARGE INSTRUCTIONS
Leave packing in place.    Go directly to the [facility] emergency department for further workup.  They are expecting you.  The address is [address] [place], [place]    Give them your name when you check in at the ER and tell them that they are expecting you and you were transferred from a outside hospital.

## 2025-04-05 NOTE — ED NOTES
Bed: ED20  Expected date: 4/5/25  Expected time:   Means of arrival:   Comments:  [facility]

## 2025-04-05 NOTE — LETTER
Transition Communication Hand-off for Care Transitions to Next Level of Care Provider    Name: Fco Arroyo  : 1950  MRN #: [id number]  Primary Care Provider: Sue Melchor     Primary Clinic: [address] [place] [id number]     Reason for Hospitalization:  Personal history of fall [Z91.81]  Benign essential hypertension [I10]  Agitation [R45.1]  Closed fracture of nasal bone, sequela [S02.2XXS]  History of dementia [Z86.59]  Epistaxis due to trauma [R04.0]  Parkinson's disease, unspecified whether dyskinesia present, unspecified whether manifestations fluctuate (H) [G20.A1]  Admit Date/Time: 2025 12:41 PM  Discharge Date: 2025  Payor Source: Payor: [facility] / Plan: [facility] [employer] MEDICARE ADVANTAGE / Product Type: Medicare /   Discharge Plan: Pt discharging to [facility] tomorrow.  Concern for non-adherence with plan of care:   No  Discharge Needs Assessment:  Needs      Flowsheet Row Most Recent Value   Equipment Currently Used at Home grab bar, tub/shower, cane, straight, raised toilet seat  [lift chair]          Already enrolled in Tele-monitoring program and name of program:  No  Follow-up specialty is recommended: No    Follow-up plan:    Future Appointments   Date Time Provider Department Center   2025  1:15 PM Shanon Ortiz, [facility]   2025  9:00 AM Janes Echeverria [facility]   2025  1:00 PM Mukul Ahmadi [facility]     [place]    AVS/Discharge Summary is the source of truth; this is a helpful guide for improved communication of patient story

## 2025-04-06 LAB
ALBUMIN SERPL BCG-MCNC: 3.7 G/DL (ref 3.5–5.2)
ALP SERPL-CCNC: 61 U/L (ref 40–150)
ALT SERPL W P-5'-P-CCNC: 13 U/L (ref 0–70)
ANION GAP SERPL CALCULATED.3IONS-SCNC: 12 MMOL/L (ref 7–15)
AST SERPL W P-5'-P-CCNC: 21 U/L (ref 0–45)
BILIRUB SERPL-MCNC: 0.8 MG/DL
BUN SERPL-MCNC: 33.5 MG/DL (ref 8–23)
CALCIUM SERPL-MCNC: 9 MG/DL (ref 8.8–10.4)
CHLORIDE SERPL-SCNC: 111 MMOL/L (ref 98–107)
CREAT SERPL-MCNC: 0.87 MG/DL (ref 0.67–1.17)
EGFRCR SERPLBLD CKD-EPI 2021: 89 ML/MIN/1.73M2
ERYTHROCYTE [DISTWIDTH] IN BLOOD BY AUTOMATED COUNT: 13.6 % (ref 10–15)
GLUCOSE BLDC GLUCOMTR-MCNC: 123 MG/DL (ref 70–99)
GLUCOSE BLDC GLUCOMTR-MCNC: 124 MG/DL (ref 70–99)
GLUCOSE BLDC GLUCOMTR-MCNC: 129 MG/DL (ref 70–99)
GLUCOSE BLDC GLUCOMTR-MCNC: 140 MG/DL (ref 70–99)
GLUCOSE BLDC GLUCOMTR-MCNC: 144 MG/DL (ref 70–99)
GLUCOSE BLDC GLUCOMTR-MCNC: 145 MG/DL (ref 70–99)
GLUCOSE BLDC GLUCOMTR-MCNC: 159 MG/DL (ref 70–99)
GLUCOSE SERPL-MCNC: 122 MG/DL (ref 70–99)
HCO3 SERPL-SCNC: 23 MMOL/L (ref 22–29)
HCT VFR BLD AUTO: 35.1 % (ref 40–53)
HGB BLD-MCNC: 11.7 G/DL (ref 13.3–17.7)
HGB BLD-MCNC: 11.8 G/DL (ref 13.3–17.7)
MCH RBC QN AUTO: 30.4 PG (ref 26.5–33)
MCHC RBC AUTO-ENTMCNC: 33.3 G/DL (ref 31.5–36.5)
MCV RBC AUTO: 91 FL (ref 78–100)
PLATELET # BLD AUTO: 167 10E3/UL (ref 150–450)
POTASSIUM SERPL-SCNC: 3.6 MMOL/L (ref 3.4–5.3)
PROT SERPL-MCNC: 6 G/DL (ref 6.4–8.3)
RBC # BLD AUTO: 3.85 10E6/UL (ref 4.4–5.9)
SODIUM SERPL-SCNC: 146 MMOL/L (ref 135–145)
WBC # BLD AUTO: 11.5 10E3/UL (ref 4–11)

## 2025-04-06 PROCEDURE — 85018 HEMOGLOBIN: CPT

## 2025-04-06 PROCEDURE — 82962 GLUCOSE BLOOD TEST: CPT

## 2025-04-06 PROCEDURE — 120N000003 HC R&B IMCU UMMC

## 2025-04-06 PROCEDURE — 250N000012 HC RX MED GY IP 250 OP 636 PS 637

## 2025-04-06 PROCEDURE — 99232 SBSQ HOSP IP/OBS MODERATE 35: CPT | Performed by: STUDENT IN AN ORGANIZED HEALTH CARE EDUCATION/TRAINING PROGRAM

## 2025-04-06 PROCEDURE — 250N000009 HC RX 250: Performed by: FAMILY MEDICINE

## 2025-04-06 PROCEDURE — 250N000011 HC RX IP 250 OP 636

## 2025-04-06 PROCEDURE — 36415 COLL VENOUS BLD VENIPUNCTURE: CPT | Performed by: STUDENT IN AN ORGANIZED HEALTH CARE EDUCATION/TRAINING PROGRAM

## 2025-04-06 PROCEDURE — 36415 COLL VENOUS BLD VENIPUNCTURE: CPT

## 2025-04-06 PROCEDURE — 85041 AUTOMATED RBC COUNT: CPT

## 2025-04-06 PROCEDURE — 85018 HEMOGLOBIN: CPT | Performed by: STUDENT IN AN ORGANIZED HEALTH CARE EDUCATION/TRAINING PROGRAM

## 2025-04-06 PROCEDURE — 82040 ASSAY OF SERUM ALBUMIN: CPT

## 2025-04-06 PROCEDURE — 250N000013 HC RX MED GY IP 250 OP 250 PS 637

## 2025-04-06 RX ORDER — MULTIVITAMIN WITH IRON
1 TABLET ORAL DAILY
COMMUNITY

## 2025-04-06 RX ORDER — CARBIDOPA/LEVODOPA 25MG-250MG
1 TABLET ORAL 3 TIMES DAILY
COMMUNITY

## 2025-04-06 RX ORDER — MIRABEGRON 50 MG/1
1 TABLET, FILM COATED, EXTENDED RELEASE ORAL DAILY
COMMUNITY

## 2025-04-06 RX ORDER — CARVEDILOL 3.12 MG/1
3.12 TABLET ORAL 2 TIMES DAILY WITH MEALS
COMMUNITY

## 2025-04-06 RX ORDER — NYSTATIN 100000 U/G
CREAM TOPICAL 2 TIMES DAILY
Status: DISCONTINUED | OUTPATIENT
Start: 2025-04-06 | End: 2025-04-12 | Stop reason: HOSPADM

## 2025-04-06 RX ADMIN — SALINE NASAL SPRAY 1 SPRAY: 1.5 SOLUTION NASAL at 09:46

## 2025-04-06 RX ADMIN — SALINE NASAL SPRAY 1 SPRAY: 1.5 SOLUTION NASAL at 22:03

## 2025-04-06 RX ADMIN — SALINE NASAL SPRAY 1 SPRAY: 1.5 SOLUTION NASAL at 20:45

## 2025-04-06 RX ADMIN — DEXMEDETOMIDINE HYDROCHLORIDE 0.4 MCG/KG/HR: 4 INJECTION, SOLUTION INTRAVENOUS at 20:38

## 2025-04-06 RX ADMIN — CARBIDOPA AND LEVODOPA 1 TABLET: 25; 100 TABLET, EXTENDED RELEASE ORAL at 14:27

## 2025-04-06 RX ADMIN — TAMSULOSIN HYDROCHLORIDE 0.4 MG: 0.4 CAPSULE ORAL at 09:44

## 2025-04-06 RX ADMIN — SALINE NASAL SPRAY 1 SPRAY: 1.5 SOLUTION NASAL at 17:54

## 2025-04-06 RX ADMIN — MIRABEGRON 50 MG: 50 TABLET, EXTENDED RELEASE ORAL at 09:45

## 2025-04-06 RX ADMIN — CARVEDILOL 3.12 MG: 3.12 TABLET, FILM COATED ORAL at 17:53

## 2025-04-06 RX ADMIN — AMPICILLIN SODIUM AND SULBACTAM SODIUM 3 G: 2; 1 INJECTION, POWDER, FOR SOLUTION INTRAMUSCULAR; INTRAVENOUS at 11:11

## 2025-04-06 RX ADMIN — SALINE NASAL SPRAY 1 SPRAY: 1.5 SOLUTION NASAL at 12:53

## 2025-04-06 RX ADMIN — CARVEDILOL 3.12 MG: 3.12 TABLET, FILM COATED ORAL at 09:44

## 2025-04-06 RX ADMIN — AMPICILLIN SODIUM AND SULBACTAM SODIUM 3 G: 2; 1 INJECTION, POWDER, FOR SOLUTION INTRAMUSCULAR; INTRAVENOUS at 22:01

## 2025-04-06 RX ADMIN — DEXMEDETOMIDINE HYDROCHLORIDE 0.5 MCG/KG/HR: 4 INJECTION, SOLUTION INTRAVENOUS at 22:52

## 2025-04-06 RX ADMIN — ROSUVASTATIN CALCIUM 20 MG: 20 TABLET, FILM COATED ORAL at 09:44

## 2025-04-06 RX ADMIN — SALINE NASAL SPRAY 1 SPRAY: 1.5 SOLUTION NASAL at 14:28

## 2025-04-06 RX ADMIN — NYSTATIN: 100000 CREAM TOPICAL at 20:48

## 2025-04-06 RX ADMIN — CARBIDOPA AND LEVODOPA 1 TABLET: 25; 100 TABLET, EXTENDED RELEASE ORAL at 20:46

## 2025-04-06 RX ADMIN — INSULIN ASPART 1 UNITS: 100 INJECTION, SOLUTION INTRAVENOUS; SUBCUTANEOUS at 22:51

## 2025-04-06 RX ADMIN — DEXMEDETOMIDINE HYDROCHLORIDE 0.2 MCG/KG/HR: 4 INJECTION, SOLUTION INTRAVENOUS at 01:46

## 2025-04-06 RX ADMIN — AMPICILLIN SODIUM AND SULBACTAM SODIUM 3 G: 2; 1 INJECTION, POWDER, FOR SOLUTION INTRAMUSCULAR; INTRAVENOUS at 17:49

## 2025-04-06 RX ADMIN — AMPICILLIN SODIUM AND SULBACTAM SODIUM 3 G: 2; 1 INJECTION, POWDER, FOR SOLUTION INTRAMUSCULAR; INTRAVENOUS at 03:53

## 2025-04-06 RX ADMIN — CARBIDOPA AND LEVODOPA 1 TABLET: 25; 100 TABLET, EXTENDED RELEASE ORAL at 09:45

## 2025-04-06 ASSESSMENT — ACTIVITIES OF DAILY LIVING (ADL)
ADLS_ACUITY_SCORE: 72
ADLS_ACUITY_SCORE: 64
ADLS_ACUITY_SCORE: 72
ADLS_ACUITY_SCORE: 41
ADLS_ACUITY_SCORE: 64
ADLS_ACUITY_SCORE: 65
ADLS_ACUITY_SCORE: 64
ADLS_ACUITY_SCORE: 72
ADLS_ACUITY_SCORE: 41
ADLS_ACUITY_SCORE: 72
ADLS_ACUITY_SCORE: 65
ADLS_ACUITY_SCORE: 72
ADLS_ACUITY_SCORE: 41
ADLS_ACUITY_SCORE: 65
ADLS_ACUITY_SCORE: 72

## 2025-04-06 NOTE — MEDICATION SCRIBE - ADMISSION MEDICATION HISTORY
Medication Scribe Admission Medication History    Admission medication history is complete. The information provided in this note is only as accurate as the sources available at the time of the update.    Information Source(s): Caregiver via N/A    Pertinent Information:   Unable to assess patient's nurse reported patient could not give any information regarding his home medications.  Changes made to PTA medication list:  Added: None  Deleted: None  Changed: None    Allergies reviewed with patient and updates made in EHR: no    Medication History Completed By: Harmony Salgado 4/5/2025 7:18 PM    No outpatient medications have been marked as taking for the 4/5/25 encounter (Hospital Encounter).

## 2025-04-06 NOTE — PROGRESS NOTES
[facility]    Medicine Progress Note - Hospitalist Service, GOLD TEAM 8    Date of Admission:  4/5/2025    Assessment & Plan   Fco Arroyo is a 75 year old male admitted on 4/5/2025. He has PMH of HTN, T2DM, anxiety, and dementia who had recent fall on 3/31 resulting in nasal bone fractures. He has since had issues with epistaxis for which he has been to [facility] ED multiple times. He was most recently sent home with BL Rhinorockets in place though continued to have bleeding through these prompting ED visit today. However, during evaluations, patient became increasingly distressed and agitated requiring Code 21 call. Patient will be admitted for further management given risk of severe bleed if he was to pull the rhinorockets at home. Continues to have post-nasal drip with drop in hemoglobin; awaiting ENT reevaluation at bedside now that patient is redirectable.     Epistaxis  Nasal bone fractures 2/2 GLF 3/31  Patient tripped and fell while walking into [facility] on 3/31, was seen at ED that night and found to have acute fractures of the nasal bones and bony nasal septum. He was able to be discharged home, though he represented to [facility] ED on 4/4 due to epistaxis. Repeat imaging was stable. It was noted on exam, bony septum was visible and concerning for open and displaced fracture. ENT recommended Augmentin at that time which was started and he was discharged home. He presented again early this morning to [facility] ED for ongoing bleeding, and decision was made to place bilateral 5.5cm Rhino Rocket. Unfortunately, he continued to bleed around the packing and had blood coming out of his eyes. Decision was made to transfer to [facility] for possible IR embolization. Upon presentation to [facility] ED, he had slight dripping of blood. He was seen by ENT however, no exam/intervention could be performed as patient became acutely agitated, requiring 0.5mg Ativan IV, Haldol 5mg IV, Code 21,  restraints, and eventually Precedex drip for patient's safety. There is concern that if patient were to go home at this time, he may pull out packing and have large volume bleed. Family agrees with admission for further definitive management.   -- Continue Precedex gtt at this time until ENT evaluation - if ENT has not seen patient by 20:00 (will see in AM then), nursing can wean precedex gtt to off as patient's behavior allows  -- Continuous Pulse ox  -- Restraints no longer in place, sitter present  -- Repeat hgb this evening and CBC in AM; low threshold for repeat Hgb if rebleeding occurs  -- Evaluated by ENT in the ED, unfortunately unable to intervene due to agitation; bleeding currently controlled so will continue to monitor with packing in place and reach out if patient more amenable to exam - ENT requested for reevaluation              > If patient develops recurrent bright red bleeding around Rhinorockets:  > spray Afrin directly onto the rhinorockets and hold digital pressure on bilateral nostrils for 20 minutes. If bleeding continues after 20 minutes of digital pressure, repeat with Afrin and additional 20mins of direct pressure.  > Consider IV TXA if patient will continues to be combative and intolerant of bedside exam  > Consider IR consult for definitive embolization if large volume bleed  > If bleeding remains difficult to control despite interventions, contact ENT on call  > Abx for TSS ppx while dressing remains in place: Augmentin 875-125 BID  > Nasal saline q2 while awake  > Vaseline to anterior nares at night  > If patient is stable for discharge in the next 1-2 days, would recommend that the patient follow up in ENT clinic in 5-7 days to have rhinorockets removed (ENT will arrange outpatient follow up for removal)  -- Additional NS 1L infusion given limited PO intake over last day     Left eyebrow laceration s/p repair  Secondary to recent fall. Was sutured in the ED on 3/31.  -- Sutures appear  "ready to removed soon, deferred at this time given agitation     HTN  -- Continue PTA coreg 3.125 BID  -- HOLD PTA ASA given acute bleeding and likely need for nasal packings for several days  -- HOLD PTA Lasix in setting of acute bleeding and decreased PO intake  -- Continue PTA Statin     T2DM  -- HOLD PTA Metformin and Glipizide  -- MSSI  -- ACHS gluc checks     Acute Agitation  Anxiety  Dementia/Parkinson's disease  Family reports that patient is baseline A&Ox4, usually not aggressive.   -- Continue Precedex gtt at this time  -- Continue PTA Sinemet 25/100 TID     BPH  -- Continue PTA Flomax  -- Continue PTA Myrbetrig    To note, lives at home with wife.        Diet: Advance Diet as Tolerated: Clear Liquid Diet; Regular Diet Adult    DVT Prophylaxis: Pneumatic Compression Devices  Jung Catheter: Not present  Lines: None     Cardiac Monitoring: None  Code Status: Full Code      Clinically Significant Risk Factors         # Hypernatremia: Highest Na = 146 mmol/L in last 2 days, will monitor as appropriate  # Hyperchloremia: Highest Cl = 111 mmol/L in last 2 days, will monitor as appropriate              # Hypertension: Noted on problem list            # Obesity: Estimated body mass index is 35.51 kg/m  as calculated from the following:    Height as of an earlier encounter on 4/5/25: 1.676 m (5' 6\").    Weight as of an earlier encounter on 4/5/25: 99.8 kg (220 lb)., PRESENT ON ADMISSION            Social Drivers of Health    Tobacco Use: Medium Risk (3/24/2025)    Received from [facility]    Patient History     Smoking Tobacco Use: Former     Smokeless Tobacco Use: Never          Disposition Plan     Medically Ready for Discharge: 1-2 days    Niles Mark DO, MPH  Internal Medicine-Pediatrics Hospitalist  Hospitalist Service, GOLD TEAM 8  [facility]  Securely message with [employer] (more info)  Text page via [employer] Paging/Directory "   See signed in provider for up to date coverage information  ______________________________________________________________________    Interval History     Reports the sensation of something dripping down the back of his throat.  Pain well controlled as long as no one touches his nose.  Amenable to having ENT assess his nose again.        Physical Exam   Vital Signs: Temp: 98.7  F (37.1  C) Temp src: Oral BP: 109/64 Pulse: 82   Resp: 20 SpO2: 97 % O2 Device: None (Room air) Oxygen Delivery: 2 LPM  Weight: 0 lbs 0 oz    Constitutional:       General: He is not in acute distress.     Appearance: He is not diaphoretic.   HENT:      Head:      Comments: Significant facial bruising, more so over left upper face and nose. BL Rhinorockets in place, no current signs of bleed through. Airway intact. Clean and dry sutures in place in medial left eyebrow.    Cardiovascular:      Rate and Rhythm: Normal rate and regular rhythm.      Heart sounds: No murmur heard.  Pulmonary:      Effort: Pulmonary effort is normal.      Breath sounds: No wheezing, rhonchi or rales.   Skin:     General: Skin is warm and dry.   Neurological:      Mental Status: He is alert. No agitated.     Comments: Able to answer my questions this morning.      Medical Decision Making         Data     I have personally reviewed the following data over the past 24 hrs:    11.5 (H)  \   11.7 (L)   / 167     146 (H) 111 (H) 33.5 (H) /  129 (H)   3.6 23 0.87 \     ALT: 13 AST: 21 AP: 61 TBILI: 0.8   ALB: 3.7 TOT PROTEIN: 6.0 (L) LIPASE: N/A       Imaging results reviewed over the past 24 hrs:   No results found for this or any previous visit (from the past 24 hours).

## 2025-04-06 NOTE — PLAN OF CARE
Problem: Adult Inpatient Plan of Care  Goal: Optimal Comfort and Wellbeing  Outcome: Progressing     Problem: Delirium  Goal: Improved Sleep  Outcome: Progressing      Problem: Adult Inpatient Plan of Care  Goal: Absence of Hospital-Acquired Illness or Injury  Outcome: Progressing   Goal Outcome Evaluation:      Plan of Care Reviewed With: patient, spouse    Overall Patient Progress: improvingOverall Patient Progress: improving

## 2025-04-06 NOTE — PHARMACY-ADMISSION MEDICATION HISTORY
Pharmacy Intern Admission Medication History    Admission medication history is complete. The information provided in this note is only as accurate as the sources available at the time of the update.    Information Source(s): Family member and CareEverywhere/SureScripts via in-person    Pertinent Information:   Upon my visit, patient's wife was there and gave me a sheet of paper with all the home medications information, and she also shared with me some other information about them:  They haven't picked up the amoxicillin yet because they have been in the hospitals for a long time.   Per wife, patient is taking buspirone as-needed at this time    Changes made to PTA medication list:  Added:   Vitamin B-12  Carbidopa-levodopa  Carvedilol  Myrbetriq  Deleted:   Furosemide tablet  Glipizide tablet  Lisinopril tablet  Red Yeast Rice Extract Tablets  Sertraline tablet   Changed:   Updated the Sig on Aspirin, Vitamin C and Vitamin E.   Sig for DOVONEX and EFUNEX cream as they are as needed now.   Strength and Sig for Cinnamon --> 1000 mg capsule  Sig for Flonase nasal spray --> Daily as needed.  Sig on FOCUS tablets --> 2 gummies a day      Allergies reviewed with patient and updates made in EHR: yes    Medication History Completed By: Lidia Thompson 4/6/2025 6:31 PM    Prior to Admission medications    Medication Sig Last Dose Taking? Auth Provider Long Term End Date   ASPIRIN 81 MG OR TABS Take 1 tablet by mouth daily. 4/5/2025 Yes Reported, Patient     busPIRone (BUSPAR) 5 MG tablet TAKE 1 TABLET BY MOUTH EVERY 8 HOURS AS NEEDED FOR ANXIETY  Yes Sandy Clay MD Yes    calcipotriene (DOVONEX) 0.005 % external cream Mix with efudex and apply twice daily to scalp for 12 days  Patient taking differently: Apply topically 2 times daily as needed. Mix with efudex and apply twice daily to scalp for 12 days  Yes Javier Bueno MD     carbidopa-levodopa (SINEMET)  MG tablet Take 1 tablet by mouth 3 times daily.  4/5/2025 Yes Unknown, Entered By History     carvedilol (COREG) 3.125 MG tablet Take 3.125 mg by mouth 2 times daily (with meals). 4/5/2025 Yes Unknown, Entered By History     CINNAMON PO Take 1,000 mg by mouth daily. Take one 1000 mg capsule by mouth daily 4/5/2025 Yes Racheal Morrow APRN CNP     cyanocobalamin (VITAMIN B-12) 500 MCG tablet Take 1 tablet by mouth daily. 4/5/2025 Yes Unknown, Entered By History     fluorouracil (EFUDEX) 5 % external cream Mix with dovonex and apply twice daily to scalp for 12 days  Patient taking differently: Apply topically 2 times daily as needed. Mix with dovonex and apply twice daily to scalp for 12 days  Yes Javier Bueno MD     fluticasone (FLONASE) 50 MCG/ACT nasal spray Spray 2 sprays into both nostrils daily  Patient taking differently: Spray 2 sprays into both nostrils daily as needed.  Yes Erich Hall MD     metFORMIN (GLUCOPHAGE) 1000 MG tablet TAKE 1 TABLET BY MOUTH TWICE DAILY WITH MEALS 4/6/2025 Yes Virginie Lan MD Yes    mirabegron (MYRBETRIQ) 50 MG 24 hr tablet Take 1 tablet by mouth daily. 4/5/2025 Yes Unknown, Entered By History     Misc Natural Products (TOTAL MEMORY & FOCUS FORMULA) TABS Take 2 Gum by mouth daily. 4/5/2025 Yes Rian Llanos MD     rosuvastatin (CRESTOR) 20 MG tablet Take 1 tablet by mouth once daily 4/5/2025 Yes Sandy Clay MD Yes    tamsulosin (FLOMAX) 0.4 MG capsule Take 1 capsule (0.4 mg) by mouth daily 4/5/2025 Yes FUAD Armando MD     VITAMIN C 500 MG PO TABS Take 1 tablet by mouth daily. 4/5/2025 Yes Laura Mo MD     VITAMIN E Take 400 Units by mouth daily. 4/5/2025 Yes Reported, Patient     amoxicillin-clavulanate (AUGMENTIN) 875-125 MG tablet Take 1 tablet by mouth 2 times daily.  Patient not taking: Reported on 4/6/2025 Not Taking  Nordby, Rebecca M, DO     blood glucose (ACCU-CHEK SMARTVIEW) test strip Use to test blood sugar 2 times daily or as directed.   Sandy Clay  MD MITCHELL     blood glucose (ACCU-CHEK SMARTVIEW) test strip Use to test blood sugar 1 times daily or as directed.   Racheal Morrow APRN CNP     blood glucose monitoring (ACCU-CHEK FASTCLIX) lancets 1 each 2 times daily Use to test blood sugar 2 times daily or as directed.   Sandy Clay MD

## 2025-04-06 NOTE — PLAN OF CARE
VSS on RA.    Goal Outcome Evaluation:      Plan of Care Reviewed With: patient    Overall Patient Progress: improvingOverall Patient Progress: improving    Outcome Evaluation: VSS on RA. Sitter at bedside      Problem: Adult Inpatient Plan of Care  Goal: Plan of Care Review  Description: The Plan of Care Review/Shift note should be completed every shift.  The Outcome Evaluation is a brief statement about your assessment that the patient is improving, declining, or no change.  This information will be displayed automatically on your shiftnote.  Outcome: Progressing  Flowsheets (Taken 4/6/2025 4684)  Outcome Evaluation: VSS on RA. Sitter at bedside  Plan of Care Reviewed With: patient  Overall Patient Progress: improving     Problem: Seclusion/Restraint, Behavioral  Goal: Seclusion/Behavioral Restraint Goal: Absence of Harm or Injury  Outcome: Progressing  Intervention: Protect Skin and Joint Integrity  Recent Flowsheet Documentation  Taken 4/6/2025 0896 by Maximino Shelley, RN  Body Position:   turned   left

## 2025-04-07 LAB
ALBUMIN SERPL BCG-MCNC: 3.7 G/DL (ref 3.5–5.2)
ALP SERPL-CCNC: 67 U/L (ref 40–150)
ALT SERPL W P-5'-P-CCNC: 7 U/L (ref 0–70)
ANION GAP SERPL CALCULATED.3IONS-SCNC: 14 MMOL/L (ref 7–15)
AST SERPL W P-5'-P-CCNC: 27 U/L (ref 0–45)
BILIRUB SERPL-MCNC: 0.9 MG/DL
BUN SERPL-MCNC: 20.8 MG/DL (ref 8–23)
CALCIUM SERPL-MCNC: 9 MG/DL (ref 8.8–10.4)
CHLORIDE SERPL-SCNC: 108 MMOL/L (ref 98–107)
CREAT SERPL-MCNC: 0.85 MG/DL (ref 0.67–1.17)
EGFRCR SERPLBLD CKD-EPI 2021: 90 ML/MIN/1.73M2
ERYTHROCYTE [DISTWIDTH] IN BLOOD BY AUTOMATED COUNT: 13.3 % (ref 10–15)
GLUCOSE BLDC GLUCOMTR-MCNC: 117 MG/DL (ref 70–99)
GLUCOSE BLDC GLUCOMTR-MCNC: 131 MG/DL (ref 70–99)
GLUCOSE BLDC GLUCOMTR-MCNC: 139 MG/DL (ref 70–99)
GLUCOSE BLDC GLUCOMTR-MCNC: 146 MG/DL (ref 70–99)
GLUCOSE BLDC GLUCOMTR-MCNC: 157 MG/DL (ref 70–99)
GLUCOSE BLDC GLUCOMTR-MCNC: 159 MG/DL (ref 70–99)
GLUCOSE SERPL-MCNC: 150 MG/DL (ref 70–99)
HCO3 SERPL-SCNC: 23 MMOL/L (ref 22–29)
HCT VFR BLD AUTO: 33.8 % (ref 40–53)
HGB BLD-MCNC: 11.5 G/DL (ref 13.3–17.7)
MCH RBC QN AUTO: 31 PG (ref 26.5–33)
MCHC RBC AUTO-ENTMCNC: 34 G/DL (ref 31.5–36.5)
MCV RBC AUTO: 91 FL (ref 78–100)
PLATELET # BLD AUTO: 144 10E3/UL (ref 150–450)
POTASSIUM SERPL-SCNC: 3.5 MMOL/L (ref 3.4–5.3)
PROT SERPL-MCNC: 6.1 G/DL (ref 6.4–8.3)
RBC # BLD AUTO: 3.71 10E6/UL (ref 4.4–5.9)
SODIUM SERPL-SCNC: 145 MMOL/L (ref 135–145)
WBC # BLD AUTO: 10.3 10E3/UL (ref 4–11)

## 2025-04-07 PROCEDURE — 84460 ALANINE AMINO (ALT) (SGPT): CPT

## 2025-04-07 PROCEDURE — 30901 CONTROL OF NOSEBLEED: CPT | Mod: RT | Performed by: PHYSICIAN ASSISTANT

## 2025-04-07 PROCEDURE — 250N000013 HC RX MED GY IP 250 OP 250 PS 637: Performed by: HOSPITALIST

## 2025-04-07 PROCEDURE — 250N000011 HC RX IP 250 OP 636

## 2025-04-07 PROCEDURE — 250N000009 HC RX 250: Performed by: FAMILY MEDICINE

## 2025-04-07 PROCEDURE — 99232 SBSQ HOSP IP/OBS MODERATE 35: CPT | Performed by: STUDENT IN AN ORGANIZED HEALTH CARE EDUCATION/TRAINING PROGRAM

## 2025-04-07 PROCEDURE — 250N000013 HC RX MED GY IP 250 OP 250 PS 637

## 2025-04-07 PROCEDURE — 250N000009 HC RX 250

## 2025-04-07 PROCEDURE — 36415 COLL VENOUS BLD VENIPUNCTURE: CPT

## 2025-04-07 PROCEDURE — 84155 ASSAY OF PROTEIN SERUM: CPT

## 2025-04-07 PROCEDURE — 82962 GLUCOSE BLOOD TEST: CPT

## 2025-04-07 PROCEDURE — 99232 SBSQ HOSP IP/OBS MODERATE 35: CPT | Mod: 25 | Performed by: PHYSICIAN ASSISTANT

## 2025-04-07 PROCEDURE — G0463 HOSPITAL OUTPT CLINIC VISIT: HCPCS | Mod: 25

## 2025-04-07 PROCEDURE — 120N000003 HC R&B IMCU UMMC

## 2025-04-07 PROCEDURE — 85027 COMPLETE CBC AUTOMATED: CPT

## 2025-04-07 RX ORDER — SALIVA STIMULANT COMB. NO.3
1 SPRAY, NON-AEROSOL (ML) MUCOUS MEMBRANE 4 TIMES DAILY PRN
Status: DISCONTINUED | OUTPATIENT
Start: 2025-04-07 | End: 2025-04-12 | Stop reason: HOSPADM

## 2025-04-07 RX ADMIN — Medication 1 SPRAY: at 07:01

## 2025-04-07 RX ADMIN — CARBIDOPA AND LEVODOPA 1 TABLET: 25; 100 TABLET, EXTENDED RELEASE ORAL at 15:39

## 2025-04-07 RX ADMIN — AMPICILLIN SODIUM AND SULBACTAM SODIUM 3 G: 2; 1 INJECTION, POWDER, FOR SOLUTION INTRAMUSCULAR; INTRAVENOUS at 10:12

## 2025-04-07 RX ADMIN — SALINE NASAL SPRAY 1 SPRAY: 1.5 SOLUTION NASAL at 22:22

## 2025-04-07 RX ADMIN — OXYMETAZOLINE HCL 2 SPRAY: 0.05 SPRAY NASAL at 13:25

## 2025-04-07 RX ADMIN — SALINE NASAL SPRAY 1 SPRAY: 1.5 SOLUTION NASAL at 13:24

## 2025-04-07 RX ADMIN — SALINE NASAL SPRAY 1 SPRAY: 1.5 SOLUTION NASAL at 11:39

## 2025-04-07 RX ADMIN — DEXMEDETOMIDINE HYDROCHLORIDE 0.6 MCG/KG/HR: 4 INJECTION, SOLUTION INTRAVENOUS at 10:03

## 2025-04-07 RX ADMIN — AMPICILLIN SODIUM AND SULBACTAM SODIUM 3 G: 2; 1 INJECTION, POWDER, FOR SOLUTION INTRAMUSCULAR; INTRAVENOUS at 22:18

## 2025-04-07 RX ADMIN — SALINE NASAL SPRAY 1 SPRAY: 1.5 SOLUTION NASAL at 15:38

## 2025-04-07 RX ADMIN — SALINE NASAL SPRAY 1 SPRAY: 1.5 SOLUTION NASAL at 17:41

## 2025-04-07 RX ADMIN — NYSTATIN: 100000 CREAM TOPICAL at 11:39

## 2025-04-07 RX ADMIN — INSULIN ASPART 1 UNITS: 100 INJECTION, SOLUTION INTRAVENOUS; SUBCUTANEOUS at 22:25

## 2025-04-07 RX ADMIN — AMPICILLIN SODIUM AND SULBACTAM SODIUM 3 G: 2; 1 INJECTION, POWDER, FOR SOLUTION INTRAMUSCULAR; INTRAVENOUS at 15:33

## 2025-04-07 RX ADMIN — INSULIN ASPART 1 UNITS: 100 INJECTION, SOLUTION INTRAVENOUS; SUBCUTANEOUS at 15:37

## 2025-04-07 RX ADMIN — CARBIDOPA AND LEVODOPA 1 TABLET: 25; 100 TABLET, EXTENDED RELEASE ORAL at 19:22

## 2025-04-07 RX ADMIN — NYSTATIN: 100000 CREAM TOPICAL at 19:24

## 2025-04-07 RX ADMIN — SALINE NASAL SPRAY 1 SPRAY: 1.5 SOLUTION NASAL at 10:13

## 2025-04-07 RX ADMIN — AMPICILLIN SODIUM AND SULBACTAM SODIUM 3 G: 2; 1 INJECTION, POWDER, FOR SOLUTION INTRAMUSCULAR; INTRAVENOUS at 04:44

## 2025-04-07 RX ADMIN — CARBIDOPA AND LEVODOPA 1 TABLET: 25; 100 TABLET, EXTENDED RELEASE ORAL at 10:04

## 2025-04-07 RX ADMIN — Medication: at 22:21

## 2025-04-07 RX ADMIN — SALINE NASAL SPRAY 1 SPRAY: 1.5 SOLUTION NASAL at 06:28

## 2025-04-07 RX ADMIN — DEXMEDETOMIDINE HYDROCHLORIDE 0.6 MCG/KG/HR: 4 INJECTION, SOLUTION INTRAVENOUS at 03:03

## 2025-04-07 RX ADMIN — SALINE NASAL SPRAY 1 SPRAY: 1.5 SOLUTION NASAL at 19:24

## 2025-04-07 RX ADMIN — SALINE NASAL SPRAY 1 SPRAY: 1.5 SOLUTION NASAL at 09:20

## 2025-04-07 RX ADMIN — INSULIN ASPART 1 UNITS: 100 INJECTION, SOLUTION INTRAVENOUS; SUBCUTANEOUS at 11:38

## 2025-04-07 RX ADMIN — DEXMEDETOMIDINE HYDROCHLORIDE 0.2 MCG/KG/HR: 4 INJECTION, SOLUTION INTRAVENOUS at 21:53

## 2025-04-07 ASSESSMENT — ACTIVITIES OF DAILY LIVING (ADL)
ADLS_ACUITY_SCORE: 72
ADLS_ACUITY_SCORE: 64
ADLS_ACUITY_SCORE: 72
ADLS_ACUITY_SCORE: 64
ADLS_ACUITY_SCORE: 72
ADLS_ACUITY_SCORE: 64
ADLS_ACUITY_SCORE: 72
ADLS_ACUITY_SCORE: 64
ADLS_ACUITY_SCORE: 72
ADLS_ACUITY_SCORE: 72
ADLS_ACUITY_SCORE: 64

## 2025-04-07 NOTE — PROGRESS NOTES
Saw patient as according to RN, patient is pulling at his lines and needed to be placed on Mitts  On my assessment, he is sleepy and mumbling. Otherwise, keeps attempting to pull at his lines. Sitter at bedside  Appropriate to continue Mitts    He is continuing at 0.6mcg/hr of dexmedtomidine. Wean as able. RN to check bladder scan (200 post-incontinence), Temperature (99.7), and BGS (135).  RN also wanted me to look at his toe. Wound care c/s placed for wound on Left great toe

## 2025-04-07 NOTE — PROGRESS NOTES
Brief Medicine Note:    Precedex weaned in hopes of discharging patient tomorrow.  Becoming increasingly agitated again. Recommended resuming precedex for tonight. Will have Psychiatry see in AM to help with next steps in hopes to discharge patient.    SHEREE SinhaC  Internal Medicine NAEEM Hospitalist  [facility]
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
## 2025-04-07 NOTE — PROGRESS NOTES
Pt becoming increasingly agitated and unable to be redirected. Pt sliding down in bed in attempt to get out. Precedex was shut off at 1725.  Bedside sitter remains   Page sent to PA on primary team. Awaiting response

## 2025-04-07 NOTE — ED NOTES
Pt received from previous nurse at 1900. Pt had urinated and defecated. Sitter and NST on floor assisted in clean up of pt. Pt on Precedex drip at 0.4 mcg/kg/hr for a total of 10 mL/hr. Attempted wean earlier as per previous nurse and pt became agitated, attempting to pull out Rhino rockets. Pt has Rhino rocket on R side with large amount of dark hardened blood crusted over rhino. Chest appears slightly congested. Pt abdo soft and nontender. Primafit on pt and collecting urine.   Pt responding appropriately and able to swallow pills with apple sauce.

## 2025-04-07 NOTE — PROGRESS NOTES
Otolaryngology Progress Note  April 7, 2025    S: No acute events overnight. Continues on precedex, mitts in place, with bedside attendant. No report of obvious bleeding anteriorly or spitting up blood through the mouth overnight.     O: BP (!) 141/74   Pulse 74   Temp 99.4  F (37.4  C) (Oral)   Resp 20   SpO2 95%    General: somnolent, on precedex, intermittently responsive and refuses to follow commands.No acute distress   HEENT: B/l rapid rhino balloons in place. Dried blood crusting around the left side. Hard palate with dried blood. Oropharynx with limited view, but no active bleeding observed dripping in the oropharynx. Right rapid rhino removed. Limited scope exam performed due to patient intolerance/confusion. There was mild oozing diffusely where the balloon had been sitting along the inferior septum and the medial inferior turbinate. A posisep was placed and saturated with Afrin.    Pulmonary: Breathing non-labored, no stridor, no accessory muscle use.    LABS:  ROUTINE IP LABS (Last four results)  BMP  Recent Labs   Lab 04/07/25  1136 04/07/25  0736 04/07/25  0634 04/07/25  0217 04/06/25  1140 04/06/25  0605 04/05/25  1037 04/05/25  0738   NA  --  145  --   --   --  146*  --  141   POTASSIUM  --  3.5  --   --   --  3.6  --  4.4   CHLORIDE  --  108*  --   --   --  111*  --  107   RABIA  --  9.0  --   --   --  9.0  --  9.2   CO2  --  23  --   --   --  23  --  22   BUN  --  20.8  --   --   --  33.5*  --  27.0*   CR  --  0.85  --   --   --  0.87  --  0.87   * 150* 139* 131*   < > 122*   < > 206*    < > = values in this interval not displayed.     CBC  Recent Labs   Lab 04/07/25  0736 04/06/25  1825 04/06/25  0605 04/05/25  1315 04/05/25  0738   WBC 10.3  --  11.5* 10.6 11.8*   RBC 3.71*  --  3.85* 4.01* 4.01*   HGB 11.5* 11.8* 11.7* 12.4* 12.4*   HCT 33.8*  --  35.1* 38.3* 37.5*   MCV 91  --  91 96 94   MCH 31.0  --  30.4 30.9 30.9   MCHC 34.0  --  33.3 32.4 33.1   RDW 13.3  --  13.6 14.3 13.3   PLT  144*  --  167 175 185     INR  Recent Labs   Lab 04/05/25  0738   INR 1.14       A/P: Fco Arroyo is a 75 year old male with a past medical history of HTN, T2DM, anxiety, and dementia who presents as self-transfer from outside ED for further evaluation of intermittent epistaxis in the setting of recent fall 5 days ago (3/31) w/ resulting nasal bone fractures. On initial evaluation patient appeared to be hemostatic with bilateral rapid rhino balloons in place.      - Posisep in place in the right nasal passage - this is dissolvable and does not need to be removed.   - recommend leaving left nasal balloon in place for now. If no further bleeding, consider balloon deflation/removal tomorrow.   - Precedex/sedation management per primary team. Patient will need close monitoring to ensure he does not pull out the nasal packing himself given confusion  - Minimize pressure on nasal ala - balloon currently compressible  - Antibiotics while non absorbable nasal packing is in place for toxic shock syndrome prophylaxis - currently on IV Unasyn  - Nasal saline q 2 hours while awake around the nasal packing to prevent denuding of nasal mucosa while non absorbable packing is in place  - Vaseline or water based lubricant to anterior nares at night  -If patient develops recurrent bright red bleeding around rapid rhino, consider IV TXA if patient will continues to be combative and intolerant of bedside exam  -Otherwise consult IR for definitive embolization if concerns for large volumes of epistaxis  - If patient experiences brisk bleeding, spray Afrin directly onto the rhinorockets and hold digital pressure on bilateral nostrils for 20 minutes. If bleeding continues after 20 minutes of digital pressure, repeat the above steps. If this fails to control the bleeding at that time, call ENT for further recommendations.      Above plan to be discussed with staff Attending Dr. Frankel.    Clinically Significant Risk Factors         #  "Hypernatremia: Highest Na = 146 mmol/L in last 2 days, will monitor as appropriate  # Hyperchloremia: Highest Cl = 111 mmol/L in last 2 days, will monitor as appropriate              # Hypertension: Noted on problem list            # Obesity: Estimated body mass index is 35.51 kg/m  as calculated from the following:    Height as of an earlier encounter on 4/5/25: 1.676 m (5' 6\").    Weight as of an earlier encounter on 4/5/25: 99.8 kg (220 lb)., PRESENT ON ADMISSION            Carlene Young PA-C  Otolaryngology-Head & Neck Surgery  Please contact ENT with questions by dialing [phone] and entering job code [phone] when prompted.    "

## 2025-04-08 LAB
ALBUMIN SERPL BCG-MCNC: 3.8 G/DL (ref 3.5–5.2)
ALP SERPL-CCNC: 73 U/L (ref 40–150)
ALT SERPL W P-5'-P-CCNC: 27 U/L (ref 0–70)
ANION GAP SERPL CALCULATED.3IONS-SCNC: 12 MMOL/L (ref 7–15)
AST SERPL W P-5'-P-CCNC: 56 U/L (ref 0–45)
BILIRUB SERPL-MCNC: 0.9 MG/DL
BUN SERPL-MCNC: 18 MG/DL (ref 8–23)
CALCIUM SERPL-MCNC: 9.1 MG/DL (ref 8.8–10.4)
CHLORIDE SERPL-SCNC: 107 MMOL/L (ref 98–107)
CREAT SERPL-MCNC: 0.86 MG/DL (ref 0.67–1.17)
EGFRCR SERPLBLD CKD-EPI 2021: 90 ML/MIN/1.73M2
ERYTHROCYTE [DISTWIDTH] IN BLOOD BY AUTOMATED COUNT: 13.3 % (ref 10–15)
GLUCOSE BLDC GLUCOMTR-MCNC: 116 MG/DL (ref 70–99)
GLUCOSE BLDC GLUCOMTR-MCNC: 121 MG/DL (ref 70–99)
GLUCOSE BLDC GLUCOMTR-MCNC: 124 MG/DL (ref 70–99)
GLUCOSE BLDC GLUCOMTR-MCNC: 126 MG/DL (ref 70–99)
GLUCOSE BLDC GLUCOMTR-MCNC: 127 MG/DL (ref 70–99)
GLUCOSE BLDC GLUCOMTR-MCNC: 144 MG/DL (ref 70–99)
GLUCOSE SERPL-MCNC: 113 MG/DL (ref 70–99)
HCO3 SERPL-SCNC: 24 MMOL/L (ref 22–29)
HCT VFR BLD AUTO: 34.9 % (ref 40–53)
HGB BLD-MCNC: 11.7 G/DL (ref 13.3–17.7)
MCH RBC QN AUTO: 31.3 PG (ref 26.5–33)
MCHC RBC AUTO-ENTMCNC: 33.5 G/DL (ref 31.5–36.5)
MCV RBC AUTO: 93 FL (ref 78–100)
PLATELET # BLD AUTO: 166 10E3/UL (ref 150–450)
POTASSIUM SERPL-SCNC: 3.6 MMOL/L (ref 3.4–5.3)
PROT SERPL-MCNC: 6.3 G/DL (ref 6.4–8.3)
RBC # BLD AUTO: 3.74 10E6/UL (ref 4.4–5.9)
SODIUM SERPL-SCNC: 143 MMOL/L (ref 135–145)
WBC # BLD AUTO: 9.2 10E3/UL (ref 4–11)

## 2025-04-08 PROCEDURE — 84460 ALANINE AMINO (ALT) (SGPT): CPT

## 2025-04-08 PROCEDURE — 250N000009 HC RX 250: Performed by: FAMILY MEDICINE

## 2025-04-08 PROCEDURE — 84155 ASSAY OF PROTEIN SERUM: CPT

## 2025-04-08 PROCEDURE — 82565 ASSAY OF CREATININE: CPT

## 2025-04-08 PROCEDURE — 250N000013 HC RX MED GY IP 250 OP 250 PS 637

## 2025-04-08 PROCEDURE — 99418 PROLNG IP/OBS E/M EA 15 MIN: CPT | Performed by: PSYCHIATRY & NEUROLOGY

## 2025-04-08 PROCEDURE — 120N000003 HC R&B IMCU UMMC

## 2025-04-08 PROCEDURE — 99232 SBSQ HOSP IP/OBS MODERATE 35: CPT | Performed by: HOSPITALIST

## 2025-04-08 PROCEDURE — 250N000011 HC RX IP 250 OP 636

## 2025-04-08 PROCEDURE — 99223 1ST HOSP IP/OBS HIGH 75: CPT | Performed by: PSYCHIATRY & NEUROLOGY

## 2025-04-08 PROCEDURE — 85027 COMPLETE CBC AUTOMATED: CPT

## 2025-04-08 PROCEDURE — 36415 COLL VENOUS BLD VENIPUNCTURE: CPT

## 2025-04-08 PROCEDURE — 82962 GLUCOSE BLOOD TEST: CPT

## 2025-04-08 PROCEDURE — 250N000013 HC RX MED GY IP 250 OP 250 PS 637: Performed by: HOSPITALIST

## 2025-04-08 PROCEDURE — 99231 SBSQ HOSP IP/OBS SF/LOW 25: CPT | Performed by: PHYSICIAN ASSISTANT

## 2025-04-08 RX ORDER — RISPERIDONE 0.25 MG/1
0.25 TABLET ORAL 2 TIMES DAILY
Status: DISCONTINUED | OUTPATIENT
Start: 2025-04-08 | End: 2025-04-12 | Stop reason: HOSPADM

## 2025-04-08 RX ORDER — BUSPIRONE HYDROCHLORIDE 5 MG/1
5 TABLET ORAL 2 TIMES DAILY
Status: DISCONTINUED | OUTPATIENT
Start: 2025-04-08 | End: 2025-04-12 | Stop reason: HOSPADM

## 2025-04-08 RX ADMIN — BUSPIRONE HYDROCHLORIDE 5 MG: 5 TABLET ORAL at 19:46

## 2025-04-08 RX ADMIN — SALINE NASAL SPRAY 1 SPRAY: 1.5 SOLUTION NASAL at 12:30

## 2025-04-08 RX ADMIN — RISPERIDONE 0.25 MG: 0.25 TABLET, FILM COATED ORAL at 19:45

## 2025-04-08 RX ADMIN — CARBIDOPA AND LEVODOPA 1 TABLET: 25; 100 TABLET, EXTENDED RELEASE ORAL at 15:37

## 2025-04-08 RX ADMIN — CARVEDILOL 3.12 MG: 3.12 TABLET, FILM COATED ORAL at 18:26

## 2025-04-08 RX ADMIN — SALINE NASAL SPRAY 1 SPRAY: 1.5 SOLUTION NASAL at 05:32

## 2025-04-08 RX ADMIN — ACETAMINOPHEN 650 MG: 325 TABLET, FILM COATED ORAL at 20:29

## 2025-04-08 RX ADMIN — CARBIDOPA AND LEVODOPA 1 TABLET: 25; 100 TABLET, EXTENDED RELEASE ORAL at 19:46

## 2025-04-08 RX ADMIN — AMPICILLIN SODIUM AND SULBACTAM SODIUM 3 G: 2; 1 INJECTION, POWDER, FOR SOLUTION INTRAMUSCULAR; INTRAVENOUS at 03:20

## 2025-04-08 RX ADMIN — NYSTATIN: 100000 CREAM TOPICAL at 19:48

## 2025-04-08 RX ADMIN — CARBIDOPA AND LEVODOPA 1 TABLET: 25; 100 TABLET, EXTENDED RELEASE ORAL at 09:49

## 2025-04-08 RX ADMIN — INSULIN ASPART 1 UNITS: 100 INJECTION, SOLUTION INTRAVENOUS; SUBCUTANEOUS at 12:38

## 2025-04-08 RX ADMIN — AMPICILLIN SODIUM AND SULBACTAM SODIUM 3 G: 2; 1 INJECTION, POWDER, FOR SOLUTION INTRAMUSCULAR; INTRAVENOUS at 21:50

## 2025-04-08 RX ADMIN — AMPICILLIN SODIUM AND SULBACTAM SODIUM 3 G: 2; 1 INJECTION, POWDER, FOR SOLUTION INTRAMUSCULAR; INTRAVENOUS at 15:37

## 2025-04-08 RX ADMIN — ROSUVASTATIN CALCIUM 20 MG: 20 TABLET, FILM COATED ORAL at 09:51

## 2025-04-08 RX ADMIN — SALINE NASAL SPRAY 1 SPRAY: 1.5 SOLUTION NASAL at 09:49

## 2025-04-08 RX ADMIN — TAMSULOSIN HYDROCHLORIDE 0.4 MG: 0.4 CAPSULE ORAL at 09:51

## 2025-04-08 RX ADMIN — MIRABEGRON 50 MG: 50 TABLET, EXTENDED RELEASE ORAL at 09:51

## 2025-04-08 RX ADMIN — SALINE NASAL SPRAY 1 SPRAY: 1.5 SOLUTION NASAL at 08:04

## 2025-04-08 RX ADMIN — NYSTATIN: 100000 CREAM TOPICAL at 09:49

## 2025-04-08 RX ADMIN — DEXMEDETOMIDINE HYDROCHLORIDE 0.2 MCG/KG/HR: 4 INJECTION, SOLUTION INTRAVENOUS at 16:02

## 2025-04-08 RX ADMIN — CARVEDILOL 3.12 MG: 3.12 TABLET, FILM COATED ORAL at 09:49

## 2025-04-08 RX ADMIN — AMPICILLIN SODIUM AND SULBACTAM SODIUM 3 G: 2; 1 INJECTION, POWDER, FOR SOLUTION INTRAMUSCULAR; INTRAVENOUS at 09:52

## 2025-04-08 RX ADMIN — Medication: at 21:51

## 2025-04-08 ASSESSMENT — ACTIVITIES OF DAILY LIVING (ADL)
ADLS_ACUITY_SCORE: 72
ADLS_ACUITY_SCORE: 68
ADLS_ACUITY_SCORE: 68
ADLS_ACUITY_SCORE: 70
ADLS_ACUITY_SCORE: 72
ADLS_ACUITY_SCORE: 72
ADLS_ACUITY_SCORE: 74
ADLS_ACUITY_SCORE: 74
ADLS_ACUITY_SCORE: 70
ADLS_ACUITY_SCORE: 68
ADLS_ACUITY_SCORE: 70
ADLS_ACUITY_SCORE: 74
ADLS_ACUITY_SCORE: 72
ADLS_ACUITY_SCORE: 72
ADLS_ACUITY_SCORE: 68
ADLS_ACUITY_SCORE: 68
ADLS_ACUITY_SCORE: 72
ADLS_ACUITY_SCORE: 74
ADLS_ACUITY_SCORE: 72
ADLS_ACUITY_SCORE: 72

## 2025-04-08 NOTE — PROGRESS NOTES
"~3:45am   Was notified that patient was having hallucinations. Per RN, was reporting that a little girl was sitting on his shoulder  We initially attempted to discontinue the Precedex as it is associated with hallucinations.  No improvement in hallucinations, instead patient became more agitated and moving around. Resumed precedex  On my interview, patient was asleep and mumbling out loud about people in his room.  When I woke him up, easily arousable, follows commands-was able to move all extremities equally.  While not engaged in the interview, he kept stating that there were people in his room that he wanted me to put in a box.  He was also asking me to remove his mitts.  He states he is in the \"hospital\", however reports that it is \"January 2004\", and answers \"Trump\" for president.   Plan:  -Patient has a psychiatry consult for this morning.  Continue Precedex at the current rate.  Patient already in mitts and has sitter in place~    Addendum:~7am  Patient ripped out his Rhino Rocket according to RN.  Nose is dry with no further evidence of bleeding.  ENT paged  "

## 2025-04-08 NOTE — PROGRESS NOTES
Otolaryngology Progress Note  April 8th, 2025    S: Patient took out his left rapid rhino balloon this morning. No reports of obvious active anterior bleeding post removal. Reports of patient hallucinating throughout the night. Continues on precedex with bedside attendant nearby.     O: /78   Pulse 80   Temp 97.8  F (36.6  C)   Resp 18   SpO2 99%    General: somnolent, on precedex, actively responsive and now follows commands. No acute distress.     HEENT: visible dissolvable packing in the right nasal passage, no blood. Oropharynx shows no active bleeding observed dripping in the oropharynx. Left rapid rhino removed. Small amount of bloody mucous seen throughout left nare, no active bleeding. Surgiflo was applied in the left nasal passage    Pulmonary: Breathing non-labored, no stridor, no accessory muscle use.    LABS:  ROUTINE IP LABS (Last four results)  BMP  Recent Labs   Lab 04/08/25  0802 04/08/25  0637 04/08/25  0621 04/08/25  0241 04/07/25  1136 04/07/25  0736 04/06/25  1140 04/06/25  0605 04/05/25  1037 04/05/25  0738   NA  --  143  --   --   --  145  --  146*  --  141   POTASSIUM  --  3.6  --   --   --  3.5  --  3.6  --  4.4   CHLORIDE  --  107  --   --   --  108*  --  111*  --  107   RABIA  --  9.1  --   --   --  9.0  --  9.0  --  9.2   CO2  --  24  --   --   --  23  --  23  --  22   BUN  --  18.0  --   --   --  20.8  --  33.5*  --  27.0*   CR  --  0.86  --   --   --  0.85  --  0.87  --  0.87   * 113* 127* 121*   < > 150*   < > 122*   < > 206*    < > = values in this interval not displayed.     CBC  Recent Labs   Lab 04/08/25  0637 04/07/25  0736 04/06/25  1825 04/06/25  0605 04/05/25  1315   WBC 9.2 10.3  --  11.5* 10.6   RBC 3.74* 3.71*  --  3.85* 4.01*   HGB 11.7* 11.5* 11.8* 11.7* 12.4*   HCT 34.9* 33.8*  --  35.1* 38.3*   MCV 93 91  --  91 96   MCH 31.3 31.0  --  30.4 30.9   MCHC 33.5 34.0  --  33.3 32.4   RDW 13.3 13.3  --  13.6 14.3    144*  --  167 175     INR  Recent Labs  "  Lab 04/05/25  0738   INR 1.14       A/P: Fco Arroyo is a 75 year old male with a past medical history of HTN, T2DM, anxiety, and dementia who presents as self-transfer from outside ED for further evaluation of intermittent epistaxis in the setting of recent fall 5 days ago (3/31) w/ resulting nasal bone fractures. On initial evaluation patient appeared to be hemostatic with bilateral rapid rhino balloons in place. Both rhino balloons have been taken out without any signs of active bleeding.     - Posisep in place in the right nasal passage - this is dissolvable and does not need to be removed.   - Surgiflo in place in the left nasal passage- this is dissolvable and does not need to be removed.  - Precedex/sedation management per primary team  - Ok to discontinue antibiotics as non absorbable nasal packing has been taken out.  - Start nasal saline spray to b/l nasal passages Q4H starting tomorrow. This will keep nasal mucosa moist and help dissolve the packing  - avoid nose blowing, sneeze and cough with mouth open  - Vaseline or water based lubricant to anterior nares at night  - If patient experiences brisk rebleeding, spray Afrin and hold digital pressure on bilateral nostrils for 20 minutes. If bleeding continues after 20 minutes of digital pressure, repeat the above steps. If this fails to control the bleeding at that time, call ENT for further recommendations.      ENT will be signing off. Call ENT for further questions or concerns     Above plan to be discussed with staff Attending Dr. Frankel.    Clinically Significant Risk Factors          # Hyperchloremia: Highest Cl = 108 mmol/L in last 2 days, will monitor as appropriate              # Hypertension: Noted on problem list            # Obesity: Estimated body mass index is 35.51 kg/m  as calculated from the following:    Height as of an earlier encounter on 4/5/25: 1.676 m (5' 6\").    Weight as of an earlier encounter on 4/5/25: 99.8 kg (220 lb)., " PRESENT ON ADMISSION            SHERRI Hill-S    The documentation recorded by the above mentioned medical student acting as scribe accurately reflects the services I personally performed and the decisions made by me.    SHEREE JamesC  Otolaryngology-Head & Neck Surgery  Please contact ENT with questions by dialing [phone] and entering job code [phone] when prompted.

## 2025-04-08 NOTE — PROGRESS NOTES
[facility]    Medicine Progress Note - Hospitalist Service, GOLD TEAM 8    Date of Admission:  4/5/2025    Assessment & Plan   Fco Arroyo is a 75 year old male admitted on 4/5/2025. He has PMH of HTN, T2DM, anxiety, and dementia who had recent fall on 3/31 resulting in nasal bone fractures. He has since had issues with epistaxis for which he has been to [facility] ED multiple times. He was most recently sent home with BL Rhinorockets in place though continued to have bleeding through these prompting ED visit today. However, during evaluations, patient became increasingly distressed and agitated requiring Code 21 call. Patient will be admitted for further management given risk of severe bleed if he was to pull the rhinorockets at home.     Epistaxis  Nasal bone fractures 2/2 GLF 3/31  Patient tripped and fell while walking into [facility] on 3/31, was seen at ED that night and found to have acute fractures of the nasal bones and bony nasal septum. He was able to be discharged home, though he represented to [facility] ED on 4/4 due to epistaxis. Repeat imaging was stable. It was noted on exam, bony septum was visible and concerning for open and displaced fracture. ENT recommended Augmentin at that time which was started and he was discharged home. He presented again early this morning to [facility] ED for ongoing bleeding, and decision was made to place bilateral 5.5cm Rhino Rocket. Unfortunately, he continued to bleed around the packing and had blood coming out of his eyes. Decision was made to transfer to [facility] for possible IR embolization. Upon presentation to [facility] ED, he had slight dripping of blood. He was seen by ENT however, no exam/intervention could be performed as patient became acutely agitated, requiring 0.5mg Ativan IV, Haldol 5mg IV, Code 21, restraints, and eventually Precedex drip for patient's safety. There is concern that if patient were to go home at this time, he may  pull out packing and have large volume bleed. Family agrees with admission for further definitive management.   -- Continue Precedex gtt prn to prevent patient from removing packing; mitts in place; planning for reevaluation by ENT in AM    ENT recs below:  - Posisep in place in the right nasal passage - this is dissolvable and does not need to be removed.   - recommend leaving left nasal balloon in place for now. If no further bleeding, consider balloon deflation/removal tomorrow.   - Precedex/sedation management per primary team. Patient will need close monitoring to ensure he does not pull out the nasal packing himself given confusion  - Minimize pressure on nasal ala - balloon currently compressible  - Antibiotics while non absorbable nasal packing is in place for toxic shock syndrome prophylaxis - currently on IV Unasyn  - Nasal saline q 2 hours while awake around the nasal packing to prevent denuding of nasal mucosa while non absorbable packing is in place  - Vaseline or water based lubricant to anterior nares at night  -If patient develops recurrent bright red bleeding around rapid rhino, consider IV TXA if patient will continues to be combative and intolerant of bedside exam  -Otherwise consult IR for definitive embolization if concerns for large volumes of epistaxis  - If patient experiences brisk bleeding, spray Afrin directly onto the rhinorockets and hold digital pressure on bilateral nostrils for 20 minutes. If bleeding continues after 20 minutes of digital pressure, repeat the above steps. If this fails to control the bleeding at that time, call ENT for further recommendations.     -- Continuous Pulse ox  -- Sitter present  -- Repeat hgb in AM; low threshold for repeat Hgb if rebleeding occurs     Left eyebrow laceration s/p repair  Secondary to recent fall. Was sutured in the ED on 3/31.  -- Sutures appear ready to removed soon, deferred at this time given agitation     HTN  -- Continue PTA coreg  "3.125 BID  -- HOLD PTA ASA given acute bleeding and likely need for nasal packings for several days  -- HOLD PTA Lasix in setting of acute bleeding and decreased PO intake  -- Continue PTA Statin     T2DM  -- HOLD PTA Metformin and Glipizide  -- MSSI  -- ACHS gluc checks     Acute Agitation  Anxiety  Dementia/Parkinson's disease  Family reports that patient is baseline A&Ox4, usually not aggressive.   -- Continue Precedex gtt at this time  -- Continue PTA Sinemet 25/100 TID     BPH  -- Continue PTA Flomax  -- Continue PTA Myrbetrig    To note, lives at home with wife.  Wife not at bedside during rounds today (or yesterday).          Diet: Advance Diet as Tolerated: Clear Liquid Diet; Regular Diet Adult    DVT Prophylaxis: Pneumatic Compression Devices  Jung Catheter: Not present  Lines: None     Cardiac Monitoring: None  Code Status: Full Code      Clinically Significant Risk Factors         # Hypernatremia: Highest Na = 146 mmol/L in last 2 days, will monitor as appropriate  # Hyperchloremia: Highest Cl = 111 mmol/L in last 2 days, will monitor as appropriate              # Hypertension: Noted on problem list            # Obesity: Estimated body mass index is 35.51 kg/m  as calculated from the following:    Height as of an earlier encounter on 4/5/25: 1.676 m (5' 6\").    Weight as of an earlier encounter on 4/5/25: 99.8 kg (220 lb)., PRESENT ON ADMISSION            Social Drivers of Health    Tobacco Use: Medium Risk (3/24/2025)    Received from [facility]    Patient History     Smoking Tobacco Use: Former     Smokeless Tobacco Use: Never          Disposition Plan     Medically Ready for Discharge: 1-2 days pending ENT reevaluation    Niles Mark DO, MPH  Internal Medicine-Pediatrics Hospitalist  Hospitalist Service, GOLD TEAM 8  [facility]  Securely message with [employer] (more info)  Text page via [employer] Paging/Directory   See " signed in provider for up to date coverage information  ______________________________________________________________________    Interval History     Sleeping.  Denies any pain when asked.      Physical Exam   Vital Signs: Temp: 99.2  F (37.3  C) Temp src: Axillary BP: 138/73 Pulse: 88   Resp: 20 SpO2: 100 % O2 Device: None (Room air)    Weight: 0 lbs 0 oz    Constitutional:       General: He is not in acute distress.     Appearance: He is not diaphoretic.   HENT:      Head:      Comments: Significant facial bruising, more so over left upper face and nose. BL Rhinorockets in place, no current signs of bleed out the front but dried blood in oral cavity. Airway intact. Clean and dry sutures in place in medial left eyebrow.    Cardiovascular:      Rate and Rhythm: Normal rate and regular rhythm.      Heart sounds: No murmur heard.  Pulmonary:      Effort: Pulmonary effort is normal.      Breath sounds: No wheezing, rhonchi or rales.   Skin:     General: Skin is warm and dry.   Neurological:      Mental Status: He is alert. No agitated.     Comments: Able to answer my questions this morning.      Medical Decision Making         Data     I have personally reviewed the following data over the past 24 hrs:    10.3  \   11.5 (L)   / 144 (L)     145 108 (H) 20.8 /  117 (H)   3.5 23 0.85 \     ALT: 7 AST: 27 AP: 67 TBILI: 0.9   ALB: 3.7 TOT PROTEIN: 6.1 (L) LIPASE: N/A       Imaging results reviewed over the past 24 hrs:   No results found for this or any previous visit (from the past 24 hours).

## 2025-04-08 NOTE — PROGRESS NOTES
Precedex was paused per order as the pt was hallucinating but no showing any sign of other neuro deficits.   Pt became more agitated and combative, refused  to draw blood and refused meds. Page sent to doctor on duty and agreed to resume Precedex gtt.

## 2025-04-08 NOTE — CONSULTS
"          Initial Psychiatric Consult   Consult date: April 8, 2025         Reason for Consult, requesting source:    AMS, VH, agitation   Requesting source: Mike Dominguez    Labs and imaging reviewed. Discussed with nursing     Total time spent in chart review, patient interview and coordination of care; 90 min         HPI:   From medicine note 4/7:   \"Fco Arroyo is a 75 year old male admitted on 4/5/2025. He has PMH of HTN, T2DM, anxiety, and dementia who had recent fall on 3/31 resulting in nasal bone fractures. He has since had issues with epistaxis for which he has been to [facility] ED multiple times. He was most recently sent home with BL Rhinorockets in place though continued to have bleeding through these prompting ED visit today. However, during evaluations, patient became increasingly distressed and agitated requiring Code 21 call. Patient will be admitted for further management given risk of severe bleed if he was to pull the rhinorockets at home.\"    From neurology note 1/8/24 (Dr Lily Alexander)  \"74-year-old gentleman who was diagnosed with Parkinson's disease 11/27/2023 and started on Sinemet 25/100mg gradually titrated to TID and then eventually 2 tablets TID, but did not notice much difference and wanted to reduce back to 1 tablet TID. He has been doing well on this dose. There was also concern for worsening memory so MoCA was completed with score 14/30 so MRI brain, TSH, B12, and neuropsychology testing were ordered and are all unremarkable (MRI with unrelated parotid lesion) except neuropsychology testing, which they are unsure if they want to complete. His memory has worsened a bit since his last visit, but he has been hospitalized since that time for urologic issues.    Parkinson's disease, stable  -Continue Sinemet 25/100mg TID  -Declined PT as he did not feel good doing it previously\"    Of interest, the bleeding of his nose did not start until about 4 days after his fall.  His wife mentions " "recent memory loss and PD symptoms. Also intermittent VH in recent months and I see that this was quite a problem last night (per Dr Jason's note from last night).   The primary problem now is that he will become more agitated every time the Precedex is tapered. Now at 0.2 is still a bit a bit restless.  When I came in he said \"get me out of here, I want to go home\".   He denies being depressed or anxious, no current VH and he does not remember them from last night.           Past Psychiatric History:   He was on Zoloft for many years for anxiety, did well on this until about a year ago, stopped because it was making him more unsteady.   Does well with a low dose of PRN Buspar.   Developed agitation with Seroquel being used several months ago.         Substance Use and History:   No drug use, no heavy alcohol, former smoker           Past Medical History:   PAST MEDICAL HISTORY:   Past Medical History:   Diagnosis Date    DIABETES MELLITUS TYPE II-UNCOMPL 8/17/2005    Essential hypertension, benign     on meds, Stress Echo 1991 - normal, but suboptimal exertion    Other and unspecified hyperlipidemia     Other isolated or specific phobias     atenolol helps, wants open MRI    Other specified causes of urethral stricture     cystoscopy 1997 some hematuria in past - benign    Squamous cell carcinoma        PAST SURGICAL HISTORY:   Past Surgical History:   Procedure Laterality Date    SURGICAL HISTORY OF -   1998    open cholecystectomy for gallstones s/p ERCP    SURGICAL HISTORY OF -   1997    cystoscopy    SURGICAL HISTORY OF -   11/1997    Urethral stricture             Family History:   FAMILY HISTORY:   Family History   Problem Relation Age of Onset    Diabetes Mother     Hypertension Mother     Hypertension Father     Heart Disease Brother         MI x2, CABG, 1st MI at 49-49 yo.     Hypertension Brother     C.A.D. Brother     Alzheimer Disease Brother     Diabetes Sister          maybe    Coronary Artery " Disease Brother     Heart Disease Brother     Melanoma No family hx of            Social History:   Has lived near [place], [place] for many years. 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          Physical ROS:   The 10 point Review of Systems is negative other than noted in the HPI or here.           Medications:     Current Facility-Administered Medications   Medication Dose Route Frequency Provider Last Rate Last Admin    [Held by provider] amoxicillin-clavulanate (AUGMENTIN) 875-125 MG per tablet 1 tablet  1 tablet Oral BID Yudi Jones PA-C        ampicillin-sulbactam (UNASYN) 3 g vial to attach to  mL bag  3 g Intravenous Q6H Yudi Jones PA-C 0 mL/hr at 04/08/25 0357 3 g at 04/08/25 0952    carbidopa-levodopa (SINEMET CR)  MG per CR tablet 1 tablet  1 tablet Oral TID Yudi Jones PA-C   1 tablet at 04/08/25 0949    carvedilol (COREG) tablet 3.125 mg  3.125 mg Oral BID w/meals Yudi Jones PA-C   3.125 mg at 04/08/25 0949    insulin aspart (NovoLOG) injection (RAPID ACTING)  1-7 Units Subcutaneous Q4H Yudi Jones PA-C   1 Units at 04/08/25 1238    mirabegron (MYRBETRIQ) 24 hr tablet 50 mg  50 mg Oral Daily Yudi Jones PA-C   50 mg at 04/08/25 0951    nystatin (MYCOSTATIN) cream   Topical BID Yudi Jones PA-C   Given at 04/08/25 0949    rosuvastatin (CRESTOR) tablet 20 mg  20 mg Oral Daily Yudi Jones PA-C   20 mg at 04/08/25 0951    [START ON 4/9/2025] sodium chloride (OCEAN) 0.65 % nasal spray 2 spray  2 spray Both Nostrils Q4H Carlene Young PA-C        sodium chloride (PF) 0.9% PF flush 3 mL  3 mL Intracatheter Q8H [facility] Yudi Jones PA-C   3 mL at 04/08/25 0955    tamsulosin (FLOMAX) capsule 0.4 mg  0.4 mg Oral Daily Yudi Jones PA-C   0.4 mg at 04/08/25 0951    white petrolatum GEL   Topical At [facility], Yudi, PA-C   Given at 04/07/25 2221              Allergies:     Allergies   Allergen Reactions    Lisinopril Cough and  Other (See Comments)    Losartan Cough and Other (See Comments)    Morphine GI Disturbance    Quetiapine Other (See Comments)     Other Reaction(s): Behavioral Disturbances      Patient got more agitated after taking seroquel at [facility].    Patient got more agitated after taking seroquel at [facility].    Terbinafine Other (See Comments)     hematuria          Labs:     Recent Results (from the past 48 hours)   Hemoglobin    Collection Time: 04/06/25  6:25 PM   Result Value Ref Range    Hemoglobin 11.8 (L) 13.3 - 17.7 g/dL   Glucose by meter    Collection Time: 04/06/25  7:14 PM   Result Value Ref Range    GLUCOSE BY METER POCT 159 (H) 70 - 99 mg/dL   Glucose by meter    Collection Time: 04/06/25  9:58 PM   Result Value Ref Range    GLUCOSE BY METER POCT 144 (H) 70 - 99 mg/dL   Glucose by meter    Collection Time: 04/06/25 10:30 PM   Result Value Ref Range    GLUCOSE BY METER POCT 145 (H) 70 - 99 mg/dL   Glucose by meter    Collection Time: 04/07/25  2:17 AM   Result Value Ref Range    GLUCOSE BY METER POCT 131 (H) 70 - 99 mg/dL   Glucose by meter    Collection Time: 04/07/25  6:34 AM   Result Value Ref Range    GLUCOSE BY METER POCT 139 (H) 70 - 99 mg/dL   Comprehensive metabolic panel    Collection Time: 04/07/25  7:36 AM   Result Value Ref Range    Sodium 145 135 - 145 mmol/L    Potassium 3.5 3.4 - 5.3 mmol/L    Carbon Dioxide (CO2) 23 22 - 29 mmol/L    Anion Gap 14 7 - 15 mmol/L    Urea Nitrogen 20.8 8.0 - 23.0 mg/dL    Creatinine 0.85 0.67 - 1.17 mg/dL    GFR Estimate 90 >60 mL/min/1.73m2    Calcium 9.0 8.8 - 10.4 mg/dL    Chloride 108 (H) 98 - 107 mmol/L    Glucose 150 (H) 70 - 99 mg/dL    Alkaline Phosphatase 67 40 - 150 U/L    AST 27 0 - 45 U/L    ALT 7 0 - 70 U/L    Protein Total 6.1 (L) 6.4 - 8.3 g/dL    Albumin 3.7 3.5 - 5.2 g/dL    Bilirubin Total 0.9 <=1.2 mg/dL   CBC with platelets    Collection Time: 04/07/25  7:36 AM   Result Value Ref Range    WBC Count 10.3 4.0 - 11.0 10e3/uL    RBC  Count 3.71 (L) 4.40 - 5.90 10e6/uL    Hemoglobin 11.5 (L) 13.3 - 17.7 g/dL    Hematocrit 33.8 (L) 40.0 - 53.0 %    MCV 91 78 - 100 fL    MCH 31.0 26.5 - 33.0 pg    MCHC 34.0 31.5 - 36.5 g/dL    RDW 13.3 10.0 - 15.0 %    Platelet Count 144 (L) 150 - 450 10e3/uL   Glucose by meter    Collection Time: 04/07/25 11:36 AM   Result Value Ref Range    GLUCOSE BY METER POCT 159 (H) 70 - 99 mg/dL   Glucose by meter    Collection Time: 04/07/25  3:30 PM   Result Value Ref Range    GLUCOSE BY METER POCT 157 (H) 70 - 99 mg/dL   Glucose by meter    Collection Time: 04/07/25  7:20 PM   Result Value Ref Range    GLUCOSE BY METER POCT 117 (H) 70 - 99 mg/dL   Glucose by meter    Collection Time: 04/07/25 10:24 PM   Result Value Ref Range    GLUCOSE BY METER POCT 146 (H) 70 - 99 mg/dL   Glucose by meter    Collection Time: 04/08/25  2:41 AM   Result Value Ref Range    GLUCOSE BY METER POCT 121 (H) 70 - 99 mg/dL   Glucose by meter    Collection Time: 04/08/25  6:21 AM   Result Value Ref Range    GLUCOSE BY METER POCT 127 (H) 70 - 99 mg/dL   Comprehensive metabolic panel    Collection Time: 04/08/25  6:37 AM   Result Value Ref Range    Sodium 143 135 - 145 mmol/L    Potassium 3.6 3.4 - 5.3 mmol/L    Carbon Dioxide (CO2) 24 22 - 29 mmol/L    Anion Gap 12 7 - 15 mmol/L    Urea Nitrogen 18.0 8.0 - 23.0 mg/dL    Creatinine 0.86 0.67 - 1.17 mg/dL    GFR Estimate 90 >60 mL/min/1.73m2    Calcium 9.1 8.8 - 10.4 mg/dL    Chloride 107 98 - 107 mmol/L    Glucose 113 (H) 70 - 99 mg/dL    Alkaline Phosphatase 73 40 - 150 U/L    AST 56 (H) 0 - 45 U/L    ALT 27 0 - 70 U/L    Protein Total 6.3 (L) 6.4 - 8.3 g/dL    Albumin 3.8 3.5 - 5.2 g/dL    Bilirubin Total 0.9 <=1.2 mg/dL   CBC with platelets    Collection Time: 04/08/25  6:37 AM   Result Value Ref Range    WBC Count 9.2 4.0 - 11.0 10e3/uL    RBC Count 3.74 (L) 4.40 - 5.90 10e6/uL    Hemoglobin 11.7 (L) 13.3 - 17.7 g/dL    Hematocrit 34.9 (L) 40.0 - 53.0 %    MCV 93 78 - 100 fL    MCH 31.3 26.5 -  "33.0 pg    MCHC 33.5 31.5 - 36.5 g/dL    RDW 13.3 10.0 - 15.0 %    Platelet Count 166 150 - 450 10e3/uL   Glucose by meter    Collection Time: 04/08/25  8:02 AM   Result Value Ref Range    GLUCOSE BY METER POCT 126 (H) 70 - 99 mg/dL   Glucose by meter    Collection Time: 04/08/25 12:31 PM   Result Value Ref Range    GLUCOSE BY METER POCT 144 (H) 70 - 99 mg/dL          Physical and Psychiatric Examination:     BP (!) 140/64   Pulse 77   Temp 97.8  F (36.6  C)   Resp 18   SpO2 94%   Weight is 0 lbs 0 oz  There is no height or weight on file to calculate BMI.    Physical Exam:  I have reviewed the physical exam as documented by by the medical team and agree with findings and assessment and have no additional findings to add at this time.         MSE:   Appearance: awake, alert and adequately groomed  Attitude:  somewhat cooperative  Eye Contact:  fair  Mood:  \"fair\"  Affect:  slightly restricted  Speech:  dysarthria  Psychomotor Behavior:  no evidence of tardive dyskinesia, dystonia, or tics  Muscle strength and tone: intact   Thought Process:  tangental  Associations:  loosening of associations present  Thought Content:  visual hallucinations present  Insight:  limited  Judgement:  limited  Oriented to:  person, year   Attention Span and Concentration:  limited  Recent and Remote Memory:  limited              DSM-5 Diagnosis:   300.02 (F41.1) Generalized Anxiety Disorder  unspecified neurocognitive disorder (likely DLB)             Assessment:   His presentation is consistent with dementia of Lewy body (DLB) which is very sensitive to antipsychotics and they don't work very well. Also the VH could be due to Sinemet.   He seems to respond well to low dose Buspar           Summary of Recommendations:   I would try 5 mg of Buspar BID for anxiety   Consider reducing Sinemet to BID from TID for a short time (re; VH)  Can cautiously try Risperdal 0.25 to 0.5 mg (max) BID to see if VH are improved, if it calms him.  He " "should make a follow up appointment with his neurologist soon    Contact me as needed.  Nathanael Barry M.D.   Consult Liaison Psychiatry   [facility]   Contact on Vocera  If I am not available, then [facility] line ([phone]) should know who   Is covering our consult service.           \"This dictation was performed with voice recognition software and may contain errors,  omissions and inadvertent word substitution.\"           "

## 2025-04-08 NOTE — CONSULTS
Please refer to Otolaryngology progress note by Dr. Alvarado on this date. This note is to fulfill consult order only.

## 2025-04-08 NOTE — PROGRESS NOTES
[facility]    Medicine Progress Note - Hospitalist Service, GOLD TEAM 8    Date of Admission:  4/5/2025    Assessment & Plan   Fco Arroyo is a 75 year old male admitted on 4/5/2025. He has PMH of HTN, T2DM, anxiety, and dementia who had recent fall on 3/31 resulting in nasal bone fractures. He has since had issues with epistaxis for which he has been to [facility] ED multiple times. He was most recently sent home with BL Rhinorockets in place though continued to have bleeding through these prompting ED visit today. However, during evaluations, patient became increasingly distressed and agitated requiring Code 21 call. Patient will be admitted for further management given risk of severe bleed if he was to pull the rhinorockets at home.    Changes today  - removed nasal rhinorockets and epistaxis stable  - starting Buspar and Risperdal and try to wean precedex PRN     Epistaxis  Nasal bone fractures 2/2 GLF 3/31  Patient tripped and fell while walking into [facility] on 3/31, was seen at ED that night and found to have acute fractures of the nasal bones and bony nasal septum. He was able to be discharged home, though he represented to [facility] ED on 4/4 due to epistaxis. Repeat imaging was stable. It was noted on exam, bony septum was visible and concerning for open and displaced fracture. ENT recommended Augmentin at that time which was started and he was discharged home. He presented again early this morning to [facility] ED for ongoing bleeding, and decision was made to place bilateral 5.5cm Rhino Rocket. Unfortunately, he continued to bleed around the packing and had blood coming out of his eyes. Decision was made to transfer to [facility] for possible IR embolization. Upon presentation to [facility] ED, he had slight dripping of blood. He was seen by ENT however, no exam/intervention could be performed as patient became acutely agitated, requiring 0.5mg Ativan IV, Haldol 5mg IV, Code 21,  restraints, and eventually Precedex drip for patient's safety. There is concern that if patient were to go home at this time, he may pull out packing and have large volume bleed. Family agrees with admission for further definitive management.   -- Continue Precedex gtt prn to prevent patient from removing packing; mitts in place; planning for reevaluation by ENT in AM    ENT recs below:  - Posisep in place in the right nasal passage - this is dissolvable and does not need to be removed.   - recommend leaving left nasal balloon in place for now. If no further bleeding, consider balloon deflation/removal tomorrow.   - Precedex/sedation management per primary team. Patient will need close monitoring to ensure he does not pull out the nasal packing himself given confusion  - Minimize pressure on nasal ala - balloon currently compressible  - Antibiotics while non absorbable nasal packing is in place for toxic shock syndrome prophylaxis - currently on IV Unasyn  - Nasal saline q 2 hours while awake around the nasal packing to prevent denuding of nasal mucosa while non absorbable packing is in place  - Vaseline or water based lubricant to anterior nares at night  -If patient develops recurrent bright red bleeding around rapid rhino, consider IV TXA if patient will continues to be combative and intolerant of bedside exam  -Otherwise consult IR for definitive embolization if concerns for large volumes of epistaxis  - If patient experiences brisk bleeding, spray Afrin directly onto the rhinorockets and hold digital pressure on bilateral nostrils for 20 minutes. If bleeding continues after 20 minutes of digital pressure, repeat the above steps. If this fails to control the bleeding at that time, call ENT for further recommendations.     -- Continuous Pulse ox  -- Sitter present  -- Repeat hgb in AM; low threshold for repeat Hgb if rebleeding occurs     Left eyebrow laceration s/p repair  Secondary to recent fall. Was sutured  "in the ED on 3/31.  -- Sutures appear ready to removed soon, deferred at this time given agitation     HTN  -- Continue PTA coreg 3.125 BID  -- HOLD PTA ASA given acute bleeding and likely need for nasal packings for several days  -- HOLD PTA Lasix in setting of acute bleeding and decreased PO intake  -- Continue PTA Statin     T2DM  -- HOLD PTA Metformin and Glipizide  -- MSSI  -- ACHS gluc checks     Acute Agitation  Anxiety  Dementia/Parkinson's disease  Family reports that patient is baseline A&Ox4, usually not aggressive.   -- Continue Precedex gtt at this time  -- Continue PTA Sinemet 25/100 TID     BPH  -- Continue PTA Flomax  -- Continue PTA Myrbetrig    To note, lives at home with wife.  Wife not at bedside during rounds today (or yesterday).          Diet: Advance Diet as Tolerated: Regular Diet Adult; Regular Diet Adult    DVT Prophylaxis: Pneumatic Compression Devices  Jung Catheter: Not present  Lines: None     Cardiac Monitoring: None  Code Status: Full Code      Clinically Significant Risk Factors          # Hyperchloremia: Highest Cl = 108 mmol/L in last 2 days, will monitor as appropriate              # Hypertension: Noted on problem list            # Obesity: Estimated body mass index is 35.51 kg/m  as calculated from the following:    Height as of an earlier encounter on 4/5/25: 1.676 m (5' 6\").    Weight as of an earlier encounter on 4/5/25: 99.8 kg (220 lb)., PRESENT ON ADMISSION            Social Drivers of Health    Tobacco Use: Medium Risk (3/24/2025)    [facility]    Patient History     Smoking Tobacco Use: Former     Smokeless Tobacco Use: Never          Disposition Plan     Medically Ready for Discharge: likely tomorrow    Niles Mark DO, MPH  Internal Medicine-Pediatrics Hospitalist  Hospitalist Service, GOLD TEAM 8  [facility]  Securely message with [employer] (more info)  Text page via [employer] " Paging/Directory   See signed in provider for up to date coverage information  ______________________________________________________________________    Interval History     No new complaints. Appears calm and wife at bedside      Physical Exam   Vital Signs: Temp: 97.8  F (36.6  C) Temp src: Oral BP: (!) 145/78 Pulse: 82   Resp: 18 SpO2: 99 % O2 Device: None (Room air)    Weight: 0 lbs 0 oz    Constitutional:       General: He is not in acute distress.     Appearance: He is not diaphoretic.   HENT:      Head:      Comments: Significant facial bruising, more so over left upper face and nose. No active bleeding  Cardiovascular:      Rate and Rhythm: Normal rate and regular rhythm.      Heart sounds: No murmur heard.  Pulmonary:      Effort: Pulmonary effort is normal.      Breath sounds: No wheezing, rhonchi or rales.   Skin:     General: Skin is warm and dry.   Neurological:      Mental Status: He is alert. No agitated.     Comments: Able to answer my questions this morning.      Medical Decision Making         Data     I have personally reviewed the following data over the past 24 hrs:    9.2  \   11.7 (L)   / 166     143 107 18.0 /  124 (H)   3.6 24 0.86 \     ALT: 27 AST: 56 (H) AP: 73 TBILI: 0.9   ALB: 3.8 TOT PROTEIN: 6.3 (L) LIPASE: N/A       Imaging results reviewed over the past 24 hrs:   No results found for this or any previous visit (from the past 24 hours).

## 2025-04-08 NOTE — PLAN OF CARE
Neuro: A&Ox1, intermittent hallucination, occasional agitation-see flowsheet for neuro assessments.   Cardiac: SR. VSS.   Respiratory: Sating >92% on RA.  GI/: Adequate urine output. BM X1  Diet/appetite: Tolerating Regular diet. Appetite poor.   Activity:  Assist of 2 with gaitbelt, up to chair X1.   Pain: At acceptable level on current regimen.   Skin: No new deficits noted.  LDA's:L PIV-Precedex 2mcg/kg/hr. R PIV-SL.     Plan: Psych consult completed. Busprione and Risperdal to start this evening (see psych note), Per Alberto CASTILLO do not wean precedex until AM if pt stable. ENT saw pt (see note). No nose bleeds this shift. Encouraged PO intake. Updated care plan.     Goal Outcome Evaluation:           Overall Patient Progress: no changeOverall Patient Progress: no change    Outcome Evaluation: more calm today, agitated occasionally.

## 2025-04-09 LAB
ALBUMIN SERPL BCG-MCNC: 3.7 G/DL (ref 3.5–5.2)
ALP SERPL-CCNC: 79 U/L (ref 40–150)
ALT SERPL W P-5'-P-CCNC: 14 U/L (ref 0–70)
ANION GAP SERPL CALCULATED.3IONS-SCNC: 15 MMOL/L (ref 7–15)
AST SERPL W P-5'-P-CCNC: 55 U/L (ref 0–45)
BILIRUB SERPL-MCNC: 0.9 MG/DL
BUN SERPL-MCNC: 17.3 MG/DL (ref 8–23)
CALCIUM SERPL-MCNC: 8.9 MG/DL (ref 8.8–10.4)
CHLORIDE SERPL-SCNC: 106 MMOL/L (ref 98–107)
CREAT SERPL-MCNC: 0.85 MG/DL (ref 0.67–1.17)
EGFRCR SERPLBLD CKD-EPI 2021: 90 ML/MIN/1.73M2
ERYTHROCYTE [DISTWIDTH] IN BLOOD BY AUTOMATED COUNT: 13.2 % (ref 10–15)
GLUCOSE BLDC GLUCOMTR-MCNC: 109 MG/DL (ref 70–99)
GLUCOSE BLDC GLUCOMTR-MCNC: 111 MG/DL (ref 70–99)
GLUCOSE BLDC GLUCOMTR-MCNC: 129 MG/DL (ref 70–99)
GLUCOSE BLDC GLUCOMTR-MCNC: 145 MG/DL (ref 70–99)
GLUCOSE BLDC GLUCOMTR-MCNC: 87 MG/DL (ref 70–99)
GLUCOSE BLDC GLUCOMTR-MCNC: 96 MG/DL (ref 70–99)
GLUCOSE BLDC GLUCOMTR-MCNC: 97 MG/DL (ref 70–99)
GLUCOSE SERPL-MCNC: 115 MG/DL (ref 70–99)
HCO3 SERPL-SCNC: 22 MMOL/L (ref 22–29)
HCT VFR BLD AUTO: 34.8 % (ref 40–53)
HGB BLD-MCNC: 11.5 G/DL (ref 13.3–17.7)
MCH RBC QN AUTO: 31.1 PG (ref 26.5–33)
MCHC RBC AUTO-ENTMCNC: 33 G/DL (ref 31.5–36.5)
MCV RBC AUTO: 94 FL (ref 78–100)
PLATELET # BLD AUTO: 169 10E3/UL (ref 150–450)
POTASSIUM SERPL-SCNC: 3.7 MMOL/L (ref 3.4–5.3)
PROT SERPL-MCNC: 6.2 G/DL (ref 6.4–8.3)
RBC # BLD AUTO: 3.7 10E6/UL (ref 4.4–5.9)
SODIUM SERPL-SCNC: 143 MMOL/L (ref 135–145)
WBC # BLD AUTO: 8.9 10E3/UL (ref 4–11)

## 2025-04-09 PROCEDURE — 84460 ALANINE AMINO (ALT) (SGPT): CPT

## 2025-04-09 PROCEDURE — 250N000013 HC RX MED GY IP 250 OP 250 PS 637

## 2025-04-09 PROCEDURE — 85014 HEMATOCRIT: CPT

## 2025-04-09 PROCEDURE — 85041 AUTOMATED RBC COUNT: CPT

## 2025-04-09 PROCEDURE — 250N000013 HC RX MED GY IP 250 OP 250 PS 637: Performed by: HOSPITALIST

## 2025-04-09 PROCEDURE — 250N000009 HC RX 250: Performed by: FAMILY MEDICINE

## 2025-04-09 PROCEDURE — 85048 AUTOMATED LEUKOCYTE COUNT: CPT

## 2025-04-09 PROCEDURE — 82040 ASSAY OF SERUM ALBUMIN: CPT

## 2025-04-09 PROCEDURE — 250N000011 HC RX IP 250 OP 636

## 2025-04-09 PROCEDURE — 36415 COLL VENOUS BLD VENIPUNCTURE: CPT

## 2025-04-09 PROCEDURE — 120N000003 HC R&B IMCU UMMC

## 2025-04-09 PROCEDURE — 99233 SBSQ HOSP IP/OBS HIGH 50: CPT | Performed by: HOSPITALIST

## 2025-04-09 PROCEDURE — 999N000128 HC STATISTIC PERIPHERAL IV START W/O US GUIDANCE

## 2025-04-09 RX ADMIN — ACETAMINOPHEN 650 MG: 325 TABLET, FILM COATED ORAL at 14:27

## 2025-04-09 RX ADMIN — NYSTATIN: 100000 CREAM TOPICAL at 20:13

## 2025-04-09 RX ADMIN — BUSPIRONE HYDROCHLORIDE 5 MG: 5 TABLET ORAL at 23:29

## 2025-04-09 RX ADMIN — RISPERIDONE 0.25 MG: 0.25 TABLET, FILM COATED ORAL at 14:58

## 2025-04-09 RX ADMIN — BUSPIRONE HYDROCHLORIDE 5 MG: 5 TABLET ORAL at 14:58

## 2025-04-09 RX ADMIN — AMPICILLIN SODIUM AND SULBACTAM SODIUM 3 G: 2; 1 INJECTION, POWDER, FOR SOLUTION INTRAMUSCULAR; INTRAVENOUS at 04:09

## 2025-04-09 RX ADMIN — CARVEDILOL 3.12 MG: 3.12 TABLET, FILM COATED ORAL at 17:45

## 2025-04-09 RX ADMIN — DEXMEDETOMIDINE HYDROCHLORIDE 0.7 MCG/KG/HR: 4 INJECTION, SOLUTION INTRAVENOUS at 05:11

## 2025-04-09 RX ADMIN — RISPERIDONE 0.25 MG: 0.25 TABLET, FILM COATED ORAL at 23:29

## 2025-04-09 RX ADMIN — NYSTATIN: 100000 CREAM TOPICAL at 08:48

## 2025-04-09 RX ADMIN — Medication: at 23:41

## 2025-04-09 RX ADMIN — CARBIDOPA AND LEVODOPA 1 TABLET: 25; 100 TABLET, EXTENDED RELEASE ORAL at 23:29

## 2025-04-09 RX ADMIN — CARBIDOPA AND LEVODOPA 1 TABLET: 25; 100 TABLET, EXTENDED RELEASE ORAL at 14:28

## 2025-04-09 ASSESSMENT — ACTIVITIES OF DAILY LIVING (ADL)
ADLS_ACUITY_SCORE: 70
ADLS_ACUITY_SCORE: 70
ADLS_ACUITY_SCORE: 75
ADLS_ACUITY_SCORE: 70
ADLS_ACUITY_SCORE: 67
ADLS_ACUITY_SCORE: 78
ADLS_ACUITY_SCORE: 78
ADLS_ACUITY_SCORE: 74
ADLS_ACUITY_SCORE: 75
ADLS_ACUITY_SCORE: 70
ADLS_ACUITY_SCORE: 72
ADLS_ACUITY_SCORE: 70
ADLS_ACUITY_SCORE: 67
ADLS_ACUITY_SCORE: 70
ADLS_ACUITY_SCORE: 70
ADLS_ACUITY_SCORE: 74
ADLS_ACUITY_SCORE: 70
ADLS_ACUITY_SCORE: 72
ADLS_ACUITY_SCORE: 74

## 2025-04-09 NOTE — PROGRESS NOTES
Transfer  Transferred from: ED  Via:bed  Reason for transfer: Pt appropriate for 6B- improved  Family: Aware of transfer  Belongings: Received with pt  Chart: Received with pt  Medications: Meds received from old unit with pt  Code Status verified on armband: yes  2 RN Skin Assessment Completed By: Marc ORTIZ, Randi ORTIZ  Med rec completed: no  Suction/Ambu bag/Flowmeter at bedside: yes    Report received from:  clementine ORTIZ  Pt status: intermittent confusion and hallucination. With precedex drip running at 0.2mcg/kg/min  VSS

## 2025-04-09 NOTE — PROVIDER NOTIFICATION
0856: Notified Dr. Mike Dominguez pt refused all oral medications, Precedex dose was weaned to 0.4 as pt was difficult to arouse. Then attempted to give meds with applesauce, pt spit applesauce out at nurses. Pt became agitated and was only oriented to self.     Provider came to bedside to assess pt and requested to continue weaning Precedex. Aware we were unable to give morning meds orally.      1150: Notified Dr. Mike Dominguez pt was still hard to arouse after Precedex weaned to 0.2, ad continuing to refuse all oral meds, food, or water.    Provider requested to stop Precedex and monitor.      1436: Provider notified pt was now rousable to voice and may take medications with wife's encouragement. Also requested provider to renew order for restraints given pt's confusion and intermittent agitation.     Provider ordered AM Buspar and Risperdal doses to be given and renewed order for restraints

## 2025-04-09 NOTE — PLAN OF CARE
Neuro: A&Ox1, agitated, lethargic, refusing meds and food at start of shift, A&Ox,3 calm and cooperative at end of shift.   Cardiac: SR. VSS.   Respiratory: Sating >95% on RA.  GI/: 300 mL of urine output during shift, pt also not drinking liquids during this time. Wearing primofit. Very small dark BM.  Diet/appetite: Tolerating regular diet. Refused to eat or drink until 1600, but then ate dinner. Took afternoon meds.  Activity:  Assist of 2 for repositioning. Not oob during shift. Requested OT/PT consult order  Pain: At acceptable level on current regimen. Shoulder pain 5/10 managed with tylenol.  Skin: Rash under right upper arm noted, no related pain or itching  LDA's: L PIV saline locked, R PIV saline locked.    Plan: Continue to monitor for confusion. Follow up with provider for suture removal prior to discharge. Continue with POC. Notify primary team with changes.       Goal Outcome Evaluation:      Plan of Care Reviewed With: patient, spouse    Overall Patient Progress: improving    Outcome Evaluation: Pt was confused, agitated, lethargic, and oriented x 1 at beginning of shift. Refused oral meds/food. After tirating precedex off and giving risperidone and buspirone pt became more alert and oriented x3. Restraints were discontinued, pt took meds and ate dinner. VSS.

## 2025-04-09 NOTE — PROGRESS NOTES
[facility]    Medicine Progress Note - Hospitalist Service, GOLD TEAM 8    Date of Admission:  4/5/2025    Assessment & Plan   Fco Arroyo is a 75 year old male admitted on 4/5/2025. He has PMH of HTN, T2DM, anxiety, and dementia who had recent fall on 3/31 resulting in nasal bone fractures. He has since had issues with epistaxis for which he has been to [facility] ED multiple times. He was most recently sent home with BL Rhinorockets in place though continued to have bleeding through these prompting ED visit today. However, during evaluations, patient became increasingly distressed and agitated requiring Code 21 call. Patient will be admitted for further management given risk of severe bleed if he was to pull the rhinorockets at home.    Changes today  - mental obtundation and stopping precedex.      Epistaxis  Nasal bone fractures 2/2 GLF 3/31  Patient tripped and fell while walking into [facility] on 3/31, was seen at ED that night and found to have acute fractures of the nasal bones and bony nasal septum. He was able to be discharged home, though he represented to [facility] ED on 4/4 due to epistaxis. Repeat imaging was stable. It was noted on exam, bony septum was visible and concerning for open and displaced fracture. ENT recommended Augmentin at that time which was started and he was discharged home. He presented again early this morning to [facility] ED for ongoing bleeding, and decision was made to place bilateral 5.5cm Rhino Rocket. Unfortunately, he continued to bleed around the packing and had blood coming out of his eyes. Decision was made to transfer to [facility] for possible IR embolization. Upon presentation to [facility] ED, he had slight dripping of blood. He was seen by ENT however, no exam/intervention could be performed as patient became acutely agitated, requiring 0.5mg Ativan IV, Haldol 5mg IV, Code 21, restraints, and eventually Precedex drip for patient's safety. There is  concern that if patient were to go home at this time, he may pull out packing and have large volume bleed. Family agrees with admission for further definitive management.   -started on precedex for ENT eval and rest as below  ENT recs below:  - Posisep in place in the right nasal passage - this is dissolvable and does not need to be removed.   - Surgiflo in place in the left nasal passage- this is dissolvable and does not need to be removed.  - Ok to discontinue antibiotics as non absorbable nasal packing has been taken out.  - Start nasal saline spray to b/l nasal passages Q4H starting tomorrow. This will keep nasal mucosa moist and help dissolve the packing  - avoid nose blowing, sneeze and cough with mouth open  - Vaseline or water based lubricant to anterior nares at night  - If patient experiences brisk rebleeding, spray Afrin and hold digital pressure on bilateral nostrils for 20 minutes. If bleeding continues after 20 minutes of digital pressure, repeat the above steps. If this fails to control the bleeding at that time, call ENT for further recommendations.   -- Continuous Pulse ox  -- Sitter present  -- Repeat hgb in AM; low threshold for repeat Hgb if rebleeding occurs    Acute Agitation  Anxiety  Dementia/Parkinson's disease  Family reports that patient is baseline A&Ox4, usually not aggressive.   -- started on Precedex gtt. Adding Buspar and risperdal and stopped precedex 4/9/25  -- Continue PTA Sinemet 25/100 TID     Left eyebrow laceration s/p repair  Secondary to recent fall. Was sutured in the ED on 3/31.  -- Sutures appear ready to removed soon, deferred at this time given agitation     HTN  -- Continue PTA coreg 3.125 BID  -- HOLD PTA ASA given acute bleeding and likely need for nasal packings for several days  -- HOLD PTA Lasix in setting of acute bleeding and decreased PO intake  -- Continue PTA Statin     T2DM  -- HOLD PTA Metformin and Glipizide  -- MSSI  -- ACHS gluc checks          BPH  --  "Continue PTA Flomax  -- Continue PTA Myrbetrig    To note, lives at home with wife.  Wife not at bedside during rounds today (or yesterday).          Diet: Advance Diet as Tolerated: Regular Diet Adult; Regular Diet Adult    DVT Prophylaxis: Pneumatic Compression Devices  Jung Catheter: Not present  Lines: None     Cardiac Monitoring: None  Code Status: Full Code      Clinically Significant Risk Factors                   # Hypertension: Noted on problem list            # Obesity: Estimated body mass index is 32.31 kg/m  as calculated from the following:    Height as of an earlier encounter on 4/5/25: 1.676 m (5' 6\").    Weight as of this encounter: 90.8 kg (200 lb 2.8 oz)., PRESENT ON ADMISSION            Social Drivers of Health    Tobacco Use: Medium Risk (3/24/2025)    [facility] [employer] [facility]    Patient History     Smoking Tobacco Use: Former     Smokeless Tobacco Use: Never          Disposition Plan     Medically Ready for Discharge: likely tomorrow    Mike Dominguez MD      Hospitalist Service, GOLD TEAM 8  [facility]  Securely message with [employer] (more info)  Text page via [email or web address] Paging/Directory   See signed in provider for up to date coverage information  ______________________________________________________________________    Interval History    Sedated and minimally interactive      Physical Exam   Vital Signs: Temp: 98.5  F (36.9  C) Temp src: Axillary BP: (!) 157/73 Pulse: 70   Resp: 18 SpO2: 95 % O2 Device: None (Room air)    Weight: 200 lbs 2.84 oz    Constitutional:       General: He is not in acute distress.     Appearance: He is not diaphoretic.   HENT:      Head:      Comments: Significant facial bruising, more so over left upper face and nose. No active bleeding  Cardiovascular:      Rate and Rhythm: Normal rate and regular rhythm.      Heart sounds: No murmur heard.  Pulmonary:      Effort: Pulmonary effort is " normal.      Breath sounds: No wheezing, rhonchi or rales.   Skin:     General: Skin is warm and dry.   Neurological:      Mental Status: lethargic      Medical Decision Making         Data     I have personally reviewed the following data over the past 24 hrs:    8.9  \   11.5 (L)   / 169     143 106 17.3 /  115 (H)   3.7 22 0.85 \     ALT: 14 AST: 55 (H) AP: 79 TBILI: 0.9   ALB: 3.7 TOT PROTEIN: 6.2 (L) LIPASE: N/A       Imaging results reviewed over the past 24 hrs:   No results found for this or any previous visit (from the past 24 hours).

## 2025-04-10 ENCOUNTER — APPOINTMENT (OUTPATIENT)
Dept: OCCUPATIONAL THERAPY | Facility: CLINIC | Age: 75
DRG: 155 | End: 2025-04-10
Payer: COMMERCIAL

## 2025-04-10 ENCOUNTER — APPOINTMENT (OUTPATIENT)
Dept: PHYSICAL THERAPY | Facility: CLINIC | Age: 75
DRG: 155 | End: 2025-04-10
Payer: COMMERCIAL

## 2025-04-10 LAB
ALBUMIN SERPL BCG-MCNC: 3.5 G/DL (ref 3.5–5.2)
ALP SERPL-CCNC: 76 U/L (ref 40–150)
ALT SERPL W P-5'-P-CCNC: 12 U/L (ref 0–70)
ANION GAP SERPL CALCULATED.3IONS-SCNC: 10 MMOL/L (ref 7–15)
AST SERPL W P-5'-P-CCNC: 40 U/L (ref 0–45)
BILIRUB SERPL-MCNC: 0.7 MG/DL
BUN SERPL-MCNC: 17.9 MG/DL (ref 8–23)
CALCIUM SERPL-MCNC: 8.8 MG/DL (ref 8.8–10.4)
CHLORIDE SERPL-SCNC: 106 MMOL/L (ref 98–107)
CREAT SERPL-MCNC: 0.89 MG/DL (ref 0.67–1.17)
EGFRCR SERPLBLD CKD-EPI 2021: 89 ML/MIN/1.73M2
ERYTHROCYTE [DISTWIDTH] IN BLOOD BY AUTOMATED COUNT: 13.5 % (ref 10–15)
GLUCOSE BLDC GLUCOMTR-MCNC: 110 MG/DL (ref 70–99)
GLUCOSE BLDC GLUCOMTR-MCNC: 112 MG/DL (ref 70–99)
GLUCOSE BLDC GLUCOMTR-MCNC: 145 MG/DL (ref 70–99)
GLUCOSE BLDC GLUCOMTR-MCNC: 173 MG/DL (ref 70–99)
GLUCOSE SERPL-MCNC: 92 MG/DL (ref 70–99)
HCO3 SERPL-SCNC: 24 MMOL/L (ref 22–29)
HCT VFR BLD AUTO: 32.9 % (ref 40–53)
HGB BLD-MCNC: 11.2 G/DL (ref 13.3–17.7)
MCH RBC QN AUTO: 30.4 PG (ref 26.5–33)
MCHC RBC AUTO-ENTMCNC: 34 G/DL (ref 31.5–36.5)
MCV RBC AUTO: 89 FL (ref 78–100)
PLATELET # BLD AUTO: 191 10E3/UL (ref 150–450)
POTASSIUM SERPL-SCNC: 3.6 MMOL/L (ref 3.4–5.3)
PROT SERPL-MCNC: 5.9 G/DL (ref 6.4–8.3)
RBC # BLD AUTO: 3.68 10E6/UL (ref 4.4–5.9)
SODIUM SERPL-SCNC: 140 MMOL/L (ref 135–145)
WBC # BLD AUTO: 9.3 10E3/UL (ref 4–11)

## 2025-04-10 PROCEDURE — 97530 THERAPEUTIC ACTIVITIES: CPT | Mod: GP

## 2025-04-10 PROCEDURE — 82565 ASSAY OF CREATININE: CPT

## 2025-04-10 PROCEDURE — 84155 ASSAY OF PROTEIN SERUM: CPT

## 2025-04-10 PROCEDURE — 120N000003 HC R&B IMCU UMMC

## 2025-04-10 PROCEDURE — 250N000013 HC RX MED GY IP 250 OP 250 PS 637

## 2025-04-10 PROCEDURE — 99232 SBSQ HOSP IP/OBS MODERATE 35: CPT | Performed by: HOSPITALIST

## 2025-04-10 PROCEDURE — 97535 SELF CARE MNGMENT TRAINING: CPT | Mod: GO

## 2025-04-10 PROCEDURE — 97165 OT EVAL LOW COMPLEX 30 MIN: CPT | Mod: GO

## 2025-04-10 PROCEDURE — 250N000013 HC RX MED GY IP 250 OP 250 PS 637: Performed by: HOSPITALIST

## 2025-04-10 PROCEDURE — 84450 TRANSFERASE (AST) (SGOT): CPT

## 2025-04-10 PROCEDURE — 85049 AUTOMATED PLATELET COUNT: CPT

## 2025-04-10 PROCEDURE — 97161 PT EVAL LOW COMPLEX 20 MIN: CPT | Mod: GP

## 2025-04-10 PROCEDURE — 36415 COLL VENOUS BLD VENIPUNCTURE: CPT

## 2025-04-10 PROCEDURE — 97530 THERAPEUTIC ACTIVITIES: CPT | Mod: GO

## 2025-04-10 RX ADMIN — BUSPIRONE HYDROCHLORIDE 5 MG: 5 TABLET ORAL at 08:26

## 2025-04-10 RX ADMIN — CARVEDILOL 3.12 MG: 3.12 TABLET, FILM COATED ORAL at 17:02

## 2025-04-10 RX ADMIN — ACETAMINOPHEN 650 MG: 325 TABLET, FILM COATED ORAL at 08:42

## 2025-04-10 RX ADMIN — RISPERIDONE 0.25 MG: 0.25 TABLET, FILM COATED ORAL at 20:11

## 2025-04-10 RX ADMIN — ACETAMINOPHEN 650 MG: 325 TABLET, FILM COATED ORAL at 04:57

## 2025-04-10 RX ADMIN — Medication: at 22:14

## 2025-04-10 RX ADMIN — NYSTATIN: 100000 CREAM TOPICAL at 08:14

## 2025-04-10 RX ADMIN — CARBIDOPA AND LEVODOPA 1 TABLET: 25; 100 TABLET, EXTENDED RELEASE ORAL at 14:30

## 2025-04-10 RX ADMIN — TAMSULOSIN HYDROCHLORIDE 0.4 MG: 0.4 CAPSULE ORAL at 08:26

## 2025-04-10 RX ADMIN — RISPERIDONE 0.25 MG: 0.25 TABLET, FILM COATED ORAL at 08:26

## 2025-04-10 RX ADMIN — BUSPIRONE HYDROCHLORIDE 5 MG: 5 TABLET ORAL at 20:11

## 2025-04-10 RX ADMIN — MIRABEGRON 50 MG: 50 TABLET, EXTENDED RELEASE ORAL at 08:26

## 2025-04-10 RX ADMIN — ROSUVASTATIN CALCIUM 20 MG: 20 TABLET, FILM COATED ORAL at 08:26

## 2025-04-10 RX ADMIN — CARVEDILOL 3.12 MG: 3.12 TABLET, FILM COATED ORAL at 08:27

## 2025-04-10 RX ADMIN — CARBIDOPA AND LEVODOPA 1 TABLET: 25; 100 TABLET, EXTENDED RELEASE ORAL at 08:26

## 2025-04-10 RX ADMIN — ACETAMINOPHEN 650 MG: 325 TABLET, FILM COATED ORAL at 20:11

## 2025-04-10 RX ADMIN — CARBIDOPA AND LEVODOPA 1 TABLET: 25; 100 TABLET, EXTENDED RELEASE ORAL at 20:11

## 2025-04-10 ASSESSMENT — ACTIVITIES OF DAILY LIVING (ADL)
ADLS_ACUITY_SCORE: 73
ADLS_ACUITY_SCORE: 72
ADLS_ACUITY_SCORE: 73
ADLS_ACUITY_SCORE: 72
ADLS_ACUITY_SCORE: 69
ADLS_ACUITY_SCORE: 73
ADLS_ACUITY_SCORE: 69
DEPENDENT_IADLS:: INDEPENDENT
ADLS_ACUITY_SCORE: 73
ADLS_ACUITY_SCORE: 69
ADLS_ACUITY_SCORE: 73
ADLS_ACUITY_SCORE: 69
ADLS_ACUITY_SCORE: 73
ADLS_ACUITY_SCORE: 73
ADLS_ACUITY_SCORE: 72

## 2025-04-10 NOTE — PROGRESS NOTES
04/10/25 1000   Appointment Info   Signing Clinician's Name / Credentials (PT) NINFA Pena   Student Supervision Direct Patient Contact Provided;Therapy services provided with the co-signing licensed therapist guiding and directing the services, and providing the skilled judgement and assessment throughout the session   Rehab Comments (PT) Parkinson's, dementia   Living Environment   People in Home spouse   Current Living Arrangements house   Home Accessibility stairs to enter home;stairs within home   Number of Stairs, Main Entrance 2   Number of Stairs, Within Home, Primary greater than 10 stairs   Transportation Anticipated family or friend will provide;car, drives self   Living Environment Comments Pt reports living spouse, has a lift recliner and has been spending majority of the day sitting watching TV. Unclear if pt is accurate historian.   Self-Care   Usual Activity Tolerance moderate   Current Activity Tolerance fair   Equipment Currently Used at Home cane, straight;lift device   Fall history within last six months yes   Number of times patient has fallen within last six months 3   General Information   Onset of Illness/Injury or Date of Surgery 04/09/25   Referring Physician Piotr Hernandez PA-C   Patient/Family Therapy Goals Statement (PT) none specifically stated   Pertinent History of Current Problem (include personal factors and/or comorbidities that impact the POC) Fco Arroyo is a 75 year old male admitted on 4/5/2025. He has PMH of HTN, T2DM, anxiety, and dementia who had recent fall on 3/31 resulting in nasal bone fractures. He has since had issues with epistaxis for which he has been to [facility] ED multiple times. He was most recently sent home with BL Rhinorockets in place though continued to have bleeding through these prompting ED visit today. However, during evaluations, patient became increasingly distressed and agitated requiring Code 21 call. Patient will be admitted for further  management given risk of severe bleed if he was to pull the rhinorockets at home.   Existing Precautions/Restrictions fall  (try to time with parkinson's meds)   Cognition   Affect/Mental Status (Cognition) WFL   Orientation Status (Cognition) oriented x 4   Follows Commands (Cognition) follows one-step commands;75-90% accuracy   Posture    Posture Forward head position;Protracted shoulders;Kyphosis   Range of Motion (ROM)   Range of Motion ROM is WFL   Strength (Manual Muscle Testing)   Strength Comments Grossly demonstrates >3/5  in BUE and BLE   Bed Mobility   Comment, (Bed Mobility) Supine>sitting EOB with MaxAx1   Transfers   Comment, (Transfers) Sit>stand with 4WW and ModAx1, bed slightly above standard chair height   Gait/Stairs (Locomotion)   Comment, (Gait/Stairs) Impaired clinical reasoning   Balance   Balance Comments Sitting fair requiring UE support, standing balance impaired with posterior lean   Clinical Impression   Criteria for Skilled Therapeutic Intervention Yes, treatment indicated   PT Diagnosis (PT) Impaired functional mobility   Influenced by the following impairments strength, pain, activity tolerance, balance, movement disorder - parkinsons   Functional limitations due to impairments bed mobility, transfers, gait, stairs, functional endurance   Clinical Presentation (PT Evaluation Complexity) stable   Clinical Presentation Rationale per clinical reasoning   Clinical Decision Making (Complexity) low complexity   Planned Therapy Interventions (PT) balance training;bed mobility training;gait training;home exercise program;motor coordination training;neuromuscular re-education;patient/family education;postural re-education;ROM (range of motion);strengthening;stretching;stair training;transfer training;progressive activity/exercise;risk factor education;home program guidelines   Risk & Benefits of therapy have been explained evaluation/treatment results reviewed;care plan/treatment goals  reviewed;patient;risks/benefits reviewed   PT Total Evaluation Time   PT Eval, Low Complexity Minutes (87473) 8   Physical Therapy Goals   PT Frequency 5x/week   PT Predicted Duration/Target Date for Goal Attainment 05/01/25   PT Goals Bed Mobility;Transfers;Gait;Stairs   PT: Bed Mobility Independent;Supine to/from sit;Rolling   PT: Transfers Modified independent;Sit to/from stand;Bed to/from chair   PT: Gait Modified independent;Rolling walker;Greater than 200 feet  (FWW)   PT: Stairs Modified independent;Greater than 10 stairs   PT Discharge Planning   PT Plan bed mobility, transfers, repeated STS, progress gait with FWW   PT Discharge Recommendation (DC Rec) Transitional Care Facility   PT Rationale for DC Rec Pt has low activity tolerance at baseline, global weakness and deconditioned, likely unable to tolerate 3 hours of therapy for ARU. Currently requiring some assist for all mobility. Recommend TCU to provide appropriate assistance with mobility and ADLs while recovering.   PT Brief overview of current status Ax1-2 for all mobility   PT Total Distance Amb During Session (feet) 0   Physical Therapy Time and Intention   Timed Code Treatment Minutes 44   Total Session Time (sum of timed and untimed services) 52

## 2025-04-10 NOTE — PROGRESS NOTES
[facility]    Medicine Progress Note - Hospitalist Service, GOLD TEAM 8    Date of Admission:  4/5/2025    Assessment & Plan   Fco Arroyo is a 75 year old male admitted on 4/5/2025. He has PMH of HTN, T2DM, anxiety, and dementia who had recent fall on 3/31 resulting in nasal bone fractures. He has since had issues with epistaxis for which he has been to [facility] ED multiple times. He was most recently sent home with BL Rhinorockets in place though continued to have bleeding through these prompting ED visit today. However, during evaluations, patient became increasingly distressed and agitated requiring Code 21 call. Patient will be admitted for further management given risk of severe bleed if he was to pull the rhinorockets at home.    Changes today  - stable agitation     Epistaxis  Nasal bone fractures 2/2 GLF 3/31  Patient tripped and fell while walking into [facility] on 3/31, was seen at ED that night and found to have acute fractures of the nasal bones and bony nasal septum. He was able to be discharged home, though he represented to [facility] ED on 4/4 due to epistaxis. Repeat imaging was stable. It was noted on exam, bony septum was visible and concerning for open and displaced fracture. ENT recommended Augmentin at that time which was started and he was discharged home. He presented again early this morning to [facility] ED for ongoing bleeding, and decision was made to place bilateral 5.5cm Rhino Rocket. Unfortunately, he continued to bleed around the packing and had blood coming out of his eyes. Decision was made to transfer to [facility] for possible IR embolization. Upon presentation to [facility] ED, he had slight dripping of blood. He was seen by ENT however, no exam/intervention could be performed as patient became acutely agitated, requiring 0.5mg Ativan IV, Haldol 5mg IV, Code 21, restraints, and eventually Precedex drip for patient's safety. There is concern that if patient  were to go home at this time, he may pull out packing and have large volume bleed. Family agrees with admission for further definitive management.   -started on precedex for ENT eval and rest as below  ENT recs below:  - Posisep in place in the right nasal passage - this is dissolvable and does not need to be removed.   - Surgiflo in place in the left nasal passage- this is dissolvable and does not need to be removed.  - Ok to discontinue antibiotics as non absorbable nasal packing has been taken out.  - Start nasal saline spray to b/l nasal passages Q4H starting tomorrow. This will keep nasal mucosa moist and help dissolve the packing  - avoid nose blowing, sneeze and cough with mouth open  - Vaseline or water based lubricant to anterior nares at night  - If patient experiences brisk rebleeding, spray Afrin and hold digital pressure on bilateral nostrils for 20 minutes. If bleeding continues after 20 minutes of digital pressure, repeat the above steps. If this fails to control the bleeding at that time, call ENT for further recommendations.   -- Continuous Pulse ox  -- Sitter present  -- Repeat hgb in AM; low threshold for repeat Hgb if rebleeding occurs    Acute Agitation  Anxiety  Dementia/Parkinson's disease  Family reports that patient is baseline A&Ox4, usually not aggressive.   -- started on Precedex gtt. Adding Buspar and risperdal and stopped precedex 4/9/25  -- Continue PTA Sinemet 25/100 TID     Left eyebrow laceration s/p repair  Secondary to recent fall. Was sutured in the ED on 3/31.  -- Sutures appear ready to removed soon, deferred at this time given agitation     HTN  -- Continue PTA coreg 3.125 BID  -- HOLD PTA ASA given acute bleeding and likely need for nasal packings for several days  -- HOLD PTA Lasix in setting of acute bleeding and decreased PO intake  -- Continue PTA Statin     T2DM  -- HOLD PTA Metformin and Glipizide  -- MSSI  -- ACHS gluc checks          BPH  -- Continue PTA Flomax  --  "Continue PTA Vero    To note, lives at home with wife.  Wife not at bedside during rounds today (or yesterday).          Diet: Advance Diet as Tolerated: Regular Diet Adult; Regular Diet Adult  Snacks/Supplements Adult: Ensure Enlive; With Meals  Snacks/Supplements Adult: Glucerna; With Meals  Snacks/Supplements Adult: Magic Cup; With Meals    DVT Prophylaxis: Pneumatic Compression Devices  Jung Catheter: Not present  Lines: None     Cardiac Monitoring: None  Code Status: Full Code      Clinically Significant Risk Factors                   # Hypertension: Noted on problem list            # Obesity: Estimated body mass index is 32.31 kg/m  as calculated from the following:    Height as of an earlier encounter on 4/5/25: 1.676 m (5' 6\").    Weight as of this encounter: 90.8 kg (200 lb 2.8 oz).      # Financial/Environmental Concerns: none         Social Drivers of Health    Food Insecurity: Unknown (4/9/2025)    Food Insecurity     Within the past 12 months, did you worry that your food would run out before you got money to buy more?: Patient unable to answer     Within the past 12 months, did the food you bought just not last and you didn t have money to get more?: Patient unable to answer   Housing Stability: Unknown (4/9/2025)    Housing Stability     Do you have housing? : Patient unable to answer     Are you worried about losing your housing?: Patient unable to answer   Tobacco Use: Medium Risk (3/24/2025)    [facility]    Patient History     Smoking Tobacco Use: Former     Smokeless Tobacco Use: Never   Financial Resource Strain: Unknown (4/9/2025)    Financial Resource Strain     Within the past 12 months, have you or your family members you live with been unable to get utilities (heat, electricity) when it was really needed?: Patient unable to answer   Transportation Needs: Unknown (4/9/2025)    Transportation Needs     Within the past 12 months, has lack " of transportation kept you from medical appointments, getting your medicines, non-medical meetings or appointments, work, or from getting things that you need?: Patient unable to answer   Interpersonal Safety: Unknown (4/9/2025)    Interpersonal Safety     Do you feel physically and emotionally safe where you currently live?: Patient unable to answer     Within the past 12 months, have you been hit, slapped, kicked or otherwise physically hurt by someone?: Patient unable to answer     Within the past 12 months, have you been humiliated or emotionally abused in other ways by your partner or ex-partner?: Patient unable to answer          Disposition Plan     Medically Ready for Discharge: ready tomorrow    Mike Dominguez MD      Hospitalist Service, GOLD TEAM [address] [place] [address]  Securely message with [employer] (more info)  Text page via [facility] Paging/Directory   See signed in provider for up to date coverage information  ______________________________________________________________________    Interval History     Calmer and more interactive      Physical Exam   Vital Signs: Temp: 97.6  F (36.4  C) Temp src: Oral BP: 117/61 Pulse: 85   Resp: 20 SpO2: 97 % O2 Device: None (Room air)    Weight: 200 lbs 2.84 oz    Constitutional:       General: He is not in acute distress.     Appearance: He is not diaphoretic.   HENT:      Head:      Comments: Significant facial bruising, more so over left upper face and nose. No active bleeding  Cardiovascular:      Rate and Rhythm: Normal rate and regular rhythm.      Heart sounds: No murmur heard.  Pulmonary:      Effort: Pulmonary effort is normal.      Breath sounds: No wheezing, rhonchi or rales.   Skin:     General: Skin is warm and dry.   Neurological:      Mental Status: lethargic      Medical Decision Making         Data     I have personally reviewed the following data over the past 24 hrs:    9.3  \   11.2 (L)   / 191     140 106 17.9  /  110 (H)   3.6 24 0.89 \     ALT: 12 AST: 40 AP: 76 TBILI: 0.7   ALB: 3.5 TOT PROTEIN: 5.9 (L) LIPASE: N/A       Imaging results reviewed over the past 24 hrs:   No results found for this or any previous visit (from the past 24 hours).

## 2025-04-10 NOTE — CONSULTS
Care Management Initial Consult    General Information  Assessment completed with: Patient, Spouse     Type of CM/SW Visit: Initial Assessment    Primary Care Provider verified and updated as needed: Yes; Dr. Sue Melchor   Readmission within the last 30 days: no previous admission in last 30 days   Reason for Consult: discharge planning  Advance Care Planning: Advance Care Planning Reviewed: no concerns identified       Communication Assessment  Patient's communication style: spoken language (English or Bilingual)    Hearing Difficulty or Deaf: yes   Wear Glasses or Blind: other (see comments)    Cognitive  Cognitive/Neuro/Behavioral: WDL  Level of Consciousness: alert  Arousal Level: opens eyes spontaneously  Orientation: oriented x 4  Mood/Behavior: calm, cooperative  Best Language: 0 - No aphasia  Speech: clear, spontaneous, logical    Living Environment:   People in home: spouse     Current living Arrangements: house      Able to return to prior arrangements: yes     Family/Social Support:  Care provided by: self, spouse/significant other  Provides care for: no one  Marital Status:   Support system: Wife, Children          Description of Support System: Supportive, Involved    Support Assessment: Adequate family and caregiver support, Adequate social supports    Current Resources:   Patient receiving home care services: No  Community Resources: None  Equipment currently used at home: cane, straight, lift device  Supplies currently used at home: None    Employment/Financial:  Employment Status: retired        Financial Concerns: none   Referral to Financial Worker: No     Does the patient's insurance plan have a 3 day qualifying hospital stay waiver?  No    Lifestyle & Psychosocial Needs:  Social Drivers of Health     Food Insecurity: Unknown (4/9/2025)    Food Insecurity     Within the past 12 months, did you worry that your food would run out before you got money to buy more?: Patient unable to  answer     Within the past 12 months, did the food you bought just not last and you didn t have money to get more?: Patient unable to answer   Depression: Not at risk (6/12/2024)    Received from [facility]    PHQ-2     PHQ-2 TOTAL SCORE: 0   Housing Stability: Unknown (4/9/2025)    Housing Stability     Do you have housing? : Patient unable to answer     Are you worried about losing your housing?: Patient unable to answer   Tobacco Use: Medium Risk (3/24/2025)    Received from [facility]    Patient History     Smoking Tobacco Use: Former     Smokeless Tobacco Use: Never     Passive Exposure: Not on file   Financial Resource Strain: Unknown (4/9/2025)    Financial Resource Strain     Within the past 12 months, have you or your family members you live with been unable to get utilities (heat, electricity) when it was really needed?: Patient unable to answer   Alcohol Use: Not on file   Transportation Needs: Unknown (4/9/2025)    Transportation Needs     Within the past 12 months, has lack of transportation kept you from medical appointments, getting your medicines, non-medical meetings or appointments, work, or from getting things that you need?: Patient unable to answer   Physical Activity: Not on file   Interpersonal Safety: Unknown (4/9/2025)    Interpersonal Safety     Do you feel physically and emotionally safe where you currently live?: Patient unable to answer     Within the past 12 months, have you been hit, slapped, kicked or otherwise physically hurt by someone?: Patient unable to answer     Within the past 12 months, have you been humiliated or emotionally abused in other ways by your partner or ex-partner?: Patient unable to answer   Stress: Not on file   Social Connections: Socially Integrated (11/30/2024)    Received from [facility]    Social Connections     Do you often feel lonely or isolated from  those around you?: 0   Health Literacy: Not on file     Functional Status:  Prior to admission patient needed assistance:   Dependent ADLs:: Independent  Dependent IADLs:: Independent  Assesssment of Functional Status: Not at baseline with ADL Functioning, Not at baseline with mobility     Mental Health Status:  N/A    Chemical Dependency Status:  N/A            Values/Beliefs:  Spiritual, Cultural Beliefs, Voodoo Practices, Values that affect care: N/A              Discussed  Partnership in Safe Discharge Planning  document with patient/family: No    Additional Information:  SW received an update from primary provider that pt is medically ready for discharge. SW self consulted for discharge planning, as pt has TCU recs with PT and awaiting OT recs. SW met pt and spouse at bedside to complete initial assessment. Introduced self and role. SW verified PCP Dr. Sue Melchor and home address. Pt was not readmitted into the hospital. Pt was not interested in a HCD. Pt's support system includes his spouse and two adult sons. Prior to hospitalization, pt lived at home w/spouse with a pet bird. Pt doesn't provide care for anyone. Pt doesn't receive any home care or community resources. Pt uses a cane as needed at home, but prior to hospitalization pt was independent with I/ADLS, except spouse provides transportation for pt. Pt doesn't use any medical supplies at home. Pt is retired and receives social security as income. No other concerns reported.    SW spoke with pt about PT discharge disposition of TCU. Pt and spouse were open to plan, but wanted to wait and see what OT input is. SW provided medicare.gov list and asked pt and spouse to pick 10 preferences.    Next Steps: Awaiting OT eval. SW to follow up with pt and spouse to collect TCU preferences. SW to follow for updates and discharge planning.    LAN Omer, LGSW  6B   Ph: [phone]  Vocera: Luiz Harkins or 6B [facility] STEPHANY

## 2025-04-10 NOTE — PROGRESS NOTES
"CLINICAL NUTRITION SERVICES - ASSESSMENT NOTE    Malnutrition Status:    Patient does not meet two of the established criteria necessary for diagnosing malnutrition but is at risk for malnutrition    Registered Dietitian Interventions:  Encouraged wife to pre-schedule meals ahead of time per pt particular food preferences and limited engagement     Begin a variety of ONS with each meal tray (Ensure, Magic Cup, Glucerna)    Discussed strategies to increase energy content of meals per report of weight loss. Discussed importance of adequate protein for lean body mass preservation, and small frequent meals.     Future/Additional Recommendations:  Monitor for need to consider germania cts, weight trends, ONS preferences      REASON FOR ASSESSMENT  Positive admission nutrition risk screen (MST score 3 points, 2 for unsure weight loss, 1 for decreased appetite)    INFORMATION OBTAINED  Assessed patient in room. Accompanied by wife who provided majority of hx     NUTRITION HISTORY  Good appetite and intake at home but had been losing weight for unclear reason PTA. Wife feels may be related to consuming less fast food but etiology is questionable. Reports loss of ~20 lbs compared to prior UBW. Has not been taking any protein shakes or supplements PTA.     CURRENT NUTRITION ORDERS  Diet: Regular  Snacks/Supplements: None       CURRENT INTAKE/TOLERANCE  25% intake of 1 meal so far per I/O. Monitor trends/tolerances.  Nutrition since admission has been a challenge due to the temperature of foods, pt refusal to engage/order meals at time. Patient chuckled and states \"I am bull headed\". Patient states that acceptance vs refusal of PO will \"depend on my mood\". Writer encouraged patient's wife to pre-schedule meals via room service and provided w/ phone number to do so. Wife reports patient will not be in the hospital much longer.     Starting a variety of protein shakes w/ meal trays to optimize intake, and provided education on " "strategies to optimize PO/prevent further weight loss by increasing calories.     LABS  Nutrition-relevant labs: Reviewed    MEDICATIONS  Nutrition-relevant medications: Reviewed    ANTHROPOMETRICS  Height: Height: 1.676 m (5' 6\")    Admission Weight: 90.8 kg (200 lb 2.8 oz) (04/09/25 0401)   Most Recent Weight: 90.8 kg (200 lb 2.8 oz)  IBW: 64.5 kg  % IBW: 141%  BMI (kg/m ): Obesity Class I BMI 30-34.9  Weight History: Limited recent weight hx available. Large drop w/ current weight vs weight measure 4/5; however suspect weight from 4/5 potentially reported rather than measured. Continue to monitor trends.  Wt Readings from Last 10 Encounters:   04/10/25 90.8 kg (200 lb 2.8 oz)   04/05/25 99.8 kg (220 lb)   04/04/25 99.8 kg (220 lb)   03/31/25 99.8 kg (220 lb)   11/02/20 116.6 kg (257 lb)   10/21/20 116.6 kg (257 lb)   10/07/20 117.9 kg (260 lb)   10/04/20 117.9 kg (260 lb)   10/04/20 117.9 kg (260 lb)   06/05/20 111.1 kg (245 lb)       Dosing Weight: 71 kg, based on adjusted wt per admit wt 90.8 kg and IBW 64.5 kg    ASSESSED NUTRITION NEEDS  Estimated Energy Needs: 1775 - 2130 kcals/day (25 - 30 kcals/kg)  Justification: Maintenance  Estimated Protein Needs:  grams protein/day (1.2 - 1.5 grams of pro/kg)  Justification: Increased needs/lean body mass preservation   Estimated Fluid Needs: (1 mL/kcal)  Justification: Maintenance    SYSTEM FINDINGS    Skin/wounds: WOCN note 4/7 for diabetic ulcer to R great toe - initial assessment   GI symptoms: BM x1-2 daily per I/O     MALNUTRITION  % Intake: Decreased intake does not meet criteria vs difficult to assess given reported hx   % Weight Loss: > 5% in 1 month (severe)   Subcutaneous Fat Loss: None observed visual assessment only today   Muscle Loss: None observed \" \"   Fluid Accumulation/Edema: None noted  Malnutrition Diagnosis: Patient does not meet two of the established criteria necessary for diagnosing malnutrition but is at risk for " malnutrition  Malnutrition Present on Admission: No    NUTRITION DIAGNOSIS  Unintended weight loss related to unclear etiology as evidenced by report of 20 lb weight loss of unknown etiology PTA per wife    INTERVENTIONS  See nutrition interventions above    Goals  Patient to consume % of nutritionally adequate meal trays TID, or the equivalent with supplements/snacks.     Monitoring/Evaluation  Progress toward goals will be monitored and evaluated per policy.    Delonte Pressley, MS, RDN, LD, CNSC  Available by [employer] [facility] [employer] or phone   Vocera: M-F (7:00-3:30)  6B Clinical Dietitian   Weekend/Holiday (7:00-3:30) - Weekend Clinical Dietitian   6B RD desk: [phone]       **Clinical Dietitians are no longer available by pager.

## 2025-04-10 NOTE — PLAN OF CARE
Goal Outcome Evaluation:      Plan of Care Reviewed With: patient, spouse    Overall Patient Progress: improvingOverall Patient Progress: improving    Outcome Evaluation: SW completed initial CMA. SW to follow for updates and discharge planning.

## 2025-04-10 NOTE — PLAN OF CARE
/79   Pulse 86   Temp 97.6  F (36.4  C) (Oral)   Resp 20   Wt 90.8 kg (200 lb 2.8 oz)   SpO2 97%   BMI 32.31 kg/m      Cardiac: SR. VSS.  Neuro: AOx4.   Respiratory: Sating > 92% on RA.  Pain/Nausea/PRN: Shoulder pain managed with PRN Tylenol.  Diet: Regular diet.  LDA: L + R PIV - SL, primofit.  GI/:   Skin: No new deficits noted.  Mobility: A2 w/ Marcia Steady    Goal Outcome Evaluation:      Plan of Care Reviewed With: patient, spouse    Overall Patient Progress: improvingOverall Patient Progress: improving    Outcome Evaluation: AOx4. Shoulder pain managed with PRN Tylenol.

## 2025-04-10 NOTE — PROGRESS NOTES
Care Management Follow Up    Length of Stay (days): 5    Expected Discharge Date: 04/14/2025     Concerns to be Addressed: discharge planning     Patient plan of care discussed at interdisciplinary rounds: Yes    Anticipated Discharge Disposition: Transitional Care  Anticipated Discharge Services: None  Anticipated Discharge DME: None    Patient/family educated on Medicare website which has current facility and service quality ratings: yes  Education Provided on the Discharge Plan: yes  Patient/Family in Agreement with the Plan: yes     Referrals Placed by CM/SW:     Pending:    [facility] [place]  [address] [place]  [address]  Admissions Kelly: [phone]  Main: [phone]  F: [phone]  F: [phone] (nursing station fax)  Nurse to Nurse P: [phone]   4/10: SW sent initial EPIC referral.    [facility]   [address] [place] [address]  P: [phone]  Admissions: [phone]   F: [phone]    4/10: SW sent initial EPIC referral.    [facility]  [address] [place]  [address]  P: [phone]  F: [phone]   4/10: SW sent initial EPIC referral.    [facility]  [address] [place]  [address]  P: [phone]  F: [phone]   4/10: SW sent initial EPIC referral.     Private pay costs discussed: Not applicable    Discussed  Partnership in Safe Discharge Planning  document with patient/family: No     Handoff Completed: No, handoff not indicated or clinically appropriate    Additional Information:  OT evaluated pt and also put in TCU recs. SW met pt and spouse at bedside, introduced self and role. SW collected TCU preferences from pt and spouse. SW sent TCU referrals.    Next Steps: Awaiting TCU to review pt. SW to follow for updates and discharge planning.    LAN Omer, LGSW  6B   Ph: [phone]  Vocera: Luiz Harkins or 6B C SW

## 2025-04-10 NOTE — PLAN OF CARE
Hours of Care: [id number]    Neuro: A&Ox3, calm and cooperative throughout shift.   Cardiac: SR. VSS.   Respiratory: Sating >95% on RA.  GI/: Adequate urine output via primofit. No BM during shift.  Diet/appetite: Tolerating regular diet.   Activity:  Not OOB during shift. OT/PT consulted.  Pain: At acceptable level on current regimen. Shoulder pain managed w/ PRN tylenol.  Skin: No new deficits noted.  LDA's: L & R PIV SL, external catheter    Plan: Follow up with provider for suture removal prior to discharge. Continue to monitor and follow POC. Notify primary team with changes.       Goal Outcome Evaluation:    Overall Patient Progress: improvingOverall Patient Progress: improving  Outcome Evaluation: Pt alert & orientedx3 during shift, pleasent. VSS.    Problem: Delirium  Goal: Improved Behavioral Control  Outcome: Progressing  Intervention: Minimize Safety Risk  Recent Flowsheet Documentation  Taken 4/10/2025 6717 by Merle Quintanilla, RN  Trust Relationship/Rapport:   care explained   reassurance provided   thoughts/feelings acknowledged     Problem: Delirium  Goal: Improved Attention and Thought Clarity  Outcome: Progressing

## 2025-04-10 NOTE — PROGRESS NOTES
04/10/25 0156   Appointment Info   Signing Clinician's Name / Credentials (OT) Shanon Roadner, OTR/L   Living Environment   People in Home spouse   Current Living Arrangements house   Home Accessibility stairs to enter home;stairs within home   Number of Stairs, Main Entrance 3   Number of Stairs, Within Home, Primary greater than 10 stairs   Transportation Anticipated family or friend will provide;car, drives self   Living Environment Comments Lives with wife in [facility] Able to live on main level though has a basement. Pt has sunk in living room with 1 step. Both tub shower and walk in shower present -- prefers to use the tub. No GB present. Tall toilet with railing. They have a shower stool in the garage somewhere. Sleeps in bed and lift chair, most often the lift chair.   Self-Care   Usual Activity Tolerance moderate   Current Activity Tolerance fair   Regular Exercise No   Equipment Currently Used at Home grab bar, tub/shower;cane, straight;raised toilet seat  (lift chair)   Fall history within last six months yes   Number of times patient has fallen within last six months 1  (at [employer], per wife)   Activity/Exercise/Self-Care Comment Using cane on and off prior to fall. Wife reports they have a FWW and 4WW also at home. Wears a CAM boot on R foot for ulcer. Was totally I with ADLs prior to fall. Wife and pt are both retired, wife is able to assist some at home but has her own health issues so can't do any lifting of pt and limited physical tasks for pt (e.g., helping him get dressed). Has lift chair.   Instrumental Activities of Daily Living (IADL)   Previous Responsibilities shopping;driving;yardwork  (washes dishes)   IADL Comments Wife able to assist with IADLs   General Information   Onset of Illness/Injury or Date of Surgery 04/05/25   Referring Physician Rebecca Stahl, DO   Patient/Family Therapy Goal Statement (OT) Get stronger   Additional Occupational Profile Info/Pertinent History of Current  Problem Fco Arroyo is a 75 year old male admitted on 4/5/2025. He has PMH of HTN, T2DM, anxiety, and dementia who had recent fall on 3/31 resulting in nasal bone fractures. He has since had issues with epistaxis for which he has been to [facility] ED multiple times. He was most recently sent home with BL Rhinorockets in place though continued to have bleeding through these prompting ED visit today. However, during evaluations, patient became increasingly distressed and agitated requiring Code 21 call. Patient will be admitted for further management given risk of severe bleed if he was to pull the rhinorockets at home.   Existing Precautions/Restrictions fall   Cognitive Status Examination   Orientation Status orientation to person, place and time   Cognitive Status Comments Pt is grossly oriented to time, place, and situation. H/o Dementia, will monitor. Wife answering majority of PLOF questions this date. Chair alarm on for safety.   Pain Assessment   Patient Currently in Pain Yes, see Vital Sign flowsheet   Posture   Posture Comments Flexed posture while standing, unable to stand fully upright with shoulders retracted   Range of Motion Comprehensive   Comment, General Range of Motion Pt has h/o rotator cuff tear in L shoulder, L shoulder flexion and abduction quite limited at baseline (< 90 degrees against gravity). R UE is WNL   Strength Comprehensive (MMT)   Comment, General Manual Muscle Testing (MMT) Assessment L UE < 3/5, R UE strength is grossly functional   Coordination   Fine Motor Coordination Opposition appears WFL   Bed Mobility   Bed Mobility supine-sit   Supine-Sit [place] (Bed Mobility) minimum assist (75% patient effort)   Comment (Bed Mobility) Min A supine > EOB, mod A scoot hips towards EOB   Transfers   Transfer Comments CGA-min A x 1 with FWW bed > chair   Balance   Balance Comments Unsteady on feet, using FWW for safety   Bathing Assessment/Intervention   Comment, (Bathing) max A to wash  back and legs   Lower Body Dressing Assessment/Training   Broome Level (Lower Body Dressing) don;socks;dependent (less than 25% patient effort)   Comment, (Lower Body Dressing) Difficulty reaching feet   Grooming Assessment/Training   [place]ome Level (Grooming) set up;verbal cues   Position (Grooming) supported sitting   Comment, (Grooming) Set up of toothbrush and mouthwash, v/c for technique while seated   Toileting   Comment, (Toileting) A x 1 - 2 per clinical judgment   Clinical Impression   Criteria for Skilled Therapeutic Interventions Met (OT) Yes, treatment indicated   OT Diagnosis Decreased ADL I   OT Problem List-Impairments impacting ADL problems related to;activity tolerance impaired;balance;cognition;range of motion (ROM);strength;mobility   Assessment of Occupational Performance 5 or more Performance Deficits   Identified Performance Deficits dressing, toileting, functional mobility, bathing, all IADLs   Clinical Decision Making Complexity (OT) problem focused assessment/low complexity   Risk & Benefits of therapy have been explained evaluation/treatment results reviewed;care plan/treatment goals reviewed   Clinical Impression Comments Pt to benefit from skilled OT to address above deficits. See daily flowsheet for details regarding tx provided.   OT Total Evaluation Time   OT Eval Low Complexity Minutes (34868) 9   OT Goals   Therapy Frequency (OT) 5 times/week   OT Predicted Duration/Target Date for Goal Attainment 04/24/25   OT Goals Lower Body Dressing;Lower Body Bathing;Hygiene/Grooming;Toilet Transfer/Toileting   OT: Hygiene/Grooming modified independent   OT: Lower Body Dressing Modified independent   OT: Lower Body Bathing Modified independent   OT: Toilet Transfer/Toileting Modified independent;toilet transfer;cleaning and garment management   OT Discharge Planning   OT Plan OT: monitor cog, transfers, ADLs, OOB activity   OT Discharge Recommendation (DC Rec) Transitional Care  Facility   OT Rationale for DC Rec Pt is significantly below functional baseline -- typically I/mod I with mobility (uses cane only occasionally) and self cares. Wife is not able to physically assist pt much but can help with some IADLs. Recommend TCU for return to functional baseline prior to return home.   OT Brief overview of current status Pt up A x 1 with FWW   OT Total Distance Amb During Session (feet) 2   Total Session Time   Total Session Time (sum of timed and untimed services) 9

## 2025-04-11 ENCOUNTER — APPOINTMENT (OUTPATIENT)
Dept: OCCUPATIONAL THERAPY | Facility: CLINIC | Age: 75
DRG: 155 | End: 2025-04-11
Payer: COMMERCIAL

## 2025-04-11 ENCOUNTER — APPOINTMENT (OUTPATIENT)
Dept: PHYSICAL THERAPY | Facility: CLINIC | Age: 75
DRG: 155 | End: 2025-04-11
Payer: COMMERCIAL

## 2025-04-11 VITALS
DIASTOLIC BLOOD PRESSURE: 79 MMHG | SYSTOLIC BLOOD PRESSURE: 153 MMHG | RESPIRATION RATE: 18 BRPM | WEIGHT: 200.18 LBS | OXYGEN SATURATION: 97 % | HEART RATE: 73 BPM | TEMPERATURE: 97.5 F | BODY MASS INDEX: 32.31 KG/M2

## 2025-04-11 LAB
ALBUMIN SERPL BCG-MCNC: 3.7 G/DL (ref 3.5–5.2)
ALP SERPL-CCNC: 79 U/L (ref 40–150)
ALT SERPL W P-5'-P-CCNC: 14 U/L (ref 0–70)
ANION GAP SERPL CALCULATED.3IONS-SCNC: 12 MMOL/L (ref 7–15)
AST SERPL W P-5'-P-CCNC: 30 U/L (ref 0–45)
BILIRUB SERPL-MCNC: 0.5 MG/DL
BUN SERPL-MCNC: 17.2 MG/DL (ref 8–23)
CALCIUM SERPL-MCNC: 9.2 MG/DL (ref 8.8–10.4)
CHLORIDE SERPL-SCNC: 107 MMOL/L (ref 98–107)
CREAT SERPL-MCNC: 0.85 MG/DL (ref 0.67–1.17)
EGFRCR SERPLBLD CKD-EPI 2021: 90 ML/MIN/1.73M2
ERYTHROCYTE [DISTWIDTH] IN BLOOD BY AUTOMATED COUNT: 14 % (ref 10–15)
GLUCOSE BLDC GLUCOMTR-MCNC: 131 MG/DL (ref 70–99)
GLUCOSE BLDC GLUCOMTR-MCNC: 152 MG/DL (ref 70–99)
GLUCOSE BLDC GLUCOMTR-MCNC: 181 MG/DL (ref 70–99)
GLUCOSE BLDC GLUCOMTR-MCNC: 216 MG/DL (ref 70–99)
GLUCOSE SERPL-MCNC: 113 MG/DL (ref 70–99)
HCO3 SERPL-SCNC: 24 MMOL/L (ref 22–29)
HCT VFR BLD AUTO: 34.7 % (ref 40–53)
HGB BLD-MCNC: 11.6 G/DL (ref 13.3–17.7)
MCH RBC QN AUTO: 31.3 PG (ref 26.5–33)
MCHC RBC AUTO-ENTMCNC: 33.4 G/DL (ref 31.5–36.5)
MCV RBC AUTO: 94 FL (ref 78–100)
PLATELET # BLD AUTO: 202 10E3/UL (ref 150–450)
POTASSIUM SERPL-SCNC: 3.5 MMOL/L (ref 3.4–5.3)
PROT SERPL-MCNC: 6.1 G/DL (ref 6.4–8.3)
RBC # BLD AUTO: 3.71 10E6/UL (ref 4.4–5.9)
SODIUM SERPL-SCNC: 143 MMOL/L (ref 135–145)
WBC # BLD AUTO: 8.9 10E3/UL (ref 4–11)

## 2025-04-11 PROCEDURE — 97116 GAIT TRAINING THERAPY: CPT | Mod: GP

## 2025-04-11 PROCEDURE — 36415 COLL VENOUS BLD VENIPUNCTURE: CPT

## 2025-04-11 PROCEDURE — 250N000013 HC RX MED GY IP 250 OP 250 PS 637: Performed by: HOSPITALIST

## 2025-04-11 PROCEDURE — 97535 SELF CARE MNGMENT TRAINING: CPT | Mod: GO

## 2025-04-11 PROCEDURE — 80053 COMPREHEN METABOLIC PANEL: CPT

## 2025-04-11 PROCEDURE — 97530 THERAPEUTIC ACTIVITIES: CPT | Mod: GO

## 2025-04-11 PROCEDURE — 99232 SBSQ HOSP IP/OBS MODERATE 35: CPT | Performed by: HOSPITALIST

## 2025-04-11 PROCEDURE — 250N000013 HC RX MED GY IP 250 OP 250 PS 637

## 2025-04-11 PROCEDURE — 120N000003 HC R&B IMCU UMMC

## 2025-04-11 PROCEDURE — 97530 THERAPEUTIC ACTIVITIES: CPT | Mod: GP

## 2025-04-11 PROCEDURE — 85041 AUTOMATED RBC COUNT: CPT

## 2025-04-11 RX ORDER — GUAIFENESIN 200 MG/10ML
200 LIQUID ORAL EVERY 4 HOURS PRN
Status: DISCONTINUED | OUTPATIENT
Start: 2025-04-11 | End: 2025-04-12 | Stop reason: HOSPADM

## 2025-04-11 RX ADMIN — ACETAMINOPHEN 650 MG: 325 TABLET, FILM COATED ORAL at 14:30

## 2025-04-11 RX ADMIN — CARBIDOPA AND LEVODOPA 1 TABLET: 25; 100 TABLET, EXTENDED RELEASE ORAL at 08:48

## 2025-04-11 RX ADMIN — CARVEDILOL 3.12 MG: 3.12 TABLET, FILM COATED ORAL at 17:22

## 2025-04-11 RX ADMIN — BUSPIRONE HYDROCHLORIDE 5 MG: 5 TABLET ORAL at 08:47

## 2025-04-11 RX ADMIN — Medication: at 21:13

## 2025-04-11 RX ADMIN — ROSUVASTATIN CALCIUM 20 MG: 20 TABLET, FILM COATED ORAL at 08:49

## 2025-04-11 RX ADMIN — CARVEDILOL 3.12 MG: 3.12 TABLET, FILM COATED ORAL at 08:48

## 2025-04-11 RX ADMIN — RISPERIDONE 0.25 MG: 0.25 TABLET, FILM COATED ORAL at 08:49

## 2025-04-11 RX ADMIN — MIRABEGRON 50 MG: 50 TABLET, EXTENDED RELEASE ORAL at 08:49

## 2025-04-11 RX ADMIN — RISPERIDONE 0.25 MG: 0.25 TABLET, FILM COATED ORAL at 21:13

## 2025-04-11 RX ADMIN — BUSPIRONE HYDROCHLORIDE 5 MG: 5 TABLET ORAL at 21:12

## 2025-04-11 RX ADMIN — GUAIFENESIN 200 MG: 200 SOLUTION ORAL at 00:24

## 2025-04-11 RX ADMIN — NYSTATIN: 100000 CREAM TOPICAL at 21:12

## 2025-04-11 RX ADMIN — CARBIDOPA AND LEVODOPA 1 TABLET: 25; 100 TABLET, EXTENDED RELEASE ORAL at 14:10

## 2025-04-11 RX ADMIN — TAMSULOSIN HYDROCHLORIDE 0.4 MG: 0.4 CAPSULE ORAL at 08:49

## 2025-04-11 RX ADMIN — CARBIDOPA AND LEVODOPA 1 TABLET: 25; 100 TABLET, EXTENDED RELEASE ORAL at 21:12

## 2025-04-11 RX ADMIN — ACETAMINOPHEN 650 MG: 325 TABLET, FILM COATED ORAL at 08:33

## 2025-04-11 RX ADMIN — GUAIFENESIN 200 MG: 200 SOLUTION ORAL at 21:12

## 2025-04-11 RX ADMIN — NYSTATIN: 100000 CREAM TOPICAL at 08:49

## 2025-04-11 ASSESSMENT — ACTIVITIES OF DAILY LIVING (ADL)
ADLS_ACUITY_SCORE: 69
ADLS_ACUITY_SCORE: 69
ADLS_ACUITY_SCORE: 65
ADLS_ACUITY_SCORE: 69
ADLS_ACUITY_SCORE: 65
ADLS_ACUITY_SCORE: 69
ADLS_ACUITY_SCORE: 69
ADLS_ACUITY_SCORE: 65
ADLS_ACUITY_SCORE: 65
ADLS_ACUITY_SCORE: 69
ADLS_ACUITY_SCORE: 65
ADLS_ACUITY_SCORE: 69
ADLS_ACUITY_SCORE: 69
ADLS_ACUITY_SCORE: 65
ADLS_ACUITY_SCORE: 69

## 2025-04-11 NOTE — PLAN OF CARE
Vitals: /61   Pulse 85   Temp 97.8  F (36.6  C) (Oral)   Resp 26   Wt 91.3 kg (201 lb 4.5 oz)   SpO2 97%   BMI 32.49 kg/m    BMI= Body mass index is 32.49 kg/m .     Neuro: A&Ox4.   Cardiac: Afebrile, SR. VSS.   Respiratory: Sating > 92% on RA.  GI/: Adequate urine output. BM X1 Dark and Tarry  Diet/appetite: Tolerating Regular diet. Eating well.  Activity:  Assist of 2, up to chair with gait belt and walker  Pain: At acceptable level on current regimen. Tylenol X 2  Skin: No new deficits noted. Sutures to nose and L eyebrow removed  LDA's: No new deficit    Plan: Continue with POC. Notify primary team with changes.     Goal Outcome Evaluation:      Plan of Care Reviewed With: patient    Overall Patient Progress: improvingOverall Patient Progress: improving    Outcome Evaluation: AAO with intermittent forgetfulness, VSS

## 2025-04-11 NOTE — PROGRESS NOTES
[facility]    Medicine Progress Note - Hospitalist Service, GOLD TEAM 8    Date of Admission:  4/5/2025    Assessment & Plan   Fco Arroyo is a 75 year old male admitted on 4/5/2025. He has PMH of HTN, T2DM, anxiety, and dementia who had recent fall on 3/31 resulting in nasal bone fractures. He has since had issues with epistaxis for which he has been to [facility] ED multiple times. He was most recently sent home with BL Rhinorockets in place though continued to have bleeding through these prompting ED visit today. However, during evaluations, patient became increasingly distressed and agitated requiring Code 21 call. Patient will be admitted for further management given risk of severe bleed if he was to pull the rhinorockets at home.    Changes today  - no recurrence of agitation and good PO intake  - anticipate transfer to TCU tomorrow     Epistaxis  Nasal bone fractures 2/2 GLF 3/31  Patient tripped and fell while walking into [facility] on 3/31, was seen at ED that night and found to have acute fractures of the nasal bones and bony nasal septum. He was able to be discharged home, though he represented to [facility] ED on 4/4 due to epistaxis. Repeat imaging was stable. It was noted on exam, bony septum was visible and concerning for open and displaced fracture. ENT recommended Augmentin at that time which was started and he was discharged home. He presented again early this morning to [facility] ED for ongoing bleeding, and decision was made to place bilateral 5.5cm Rhino Rocket. Unfortunately, he continued to bleed around the packing and had blood coming out of his eyes. Decision was made to transfer to [facility] for possible IR embolization. Upon presentation to [facility] ED, he had slight dripping of blood. He was seen by ENT however, no exam/intervention could be performed as patient became acutely agitated, requiring 0.5mg Ativan IV, Haldol 5mg IV, Code 21, restraints, and eventually  Precedex drip for patient's safety. There is concern that if patient were to go home at this time, he may pull out packing and have large volume bleed. Family agrees with admission for further definitive management.   -started on precedex for ENT eval and rest as below  ENT recs below:  - Posisep in place in the right nasal passage - this is dissolvable and does not need to be removed.   - Surgiflo in place in the left nasal passage- this is dissolvable and does not need to be removed.  - Ok to discontinue antibiotics as non absorbable nasal packing has been taken out.  - Start nasal saline spray to b/l nasal passages Q4H starting tomorrow. This will keep nasal mucosa moist and help dissolve the packing  - avoid nose blowing, sneeze and cough with mouth open  - Vaseline or water based lubricant to anterior nares at night  - If patient experiences brisk rebleeding, spray Afrin and hold digital pressure on bilateral nostrils for 20 minutes. If bleeding continues after 20 minutes of digital pressure, repeat the above steps. If this fails to control the bleeding at that time, call ENT for further recommendations.   -- Continuous Pulse ox    Acute Agitation  Anxiety  Dementia/Parkinson's disease  Family reports that patient is baseline A&Ox4, usually not aggressive.   -- started on Precedex gtt. Adding Buspar and risperdal and stopped precedex 4/9/25  -- Continue PTA Sinemet 25/100 TID     Left eyebrow laceration s/p repair  Secondary to recent fall. Was sutured in the ED on 3/31.  -- Sutures appear ready to removed soon, deferred at this time given agitation     HTN  -- Continue PTA coreg 3.125 BID  -- HOLD PTA ASA given acute bleeding and likely need for nasal packings for several days  -- HOLD PTA Lasix in setting of acute bleeding and decreased PO intake  -- Continue PTA Statin     T2DM  -- HOLD PTA Metformin and Glipizide  -- MSSI  -- ACHS gluc checks          BPH  -- Continue PTA Flomax  -- Continue PTA  "Vero    To note, lives at home with wife.         Diet: Advance Diet as Tolerated: Regular Diet Adult; Regular Diet Adult  Snacks/Supplements Adult: Ensure Enlive; With Meals  Snacks/Supplements Adult: Glucerna; With Meals  Snacks/Supplements Adult: Magic Cup; With Meals    DVT Prophylaxis: Pneumatic Compression Devices  Jung Catheter: Not present  Lines: None     Cardiac Monitoring: None  Code Status: Full Code      Clinically Significant Risk Factors                   # Hypertension: Noted on problem list            # Obesity: Estimated body mass index is 32.49 kg/m  as calculated from the following:    Height as of an earlier encounter on 4/5/25: 1.676 m (5' 6\").    Weight as of this encounter: 91.3 kg (201 lb 4.5 oz).      # Financial/Environmental Concerns: none         Social Drivers of Health    Food Insecurity: Unknown (4/9/2025)    Food Insecurity     Within the past 12 months, did you worry that your food would run out before you got money to buy more?: Patient unable to answer     Within the past 12 months, did the food you bought just not last and you didn t have money to get more?: Patient unable to answer   Housing Stability: Unknown (4/9/2025)    Housing Stability     Do you have housing? : Patient unable to answer     Are you worried about losing your housing?: Patient unable to answer   Tobacco Use: Medium Risk (3/24/2025)    Received from [facility]    Patient History     Smoking Tobacco Use: Former     Smokeless Tobacco Use: Never   Financial Resource Strain: Unknown (4/9/2025)    Financial Resource Strain     Within the past 12 months, have you or your family members you live with been unable to get utilities (heat, electricity) when it was really needed?: Patient unable to answer   Transportation Needs: Unknown (4/9/2025)    Transportation Needs     Within the past 12 months, has lack of transportation kept you from medical appointments, getting your " medicines, non-medical meetings or appointments, work, or from getting things that you need?: Patient unable to answer   Interpersonal Safety: Unknown (4/9/2025)    Interpersonal Safety     Do you feel physically and emotionally safe where you currently live?: Patient unable to answer     Within the past 12 months, have you been hit, slapped, kicked or otherwise physically hurt by someone?: Patient unable to answer     Within the past 12 months, have you been humiliated or emotionally abused in other ways by your partner or ex-partner?: Patient unable to answer          Disposition Plan     Medically Ready for Discharge: ready tomorrow    Mike Dominguez MD      Hospitalist Service, GOLD TEAM [address] [place] [address]  Securely message with [employer] (more info)  Text page via [employer] Paging/Directory   See signed in provider for up to date coverage information  ______________________________________________________________________    Interval History     No acute issues, eating well and appears calm and interactive      Physical Exam   Vital Signs: Temp: 97.5  F (36.4  C) Temp src: Oral BP: 123/62 Pulse: 91   Resp: (!) 117 SpO2: 97 % O2 Device: None (Room air)    Weight: 201 lbs 4.48 oz    Constitutional:       General: He is not in acute distress.     Appearance: He is not diaphoretic.   HENT:      Head:      Comments: Significant facial bruising, more so over left upper face and nose. No active bleeding  Cardiovascular:      Rate and Rhythm: Normal rate and regular rhythm.      Heart sounds: No murmur heard.  Pulmonary:      Effort: Pulmonary effort is normal.      Breath sounds: No wheezing, rhonchi or rales.   Skin:     General: Skin is warm and dry.   Neurological:      Mental Status: lethargic      Medical Decision Making         Data     I have personally reviewed the following data over the past 24 hrs:    8.9  \   11.6 (L)   / 202     143 107 17.2 /  181 (H)   3.5 24 0.85 \      ALT: 14 AST: 30 AP: 79 TBILI: 0.5   ALB: 3.7 TOT PROTEIN: 6.1 (L) LIPASE: N/A       Imaging results reviewed over the past 24 hrs:   No results found for this or any previous visit (from the past 24 hours).

## 2025-04-11 NOTE — PROGRESS NOTES
Care Management Follow Up    Length of Stay (days): 6    Expected Discharge Date: 04/12/2025     Concerns to be Addressed: discharge planning     Patient plan of care discussed at interdisciplinary rounds: Yes    Anticipated Discharge Disposition: Transitional Care  Anticipated Discharge Services: None  Anticipated Discharge DME: None    Patient/family educated on Medicare website which has current facility and service quality ratings: yes   Education Provided on the Discharge Plan: yes   Patient/Family in Agreement with the Plan: yes     Referrals Placed by CM/SW:      Accepted:    [facility]  [address] [facility] [place]  [address]  Admissions Kelly: [phone]  Main: [phone]  F: [phone]  F: [phone] (nursing station fax)  Nurse to Nurse P: [phone]   4/11: Can take pt tomorrow after 1:00 pm. The latest they can take orders is 2:00 pm. Would need [employer] insurance auth faxed to them.   SW sent initial EPIC referral.    Pending:    [facility]  [address] [place]  [address]  P: [phone]  F: [phone]   4/10: SW sent initial EPIC referral.    Declined:     [facility]   [address] [place] [address]  P: [phone]  Admissions: [phone]   F: [phone]    4/11: Bed Not Available  SW sent initial EPIC referral.     [facility]  [address] [place]  [address]  P: [phone]  F: [phone]   4/10: Cannot meet pt's needs  SW sent initial EPIC referral.    Private pay costs discussed: Not applicable    Discussed  Partnership in Safe Discharge Planning  document with patient/family: No     Handoff Completed: No, handoff not indicated or clinically appropriate    Additional Information:  STEPHANY followed up with pending referrals. SW received an acceptance from [facility] in [place] and can take pt tomorrow after 1:00 pm. The latest they can take orders is 2:00 pm. Would need [employer]  [employer] insurance auth faxed to them.     STEPHANY called [employer] and completed insurance auth and received acceptance number [id number] and received [employer] insurance auth via fax. STEPHANY faxed [employer] forms to admissions at [facility] ([phone]) and placed forms in pt's purple hard chart folder.     STEPHANY called bedside nurse and introduced self and role. STEPHANY asked nurse if pt can sit in a chair and nurse reported pt actually has been in a chair since early this morning.     STEPHANY called pt's spouse and introduced self and role. SW discussed discharge plan and she is open to pt discharging to [facility]. Spouse reported she can handle providing transportation for pt and SW informed her that they can go to facility after 1:00 pm tomorrow. Spouse is agreeable to plan.    STEPHANY met pt at bedside and introduced self and role. SW explained discharge plan to [facility] and spouse providing transportation. Pt is agreeable to plan and expressed no further concerns.     STEPHANY updated bedside nurse, charge nurse, and primary provider of discharge plan.    STEPHANY completed [id number]. STEPHANY notified CHW to complete IMM. STEPHANY completed EHO.    Next Steps: WKND STEPHANY to send discharge orders to facility tomorrow. SW to follow for updates and discharge planning.    LAN Omer, LGSW  6B   Ph: [phone]  Vocera: Luiz Harkins or 6B [facility] STEPHANY

## 2025-04-11 NOTE — PLAN OF CARE
Goal Outcome Evaluation:      Plan of Care Reviewed With: patient    Overall Patient Progress: improvingOverall Patient Progress: improving    Outcome Evaluation: A&O, forgetful. Intermittent shoulder pain, given Tylenol. Had dry nagging cough, guifenessin ordered and given. VSS    Neuro: A&Ox4. forgetful  Cardiac: SR. BP slightly elevated, MD was paged, no treatment,monitor. BP lower on right arm.   Respiratory: Sating upper 90's on RA  GI/: Adequate urine output Premofit in place.  No BM overnight.  Diet/appetite: Tolerating regular diet. Needs assistance to order.  Activity:  Assist of 2 to chair/commode. Previous nurse stated the Marcia Steady worked well for him.  Pain: At acceptable level on current regimen. PRN Tylenol  Skin: No new deficits noted.  LDA's: Left and right PIV    Plan: Continue with POC. Notify primary team with changes. PT/OT. Tentative discharge of 4/14 to TCU.

## 2025-04-12 VITALS
RESPIRATION RATE: 22 BRPM | DIASTOLIC BLOOD PRESSURE: 55 MMHG | HEART RATE: 86 BPM | TEMPERATURE: 97.7 F | BODY MASS INDEX: 32.67 KG/M2 | SYSTOLIC BLOOD PRESSURE: 117 MMHG | WEIGHT: 202.38 LBS | OXYGEN SATURATION: 98 %

## 2025-04-12 PROBLEM — R04.0 EPISTAXIS DUE TO TRAUMA: Status: RESOLVED | Noted: 2025-04-05 | Resolved: 2025-04-12

## 2025-04-12 PROBLEM — N13.8 BENIGN PROSTATIC HYPERPLASIA WITH URINARY OBSTRUCTION: Status: ACTIVE | Noted: 2025-04-12

## 2025-04-12 PROBLEM — Z91.81 PERSONAL HISTORY OF FALL: Status: RESOLVED | Noted: 2025-04-05 | Resolved: 2025-04-12

## 2025-04-12 PROBLEM — N40.1 BENIGN PROSTATIC HYPERPLASIA WITH URINARY OBSTRUCTION: Status: ACTIVE | Noted: 2025-04-12

## 2025-04-12 PROBLEM — F03.C4: Chronic | Status: ACTIVE | Noted: 2025-04-12

## 2025-04-12 LAB
ALBUMIN SERPL BCG-MCNC: 3.6 G/DL (ref 3.5–5.2)
ALP SERPL-CCNC: 77 U/L (ref 40–150)
ALT SERPL W P-5'-P-CCNC: 13 U/L (ref 0–70)
ANION GAP SERPL CALCULATED.3IONS-SCNC: 10 MMOL/L (ref 7–15)
AST SERPL W P-5'-P-CCNC: 27 U/L (ref 0–45)
BILIRUB SERPL-MCNC: 0.3 MG/DL
BUN SERPL-MCNC: 16.8 MG/DL (ref 8–23)
CALCIUM SERPL-MCNC: 9.1 MG/DL (ref 8.8–10.4)
CHLORIDE SERPL-SCNC: 107 MMOL/L (ref 98–107)
CREAT SERPL-MCNC: 0.82 MG/DL (ref 0.67–1.17)
EGFRCR SERPLBLD CKD-EPI 2021: >90 ML/MIN/1.73M2
ERYTHROCYTE [DISTWIDTH] IN BLOOD BY AUTOMATED COUNT: 14 % (ref 10–15)
GLUCOSE BLDC GLUCOMTR-MCNC: 129 MG/DL (ref 70–99)
GLUCOSE BLDC GLUCOMTR-MCNC: 130 MG/DL (ref 70–99)
GLUCOSE BLDC GLUCOMTR-MCNC: 152 MG/DL (ref 70–99)
GLUCOSE BLDC GLUCOMTR-MCNC: 160 MG/DL (ref 70–99)
GLUCOSE SERPL-MCNC: 134 MG/DL (ref 70–99)
HCO3 SERPL-SCNC: 24 MMOL/L (ref 22–29)
HCT VFR BLD AUTO: 36.1 % (ref 40–53)
HGB BLD-MCNC: 11.7 G/DL (ref 13.3–17.7)
MCH RBC QN AUTO: 30.7 PG (ref 26.5–33)
MCHC RBC AUTO-ENTMCNC: 32.4 G/DL (ref 31.5–36.5)
MCV RBC AUTO: 95 FL (ref 78–100)
PLATELET # BLD AUTO: 211 10E3/UL (ref 150–450)
POTASSIUM SERPL-SCNC: 3.7 MMOL/L (ref 3.4–5.3)
PROT SERPL-MCNC: 6.1 G/DL (ref 6.4–8.3)
RBC # BLD AUTO: 3.81 10E6/UL (ref 4.4–5.9)
SODIUM SERPL-SCNC: 141 MMOL/L (ref 135–145)
WBC # BLD AUTO: 10.2 10E3/UL (ref 4–11)

## 2025-04-12 PROCEDURE — 250N000013 HC RX MED GY IP 250 OP 250 PS 637: Performed by: HOSPITALIST

## 2025-04-12 PROCEDURE — 250N000013 HC RX MED GY IP 250 OP 250 PS 637

## 2025-04-12 PROCEDURE — 99238 HOSP IP/OBS DSCHRG MGMT 30/<: CPT | Performed by: HOSPITALIST

## 2025-04-12 PROCEDURE — 84450 TRANSFERASE (AST) (SGOT): CPT

## 2025-04-12 PROCEDURE — 85014 HEMATOCRIT: CPT

## 2025-04-12 PROCEDURE — 85041 AUTOMATED RBC COUNT: CPT

## 2025-04-12 PROCEDURE — 84155 ASSAY OF PROTEIN SERUM: CPT

## 2025-04-12 PROCEDURE — 36415 COLL VENOUS BLD VENIPUNCTURE: CPT

## 2025-04-12 RX ORDER — RISPERIDONE 0.25 MG/1
0.25 TABLET ORAL 2 TIMES DAILY
DISCHARGE
Start: 2025-04-12

## 2025-04-12 RX ORDER — BUSPIRONE HYDROCHLORIDE 5 MG/1
5 TABLET ORAL 2 TIMES DAILY
DISCHARGE
Start: 2025-04-12

## 2025-04-12 RX ORDER — CIPROFLOXACIN 250 MG/1
500 TABLET, FILM COATED ORAL ONCE
Status: COMPLETED | OUTPATIENT
Start: 2025-04-12 | End: 2025-04-12

## 2025-04-12 RX ADMIN — TAMSULOSIN HYDROCHLORIDE 0.4 MG: 0.4 CAPSULE ORAL at 08:10

## 2025-04-12 RX ADMIN — CARVEDILOL 3.12 MG: 3.12 TABLET, FILM COATED ORAL at 08:10

## 2025-04-12 RX ADMIN — ROSUVASTATIN CALCIUM 20 MG: 20 TABLET, FILM COATED ORAL at 08:10

## 2025-04-12 RX ADMIN — CARBIDOPA AND LEVODOPA 1 TABLET: 25; 100 TABLET, EXTENDED RELEASE ORAL at 13:52

## 2025-04-12 RX ADMIN — MIRABEGRON 50 MG: 50 TABLET, EXTENDED RELEASE ORAL at 08:10

## 2025-04-12 RX ADMIN — ACETAMINOPHEN 650 MG: 325 TABLET, FILM COATED ORAL at 08:09

## 2025-04-12 RX ADMIN — GUAIFENESIN 200 MG: 200 SOLUTION ORAL at 15:40

## 2025-04-12 RX ADMIN — NYSTATIN: 100000 CREAM TOPICAL at 08:08

## 2025-04-12 RX ADMIN — CIPROFLOXACIN HYDROCHLORIDE 500 MG: 250 TABLET, FILM COATED ORAL at 08:08

## 2025-04-12 RX ADMIN — CARBIDOPA AND LEVODOPA 1 TABLET: 25; 100 TABLET, EXTENDED RELEASE ORAL at 08:10

## 2025-04-12 RX ADMIN — ACETAMINOPHEN 650 MG: 325 TABLET, FILM COATED ORAL at 15:48

## 2025-04-12 RX ADMIN — BUSPIRONE HYDROCHLORIDE 5 MG: 5 TABLET ORAL at 08:11

## 2025-04-12 RX ADMIN — RISPERIDONE 0.25 MG: 0.25 TABLET, FILM COATED ORAL at 08:10

## 2025-04-12 ASSESSMENT — ACTIVITIES OF DAILY LIVING (ADL)
ADLS_ACUITY_SCORE: 65

## 2025-04-12 NOTE — PROGRESS NOTES
DISCHARGE                         4/12/2025  4:19 PM  ----------------------------------------------------------------------------  Discharged to: TCU  Via: Wheel casey Van  Accompanied by: Family  Discharge Instructions: Regular diet, 2 Person assist with gait belt and walker for transfers,  medications, follow up appointments, when to call the MD, aftercare instructions.  Prescriptions: To be filled by TCU pharmacy; medication list reviewed & sent with pt  Follow Up Appointments: To be setup by TCU  Belongings: All sent with pt  IV: d/c'd  Telemetry: d/c'd  Pt exhibits understanding of above discharge instructions; all questions answered.    Discharge Paperwork: Signed, copied, and sent to TCU with .

## 2025-04-12 NOTE — PLAN OF CARE
Documentation of Face to Face and Certification for Home Health Services    I certify that patient: Fco Arroyo is under my care and that I, or a nurse practitioner or physician's assistant working with me, had a face-to-face encounter that meets the physician face-to-face encounter requirements with this patient on: 4/12/2025.    This encounter with the patient was in whole, or in part, for the following medical condition, which is the primary reason for home health care:   Patient Active Problem List   Diagnosis    R PLANTAR FIBROMATOSIS    Sensorineural hearing loss, bilateral    Hyperlipidemia LDL goal <100    Morbid obesity (H)    Benign essential hypertension    Type 2 diabetes mellitus without complication, without long-term current use of insulin (H)    Central retinal vein occlusion of right eye, unspecified complication status (H)    Generalized anxiety disorder    Closed fracture of nasal bone, sequela    History of dementia    Parkinson's disease, unspecified whether dyskinesia present, unspecified whether manifestations fluctuate (H)    Benign prostatic hyperplasia with urinary obstruction    Severe dementia with anxiety (H)     .    I certify that, based on my findings, the following services are medically necessary home health services: Occupational Therapy and Physical Therapy.    My clinical findings support the need for the above services because: Occupational Therapy Services are needed to assess and treat cognitive ability and address ADL safety due to impairment in  low activity tolerance at baseline, global weakness and deconditioned, . and Physical Therapy Services are needed to assess and treat the following functional impairments: problems related to;activity tolerance impaired;balance;cognition;range of motion (ROM);strength;mobility.    Further, I certify that my clinical findings support that this patient is homebound (i.e. absences from home require considerable and taxing effort  and are for medical reasons or [facility] services or infrequently or of short duration when for other reasons) because: Requires assistance of another person or specialized equipment to access medical services because patient: Is prone to wander/get lost without assistance. and Is unable to operate assistive equipment on their own...    Based on the above findings. I certify that this patient is confined to the home and needs intermittent skilled nursing care, physical therapy and/or speech therapy.  The patient is under my care, and I have initiated the establishment of the plan of care.  This patient will be followed by a physician who will periodically review the plan of care.  Physician/Provider to provide follow up care: Sue Melchor    Attending hospital physician (the Medicare certified [facility]ECOS provider): Mike Dominguez MD  Physician Signature: See electronic signature associated with these discharge orders.  Date: 4/12/2025

## 2025-04-12 NOTE — PLAN OF CARE
Vitals: /66 (BP Location: Right arm, Cuff Size: Adult Regular)   Pulse 83   Temp 98.4  F (36.9  C) (Oral)   Resp 20   Wt 91.8 kg (202 lb 6.1 oz)   SpO2 98%   BMI 32.67 kg/m    BMI= Body mass index is 32.67 kg/m .     Neuro: A&OX 2-3, forgetful, Waxing and waning.  Cardiac: Afebrile, SR. VSS.   Respiratory: Sating > 93% on RA.  GI/: Jung Catheter Cares, Adequate urine output. No BM This shift  Diet/appetite: Tolerating Regular diet. Eating well.  Activity:  Assist of 2, up to chair with gait belt and walker  Pain: At acceptable level on current regimen. PRN Tylenol X 2  Skin: No new deficits noted.  LDA's: R PIV X 1, L PIV X 1    Plan: Continue with POC. Notify primary team with changes. Discharge to TCU    Goal Outcome Evaluation:      Plan of Care Reviewed With: patient    Overall Patient Progress: improvingOverall Patient Progress: improving    Outcome Evaluation: Alert and oriented to self, with forgetulness, Jung cath in place and intact., Two person assist with gait belt and walker for transfers.

## 2025-04-12 NOTE — PLAN OF CARE
Neuro: A&Ox1 (was oriented x4 at the start of the shift).   Cardiac: SR. VSS.   Respiratory: Sating >95% on RA.  GI/: Was retaining urine - now has coude with bag attached. Adequate urine output. BM X0  Diet/appetite: Tolerating reg diet. Eating well.  Activity:  Assist of 2, w walker and GB up to chair and in halls.  Pain: At acceptable level on current regimen.   Skin: No new deficits noted.  LDA's: 2PIV SL    Plan: Continue with POC. Notify primary team with changes.   See previous note for information on UC insertion.    Goal Outcome Evaluation:      Plan of Care Reviewed With: patient    Overall Patient Progress: decliningOverall Patient Progress: declining    Outcome Evaluation: Became disoriented to place, time, and situation overnight. Urology placed a servin for retention.

## 2025-04-12 NOTE — PROGRESS NOTES
Care Management Discharge Note    Discharge Date: 04/12/2025       Discharge Disposition: Transitional Care    [facility] [address]  [address] [place]  [address]  Admissions Kelly: [phone]  Main: [phone]  F: [phone]  F: [phone] (nursing station fax)  Nurse to Nurse P: [phone]     Discharge Services: TCU services    Discharge DME: None    Discharge Transportation: [employer] wheelchair ride (ph: [phone] - Juan Manuel) scheduled for today with  window of [phone].     Private pay costs discussed: transportation costs - OOP wheelchair and stretcher rides discussed with spouse (Harmony) over the phone. Please below for more details on conversation.    Does the patient's insurance plan have a 3 day qualifying hospital stay waiver?  No    PAS Confirmation Code: [id number]  Patient/family educated on Medicare website which has current facility and service quality ratings:  Yes    Education Provided on the Discharge Plan:  Yes  Persons Notified of Discharge Plans: Pt, pt spouse, Gold 8, Bedside RN, Charge, [facility] (Saida) and [facility]  Patient/Family in Agreement with the Plan:  Yes    Handoff Referral Completed: Yes, non-[facility] PCP: External handoff communication completed    Additional Information:  STEPHANY following for discharge planning. [facility]. Pt spouse plans to provide transportation. [facility] can accept pt any time after 1300. Per STEPHANY Dee note from 4/11, Yannick horton approved (confirm # [id number]) and faxed to [facility] [address] yesterday.     STEPHANY notified by Gold 8 that pt remains medically ready and will be discharging with new servin. Pt will keep servin in until OP follow up with Urology.     STPEHANY spoke with Saida in [facility] with [facility] who advised SW call [facility] RN to ensure they can take servin. Saida confirmed that STEPHANY should send discharge orders to her through  ThangDonna Cintron will need discharge orders no later than 1400 but sooner if pt discharges earlier.     STEPHANY spoke with DIANA Lamar at [facility] RN station at [facility]. STEPHANY shared that pt would come with servin which would stay in until OP follow up with Urology. Brianda confirmed that this was fine.     STEPHANY notified by Bedside RN that there are concerns with pt transferring in and out of car with family. Bedside RN reports that pt is requiring Ax1-2 with gait belt and walkers for transfers. Per STEPHANY note from yesterday, appears plan is for pt spouse to provide transport today.     STEPHANY spoke with pt spouse (Harmony) over the phone to discuss transportation. Harmony confirms that she was planning to provide transport to pt today and be at hospital around 1300. SW shared team concerns that pt would not be able to transfer in and out of car safely noting that it sounded like pt was requiring Ax1-2 people with gait belt and walker. Harmony reported that it would be her preference to take pt herself and noted that she was told that arranged transportation could be $5,000. SW discussed estimated amounts of wheelchair vs stretcher ride. STEPHANY shared sometimes cognition could be a factor into a patient needing a stretcher ride so an attendant could be in the back with pt if anything was needed during transport. Harmony reported that she would want pt's insurance to be billed first and SW shared that it was this SW's understanding that transport would attempt to bill pt insurance first but if this is denied than pt would receive OOP bill. SW also shared that it was this SW's understanding that Medicare typically does NOT cover discharge transportation. Harmony reported that cognitively pt was doing fine and should not need a stretcher ride. Harmony reported that she spoke with pt this morning and he sounded very alert and oriented. STEPHANY agreed to discuss transport rec with team again and noted that SW may tentatively schedule transport as this can get booked out  and SW did not want pt to miss opportunity to discharge to TCU today if able. SW shared this ride could be cancelled if team feels pt is safely able to transport in and out of a car.     STEPHANY spoke with Bedside RN about cognition. Bedside RN reports that pt should do fine with wheelchair transport and pt is generally Aox4 with some confusion. Agreed that pt does better with spouse. Noted that they would attempt to get PT to weigh in.     SW tentatively scheduled w/c ride through [employer] (ph: [phone] - Juan Manuel) with  window of [phone] for today.     STEPHANY spoke with Harmony over the phone to keep her updated. SW shared that per discussion with Bedside RN that wheelchair ride should be fine for pt transport. SW shared that Bedside RN was going to try and get PT to weigh in on safety of pt transferring in and out of a car. STEPHANY also shared tentative w/c ride time for this afternoon, noting that SW could cancel this if team thinks it is safe for Harmony to provide transport. Harmony reports that she would prefer to provide transport and reiterated that pt would do better transporting with her. Harmony sounded agreeable to w/c transport if being recommended too. STEPHANY agreed that if it is safe for pt to transport with her than this would be first preference.     STEPHANY spoke with [facility]ia[facility] (Saida) who confirmed that they could still accept pt today if ride time is not until [phone]. Saida reports that they will need discharge orders by 1300 - 1400 in order to admit pt today.     STEPHANY notified by Bedside RN that w/c transportation highly recommended. STEPHANY spoke with Harmony over the phone and notified her of the recommendation that pt take w/c transport. STEPHANY asked if it would be ok for pt to take scheduled w/c transport this afternoon around 0625-2785. Harmony replied that she would prefer to take pt herself but if it was recommended as the safest option that she would be ok with w/c transport.  Harmony reported that she would not plan to be at hospital until later this afternoon and plans to bring pt a change in clothes before he discharges. Harmony requested that someone assist pt with ordering lunch and at least a snack before he discharges later this afternoon. STEPHANY notified Bedside RN of Harmony request. Harmony had no other questions for SW at this time. STEPHANY thanked Harmony for her flexibility on change in discharge transportation and Harmony reported that if this was recommended for transport that it was fine.     STEPHANY sent discharge orders via Epic to [facility] at 1335. STEPHANY messaged [facility] via Epic to let them know scheduled ride time for pt to discharge from hospital.      Tianna West, [facility] HospitalSTEPHANY  Float   [facility]   Available via [employer]    Social Work and Care Management Department       SEARCHABLE in [employer] - search SOCIAL WORK       Newborn (0800 - 1630) Saturday and Sunday     Units: 4A Vocera, 4C Vocera, & 4E Vocera        Units: 5A 5585-8933 Vocera, 5A 8474-8468 Vocera , BMT SW 1 BMT SW 2, BMT SW 3 & BMT SW 4  5C Off Service 5401 - 5416  5C Off Service [phone]     Units: 6A Vocera & 6B Vocera      Units: 6C Vocera     Units: 7A Vocera & 7B Vocera      Units: 7C Med Surg 7401 thru 7418 and 7C Med Surg 7502 thru 7521      Unit: Newborn ED Vocera & Newborn Obs Vocera     [facility] (8978-6151) Saturday and Sunday      Units: 5 Ortho Vocera, 5 Med Surg Vocera & WB ED Vocera     Units: 6 Med Surg Vocera, 8 Med Surg Vocera, & 10 ICU Vocera      After hours Vocera [facility] and After Hours Vocera Newborn     Please NOTE changes to times below:    **Saturday & Sunday (1630 - 2030)    **Mon-Fri (0045-2600)     **FV Recognized Holidays  (2079-3543)    Units: ALL   - see above VOCERA links to units

## 2025-04-12 NOTE — CONSULTS
Urologic Surgery Consultation Note  [facility]    Fco Arroyo MRN# [id number]   Age: 75 year old YOB: 1950     Date of Admission:  4/5/2025    Reason for consult: Urinary retention       Requesting physician: Mike Dominguez MD       Level of consult: Consult, follow and place orders           Assessment:   Fco Arroyo is a 75 year old male with a PMH of T2DM, HTN, and dementia and a urologic hx of unspecified urethral stricture s/p urethral dilation and OAB admitted for management of epitaxis following recent fall on 3/31 resulting in nasal bone fractures. Urology consulted for urinary retention following failed placement by experienced RNs x 3.    Bladder scanned for > 800 mL.    Procedure:  Patient was prepped and draped in usual sterile fashion.  Uro-Jet was inserted.  18 French coudé catheter was placed. Of note, there was an initial area of resistance which was felt to be around the bulbar urethra.  However with some pressure, the catheter was able to be advanced into the urinary bladder with clear yellow urine on return. 1200 mL of urine.         Recommendations:   - Avoid anticholinergic, antihistamine, and alpha-agonist medications as these will exacerbate urinary retention  - Minimize narcotic pain medication regimen as tolerated  - Send UA/UC to rule out UTI as etiology of urinary retention  - Start tamsulosin 0.4 mg qday (consider risks of hypotension given fall hx)  - Maintain indwelling servin catheter with plan for trial of void when nearing discharge. If TOV failed, please reach out to urology for replacement of servin catheter and scheduling of outpatient TOV.  - Urology will sign off. Please reach out to on-call resident/PA with any questions or concerns          History of Present Illness:   CC: Urinary retention     History is obtained from the patient and the patient's chart.    Fco Arroyo is a 75 year old male with a PMH of T2DM, HTN, and  dementia and a urologic hx of unspecified urethral stricture s/p urethral dilation and OAB admitted for management of epitaxies following recent fall on 3/31 resulting in nasal bone fractures. Urology consulted for urinary retention.    Bladder scanned for > 800 mL.    Previously seen by [facility] urologist for urinary retention. Servin was unable to be placed at bedside, so he was taken to OR for complicated servin placement, where an unspecified urethral stricture that was seen and dilated with goodwins. On follow up his AUA symptom score has been low (4). However, at bedside today patient endorses worsening LUTs, suggestive of stricture recurrence.    Denies hematuria, suprapubic pain, dysuria.          Past Medical History:     Past Medical History:   Diagnosis Date    DIABETES MELLITUS TYPE II-UNCOMPL 8/17/2005    Essential hypertension, benign     on meds, Stress Echo 1991 - normal, but suboptimal exertion    Other and unspecified hyperlipidemia     Other isolated or specific phobias     atenolol helps, wants open MRI    Other specified causes of urethral stricture     cystoscopy 1997 some hematuria in past - benign    Squamous cell carcinoma              Past Surgical History:     Past Surgical History:   Procedure Laterality Date    SURGICAL HISTORY OF -   1998    open cholecystectomy for gallstones s/p ERCP    SURGICAL HISTORY OF -   1997    cystoscopy    SURGICAL HISTORY OF -   11/1997    Urethral stricture             Social History:     Social History     Socioeconomic History    Marital status:      Spouse name: Not on file    Number of children: Not on file    Years of education: Not on file    Highest education level: Not on file   Occupational History    Not on file   Tobacco Use    Smoking status: Former     Types: Cigarettes    Smokeless tobacco: Never   Substance and Sexual Activity    Alcohol use: Yes     Comment: rare    Drug use: No    Sexual activity: Not Currently   Other Topics Concern     Parent/sibling w/ CABG, MI or angioplasty before 65F 55M? Yes   Social History Narrative    Not on file     Social Drivers of Health     Financial Resource Strain: Unknown (4/9/2025)    Financial Resource Strain     Within the past 12 months, have you or your family members you live with been unable to get utilities (heat, electricity) when it was really needed?: Patient unable to answer   Food Insecurity: Unknown (4/9/2025)    Food Insecurity     Within the past 12 months, did you worry that your food would run out before you got money to buy more?: Patient unable to answer     Within the past 12 months, did the food you bought just not last and you didn t have money to get more?: Patient unable to answer   Transportation Needs: Unknown (4/9/2025)    Transportation Needs     Within the past 12 months, has lack of transportation kept you from medical appointments, getting your medicines, non-medical meetings or appointments, work, or from getting things that you need?: Patient unable to answer   Physical Activity: Not on file   Stress: Not on file   Social Connections: Socially Integrated (11/30/2024)    [facility]    Social Connections     Do you often feel lonely or isolated from those around you?: 0   Interpersonal Safety: Unknown (4/9/2025)    Interpersonal Safety     Do you feel physically and emotionally safe where you currently live?: Patient unable to answer     Within the past 12 months, have you been hit, slapped, kicked or otherwise physically hurt by someone?: Patient unable to answer     Within the past 12 months, have you been humiliated or emotionally abused in other ways by your partner or ex-partner?: Patient unable to answer   Housing Stability: Unknown (4/9/2025)    Housing Stability     Do you have housing? : Patient unable to answer     Are you worried about losing your housing?: Patient unable to answer             Family History:     Family  History   Problem Relation Age of Onset    Diabetes Mother     Hypertension Mother     Hypertension Father     Heart Disease Brother         MI x2, CABG, 1st MI at 49-51 yo.     Hypertension Brother     C.A.D. Brother     Alzheimer Disease Brother     Diabetes Sister          maybe    Coronary Artery Disease Brother     Heart Disease Brother     Melanoma No family hx of              Allergies:      Allergies   Allergen Reactions    Lisinopril Cough and Other (See Comments)    Losartan Cough and Other (See Comments)    Morphine GI Disturbance    Quetiapine Other (See Comments)     Other Reaction(s): Behavioral Disturbances      Patient got more agitated after taking seroquel at [facility].    Patient got more agitated after taking seroquel at [facility].    Terbinafine Other (See Comments)     hematuria             Medications:     Current Facility-Administered Medications   Medication Dose Route Frequency Provider Last Rate Last Admin    acetaminophen (TYLENOL) tablet 650 mg  650 mg Oral Q4H PRN Yudi Jones PA-C   650 mg at 04/11/25 1430    Or    acetaminophen (TYLENOL) Suppository 650 mg  650 mg Rectal Q4H PRN Yudi Jones PA-C        artificial saliva (BIOTENE MT) solution 1 spray  1 spray Swish & Spit 4x Daily PRN Anil Jason MD   1 spray at 04/07/25 0701    busPIRone (BUSPAR) tablet 5 mg  5 mg Oral BID Mike Dominguez MD   5 mg at 04/11/25 2112    calcium carbonate (TUMS) chewable tablet 1,000 mg  1,000 mg Oral 4x Daily PRN Yudi Jones PA-C        carbidopa-levodopa (SINEMET CR)  MG per CR tablet 1 tablet  1 tablet Oral TID Yudi Jones PA-C   1 tablet at 04/11/25 2112    carvedilol (COREG) tablet 3.125 mg  3.125 mg Oral BID w/meals Yudi Jones PA-C   3.125 mg at 04/11/25 1722    glucose gel 15-30 g  15-30 g Oral Q15 Min PRN Yudi Jones PA-C        Or    dextrose 50 % injection 25-50 mL  25-50 mL Intravenous Q15 Min PRN Yudi Jones PA-C        Or    glucagon  injection 1 mg  1 mg Subcutaneous Q15 Min PRN Yudi Jones PA-C        guaiFENesin (ROBITUSSIN) 20 mg/mL solution 200 mg  200 mg Oral Q4H PRN Anil Jason MD   200 mg at 04/11/25 2112    insulin aspart (NovoLOG) injection (RAPID ACTING)  1-7 Units Subcutaneous TID AC Piotr Hernandez PA-C   2 Units at 04/11/25 1722    insulin aspart (NovoLOG) injection (RAPID ACTING)  1-5 Units Subcutaneous At Bedtime Piotr Hernandez PA-C        lidocaine (LMX4) cream   Topical Q1H PRN Yudi Jones PA-C        lidocaine 1 % 0.1-1 mL  0.1-1 mL Other Q1H PRN Yudi Jones PA-C        melatonin tablet 1 mg  1 mg Oral At Bedtime PRN Yudi Jones PA-C        mirabegron (MYRBETRIQ) 24 hr tablet 50 mg  50 mg Oral Daily Yudi Jones PA-C   50 mg at 04/11/25 0849    nystatin (MYCOSTATIN) cream   Topical BID Yudi Jones PA-C   Given at 04/11/25 2112    ondansetron (ZOFRAN ODT) ODT tab 4 mg  4 mg Oral Q6H PRN Yudi Jones PA-C        Or    ondansetron (ZOFRAN) injection 4 mg  4 mg Intravenous Q6H PRN Ydui Jones PA-C        risperiDONE (risperDAL) tablet 0.25 mg  0.25 mg Oral BID Mike Dominguez MD   0.25 mg at 04/11/25 2113    rosuvastatin (CRESTOR) tablet 20 mg  20 mg Oral Daily Yudi Jones PA-C   20 mg at 04/11/25 0849    senna-docusate (SENOKOT-S/PERICOLACE) 8.6-50 MG per tablet 1 tablet  1 tablet Oral BID PRN Yudi Jones PA-C        Or    senna-docusate (SENOKOT-S/PERICOLACE) 8.6-50 MG per tablet 2 tablet  2 tablet Oral BID PRN Yudi Jones PA-C        sodium chloride (OCEAN) 0.65 % nasal spray 2 spray  2 spray Both Nostrils Q4H Carlene Young PA-C   2 spray at 04/12/25 0019    sodium chloride (PF) 0.9% PF flush 3 mL  3 mL Intracatheter Q8H BERONICA Yudi Jones PA-C   3 mL at 04/12/25 0019    sodium chloride (PF) 0.9% PF flush 3 mL  3 mL Intracatheter q1 min prn Yudi Jones PA-C        tamsulosin (FLOMAX) capsule 0.4 mg  0.4 mg Oral Daily Yudi Jones PA-C   0.4 mg at 04/11/25 0849    white  petrolatum GEL   [facility]   Given at 04/11/25 2113             Review of Systems:      All other review of systems negative, except for what is mentioned above        Physical Exam:   BP (!) 144/88 (BP Location: Right arm)   Pulse 84   Temp 98  F (36.7  C) (Temporal)   Resp 20   Wt 91.3 kg (201 lb 4.5 oz)   SpO2 100%   BMI 32.49 kg/m    GEN: AAO*3, not in acute distress, lying comfortably  HEENT: atraumatic, mucosa moist  : normal male external genitalia, servin in place, uncircumcised   EXT: Extremities atraumatic, grossly normal range of motion  NEURO/PSYCH: CN grossly normal, no focal weakness, normal mood and thought process              Data:     Recent Labs   Lab Test 04/11/25  0517 04/10/25  0550 04/09/25  0519   WBC 8.9 9.3 8.9   HGB 11.6* 11.2* 11.5*   CR 0.85 0.89 0.85         No results found for this or any previous visit from the past 365 days.         Will discuss with staff surgeon Dr. Heart, who agrees with the assessment and plans    Ama Sparrow MD, MPH  Urology Resident, PGY-2    Discussed with residents and agree.  Benjie Heart MD

## 2025-04-12 NOTE — DISCHARGE SUMMARY
"[facility]  Hospitalist Discharge Summary      Date of Admission:  4/5/2025  Date of Discharge:  4/12/2025  Discharging Provider: Mike Dominguez MD  Discharge Service: Hospitalist Service, GOLD TEAM 8    Chief Complaint/Reason for admission:  Epistaxis due to trauma       Present on Admission:   (Resolved) Personal history of fall   Benign essential hypertension   Closed fracture of nasal bone, sequela   History of dementia   (Resolved) Epistaxis due to trauma   Parkinson's disease, unspecified whether dyskinesia present, unspecified whether manifestations fluctuate (H)   Benign prostatic hyperplasia with urinary obstruction   Severe dementia with anxiety (H)      Discharge Diagnoses  Active Problems:    Benign essential hypertension    Closed fracture of nasal bone, sequela    History of dementia    Parkinson's disease, unspecified whether dyskinesia present, unspecified whether manifestations fluctuate (H)    Benign prostatic hyperplasia with urinary obstruction    Severe dementia with anxiety (H)        Clinically Significant Risk Factors     # Obesity: Estimated body mass index is 32.67 kg/m  as calculated from the following:    Height as of an earlier encounter on 4/5/25: 1.676 m (5' 6\").    Weight as of this encounter: 91.8 kg (202 lb 6.1 oz).       Follow-ups Needed After Discharge   Follow-up Appointments       Follow Up and recommended labs and tests      Follow up with Nursing home physician.  No follow up labs or test are needed.    Please follow up with Urology in              Future Appointments 4/12/2025 - 10/9/2025        Date Visit Type Length Department Provider     4/14/2025  9:00 AM IP OT TREATMENT 30 min UU OT Janes Echeverria, OT    Location Instructions:     The [facility] is located on the corner of [facility] and [facility] on the [facility] [place]. It is easily accessible from virtually any point in the [facility] area, via [address].              4/14/2025 10:00 AM IP PT TREATMENT 30 min UU PT Denice Childs, PT     4/14/2025 10:00 AM IP PT TREATMENT 30 min UU PT TECH 2    Location Instructions:     The [facility] is located on the corner of [facility] on the [facility]. It is easily accessible from virtually any point in the [facility] area, via [address].                       Unresulted Labs Ordered in the Past 30 Days of this Admission       No orders found from 3/6/2025 to 4/6/2025.        These results will be followed up by     Discharge Disposition   Discharged to short-term care facility  Condition at discharge: Stable    Hospital Course   Fco Arroyo is a 75 year old male admitted on 4/5/2025. He has PMH of HTN, T2DM, anxiety, and dementia who had recent fall on 3/31 resulting in nasal bone fractures. He has since had issues with epistaxis for which he has been to [facility] ED multiple times. He was most recently sent home with BL Rhinorockets in place though continued to have bleeding through these prompting ED visit today. However, during evaluations, patient became increasingly distressed and agitated requiring Code 21 call. Patient will be admitted for further management given risk of severe bleed if he was to pull the rhinorockets at home.       Epistaxis  Nasal bone fractures 2/2 GLF 3/31  Presents with mechanical fall on 3/31, was seen at ED that night and found to have acute fractures of the nasal bones and bony nasal septum. He was able to be discharged home, though he represented to [facility] ED on 4/4 due to epistaxis. Repeat imaging was stable. It was noted on exam, bony septum was visible and concerning for open and displaced fracture. ENT recommended Augmentin at that time which was started and he was discharged home. He presented again early this morning to [facility] ED  for ongoing bleeding, and decision was made to place bilateral 5.5cm Rhino Rocket. Unfortunately, he continued to bleed around the packing and had blood coming out of his eyes. Decision was made to transfer to [facility] for possible IR embolization. Upon presentation to [facility] ED, he had slight dripping of blood. He was seen by ENT however, no exam/intervention could be performed as patient became acutely agitated, requiring 0.5mg Ativan IV, Haldol 5mg IV, Code 21, restraints, and eventually Precedex drip for patient's safety and for ENT eval and rest as below  ENT recs below:  - Posisep in place in the right nasal passage - this is dissolvable and does not need to be removed.   - Surgiflo in place in the left nasal passage- this is dissolvable and does not need to be removed.  - Ok to discontinue antibiotics as non absorbable nasal packing has been taken out.  - Start nasal saline spray to b/l nasal passages Q4H   - avoid nose blowing, sneeze and cough with mouth open  - Vaseline or water based lubricant to anterior nares at night  - If patient experiences brisk rebleeding, spray Afrin and hold digital pressure on bilateral nostrils for 20 minutes. If bleeding continues after 20 minutes of digital pressure, repeat the above steps. If this fails to control the bleeding at that time, call ENT for further recommendations.   - okay to discharge    Acute Agitation  Anxiety  Dementia/Parkinson's disease  Family reports that patient is baseline A&Ox4, usually not aggressive.   -- started on Precedex gtt. Consulted Psy. Added Buspar and risperdal and stopped precedex 4/9/25  -- Continue PTA Sinemet 25/100 TID  - did well with improved agiations       BPH  Acute urinary retention  -- At presentation, continue PTA Flomax, PTA Myrbetrig  - developed acute urinary retention on 4/11/25 and needed Urology for indwelling urinary bladder catheter placement and recommended outpatient follow up    Left eyebrow laceration s/p  repair  Secondary to recent fall. Was sutured in the ED on 3/31.  -- sutures removed on 4/11/25     HTN  -- Continued PTA coreg 3.125 BID  -- HOLD PTA ASA given acute bleeding and likely need for nasal packings for several days  -- HOLD PTA Lasix in setting of acute bleeding and decreased PO intake  -- Continued PTA Statin     T2DM  -- HOLD PTA Metformin and Glipizide  -- MSSI  -- ACHS gluc checks    To note, lives at home with wife.     Consultations This Hospital Stay   ENT IP CONSULT  ENT IP CONSULT  WOUND OSTOMY CONTINENCE NURSE  IP CONSULT  PSYCHIATRY IP CONSULT  NURSING TO CONSULT FOR VASCULAR ACCESS CARE IP CONSULT  NURSING TO CONSULT FOR VASCULAR ACCESS CARE IP CONSULT  PHYSICAL THERAPY ADULT IP CONSULT  OCCUPATIONAL THERAPY ADULT IP CONSULT  OCCUPATIONAL THERAPY ADULT IP CONSULT  CARE MANAGEMENT / SOCIAL WORK IP CONSULT  UROLOGY IP CONSULT    Code Status   Full Code    Time Spent on this Encounter   IMike MD, personally saw the patient today and spent less than or equal to 30 minutes discharging this patient.       Mike Dominguez MD  [facility] [address] [address]  Phone: [phone]  ______________________________________________________________________    Physical Exam   Vital Signs: Temp: 98.4  F (36.9  C) Temp src: Oral BP: 139/66 Pulse: 83   Resp: 20 SpO2: 97 % O2 Device: None (Room air)    Weight: 202 lbs 6.12 oz  Constitutional:       General: He is not in acute distress.     Appearance: He is not diaphoretic.   HENT:      Head:      Comments: Significant facial bruising, more so over left upper face and nose. No active bleeding  Cardiovascular:      Rate and Rhythm: Normal rate and regular rhythm.      Heart sounds: No murmur heard.  Pulmonary:      Effort: Pulmonary effort is normal.      Breath sounds: No wheezing, rhonchi or rales.   Skin:     General: Skin is warm and dry.   Neurological:      Mental Status: calm, cooperative           Primary Care Physician   Sue Melchor    Discharge Orders      Adult Urology [facility] Referral      General info for SNF    Length of Stay Estimate: Long Term Care  Condition at Discharge: Stable  Level of care:skilled   Rehabilitation Potential: Fair  Admission H&P remains valid and up-to-date: Yes  Recent Chemotherapy: N/A  Use Nursing Home Standing Orders: Yes     Follow Up and recommended labs and tests    Follow up with Nursing home physician.  No follow up labs or test are needed.    Please follow up with Urology in     Tubes and Drains    Current Tubes and Drains:     Drain  Duration           Urinary Drain Urethral Catheter Coude 18 fr -- days         To straight gravity drainage. Change catheter every 2 weeks and PRN for leaking or decreased urine output with signs of bladder distention. DO NOT change catheter without a specific Provider order IF diagnosis of benign prostatic hypertrophy (BPH), neurogenic bladder, or other urological conditions      Reason for your hospital stay    You were admitted for nasal bone fracture and developed urinary retension     Activity - Up ad eldon     Fall precautions     Diet    Follow this diet upon discharge: Current Diet:Orders Placed This Encounter      Snacks/Supplements Adult: Ensure Enlive; With Meals      Snacks/Supplements Adult: Glucerna; With Meals      Snacks/Supplements Adult: Magic Cup; With Meals      Advance Diet as Tolerated: Regular Diet Adult; Regular Diet Adult       Significant Results and Procedures   Most Recent 3 CBC's:  Recent Labs   Lab Test 04/12/25  0543 04/11/25  0517 04/10/25  0550   WBC 10.2 8.9 9.3   HGB 11.7* 11.6* 11.2*   MCV 95 94 89    202 191     Most Recent 3 BMP's:  Recent Labs   Lab Test 04/12/25  1201 04/12/25  0806 04/12/25  0543 04/11/25  0837 04/11/25  0517 04/10/25  1007 04/10/25  0550   NA  --   --  141  --  143  --  140   POTASSIUM  --   --  3.7  --  3.5  --  3.6   CHLORIDE  --   --  107  --  107  --  106   CO2  --    --  24  --  24  --  24   BUN  --   --  16.8  --  17.2  --  17.9   CR  --   --  0.82  --  0.85  --  0.89   ANIONGAP  --   --  10  --  12  --  10   RABIA  --   --  9.1  --  9.2  --  8.8   * 130* 134*   < > 113*   < > 92    < > = values in this interval not displayed.     Most Recent 2 LFT's:  Recent Labs   Lab Test 04/12/25  0543 04/11/25  0517   AST 27 30   ALT 13 14   ALKPHOS 77 79   BILITOTAL 0.3 0.5     Most Recent 3 INR's:  Recent Labs   Lab Test 04/05/25  0738   INR 1.14     Most Recent INR's and Anticoagulation Dosing History:  Anticoagulation Dose History          Latest Ref Rng & Units 4/5/2025   Recent Dosing and Labs   INR 0.85 - 1.15 1.14      Most Recent 3 Troponin's:No lab results found.  Most Recent 3 BNP's:No lab results found.  Most Recent D-dimer:No lab results found.  Most Recent TSH and T4:  Recent Labs   Lab Test 10/21/20  1542   TSH 0.80     Most Recent Hemoglobin A1c:  Recent Labs   Lab Test 10/21/20  1542   A1C 6.6*     Most Recent Urinalysis:  Recent Labs   Lab Test 10/04/20  0601   COLOR Yellow   APPEARANCE Clear   URINEGLC Negative   URINEBILI Negative   URINEKETONE Negative   SG 1.021   UBLD Small*   URINEPH 5.0   PROTEIN Negative   NITRITE Negative   LEUKEST Negative   RBCU 38*   WBCU 6*     Most Recent ABG:No lab results found.  Most Recent ESR & CRP:  Recent Labs   Lab Test 06/05/20  1133   SED 9     Most Recent Anemia Panel:  Recent Labs   Lab Test 04/12/25  0543   WBC 10.2   HGB 11.7*   HCT 36.1*   MCV 95      ,   Results for orders placed or performed during the hospital encounter of 04/04/25   CT Facial Bones without Contrast    Narrative    EXAM: CT FACIAL BONES WITHOUT CONTRAST  LOCATION: [facility]  DATE: 4/4/2025    INDICATION: nasal bone fractures, acute nose bleeding,  COMPARISON: None.  TECHNIQUE: Routine CT Maxillofacial without IV contrast. Multiplanar reformats. Dose reduction techniques were used.     FINDINGS:    Acute appearing  and mildly displaced nasal bone fractures with a small amount of presumable hemorrhage within the nasal cavity. Additionally, there are displaced acute appearing fractures of the nasal septum. The zygomatic arches are maintained. There   are no acute fractures of the orbits however there is likely a age indeterminate but chronic appearing fracture of the medial left orbital wall.    Prominent periapical lucencies regarding the central and lateral incisors of the mandible on the right. Additional prominent periapical lucency regarding the right mandibular premolar.         Impression    IMPRESSION:   1.  Acute appearing and mildly displaced nasal bone nasal fractures.  2.  Chronic appearing deformity of the medial left orbital wall.  3.  Multiple periapical lucencies of the maxillary incisors and right premolar are nonspecific but are favored to represent periapical lucencies, and possible periapical abscesses, rather than acute trauma.         Discharge Medications   Current Discharge Medication List        CONTINUE these medications which have NOT CHANGED    Details   ASPIRIN 81 MG OR TABS Take 1 tablet by mouth daily.      busPIRone (BUSPAR) 5 MG tablet TAKE 1 TABLET BY MOUTH EVERY 8 HOURS AS NEEDED FOR ANXIETY  Qty: 30 tablet, Refills: 3    Associated Diagnoses: Generalized anxiety disorder      calcipotriene (DOVONEX) 0.005 % external cream Mix with efudex and apply twice daily to scalp for 12 days  Qty: 60 g, Refills: 3    Associated Diagnoses: AK (actinic keratosis); Seborrheic keratosis; Inflamed seborrheic keratosis; Angioma of skin; Dermal nevus; Lentigo; History of skin cancer      carbidopa-levodopa (SINEMET)  MG tablet Take 1 tablet by mouth 3 times daily.      carvedilol (COREG) 3.125 MG tablet Take 3.125 mg by mouth 2 times daily (with meals).      CINNAMON PO Take 1,000 mg by mouth daily. Take one 1000 mg capsule by mouth daily      cyanocobalamin (VITAMIN B-12) 500 MCG tablet Take 1 tablet by  mouth daily.      fluorouracil (EFUDEX) 5 % external cream Mix with dovonex and apply twice daily to scalp for 12 days  Qty: 40 g, Refills: 1    Associated Diagnoses: AK (actinic keratosis); Seborrheic keratosis; Inflamed seborrheic keratosis; Angioma of skin; Dermal nevus; Lentigo; History of skin cancer      fluticasone (FLONASE) 50 MCG/ACT nasal spray Spray 2 sprays into both nostrils daily  Qty: 47.4 mL, Refills: 3    Associated Diagnoses: Nasal congestion; Chronic rhinitis; Deviated nasal septum; Hypertrophy of both inferior nasal turbinates      metFORMIN (GLUCOPHAGE) 1000 MG tablet TAKE 1 TABLET BY MOUTH TWICE DAILY WITH MEALS  Qty: 60 tablet, Refills: 1    Associated Diagnoses: Type 2 diabetes mellitus without complication, without long-term current use of insulin (H)      mirabegron (MYRBETRIQ) 50 MG 24 hr tablet Take 1 tablet by mouth daily.      Misc Natural Products (TOTAL MEMORY & FOCUS FORMULA) TABS Take 2 Gum by mouth daily.      rosuvastatin (CRESTOR) 20 MG tablet Take 1 tablet by mouth once daily  Qty: 90 tablet, Refills: 1    Associated Diagnoses: Central retinal vein occlusion of right eye, unspecified complication status (H)      tamsulosin (FLOMAX) 0.4 MG capsule Take 1 capsule (0.4 mg) by mouth daily  Qty: 90 capsule, Refills: 3    Associated Diagnoses: Urinary retention      VITAMIN C 500 MG PO TABS Take 1 tablet by mouth daily.      VITAMIN E Take 400 Units by mouth daily.      !! blood glucose (ACCU-CHEK SMARTVIEW) test strip Use to test blood sugar 2 times daily or as directed.  Qty: 200 each, Refills: 1    Associated Diagnoses: Type 2 diabetes mellitus without complication (H)      !! blood glucose (ACCU-CHEK SMARTVIEW) test strip Use to test blood sugar 1 times daily or as directed.  Qty: 50 strip, Refills: 12    Associated Diagnoses: Type 2 diabetes, HbA1C goal < 8% (H)      blood glucose monitoring (ACCU-CHEK FASTCLIX) lancets 1 each 2 times daily Use to test blood sugar 2 times daily  or as directed.  Qty: 200 each, Refills: 1    Associated Diagnoses: Type 2 diabetes, HbA1C goal < 8% (H)       !! - Potential duplicate medications found. Please discuss with provider.        STOP taking these medications       amoxicillin-clavulanate (AUGMENTIN) 875-125 MG tablet Comments:   Reason for Stopping:             Allergies   Allergies   Allergen Reactions    Lisinopril Cough and Other (See Comments)    Losartan Cough and Other (See Comments)    Morphine GI Disturbance    Quetiapine Other (See Comments)     Other Reaction(s): Behavioral Disturbances      Patient got more agitated after taking seroquel at [facility].    Patient got more agitated after taking seroquel at [facility].    Terbinafine Other (See Comments)     hematuria

## 2025-04-12 NOTE — PROGRESS NOTES
Bladder scanned patient for post-void residual. >799 resulted multiple times. Patient declined straight cath, asked for a second opinion. Contacted provider, provider went to bedside and explained the procedure and the risks of not draining the bladder, patient still declined due to his wife needing to be involved in decision making. Contacted wife, she explained that patient had been to an OSH in December due to urinary retention, staff there were unable to place a catheter due to enlarged prostate and had caused a lot of bleeding,the catheter had to be placed by urology under anesthesia. Wife agreed for us to attempt and she talked to patient on the phone, he became agreeable. 3 nurses attempted to straight cath patient with a coude catheter, no success - not able to pass prostate. Patient did not bleed. Notified provider who placed urology consult.

## 2025-04-13 ENCOUNTER — DOCUMENTATION ONLY (OUTPATIENT)
Dept: GERIATRICS | Facility: CLINIC | Age: 75
End: 2025-04-13
Payer: COMMERCIAL

## 2025-04-13 ENCOUNTER — LAB REQUISITION (OUTPATIENT)
Dept: LAB | Facility: CLINIC | Age: 75
End: 2025-04-13

## 2025-04-13 DIAGNOSIS — R76.12 NONSPECIFIC REACTION TO CELL MEDIATED IMMUNITY MEASUREMENT OF GAMMA INTERFERON ANTIGEN RESPONSE WITHOUT ACTIVE TUBERCULOSIS: ICD-10-CM

## 2025-04-13 PROBLEM — Z85.828 HISTORY OF SKIN CANCER: Status: ACTIVE | Noted: 2022-07-21

## 2025-04-13 PROBLEM — G20.A1 PARKINSON'S DISEASE WITHOUT DYSKINESIA OR FLUCTUATING MANIFESTATIONS (H): Status: ACTIVE | Noted: 2024-06-12

## 2025-04-13 PROBLEM — R33.8 ACUTE URINARY RETENTION: Status: ACTIVE | Noted: 2024-11-30

## 2025-04-13 PROBLEM — M25.561 CHRONIC PAIN OF RIGHT KNEE: Status: ACTIVE | Noted: 2022-07-21

## 2025-04-13 PROBLEM — N40.1 BENIGN PROSTATIC HYPERPLASIA WITH URINARY OBSTRUCTION: Status: ACTIVE | Noted: 2022-02-24

## 2025-04-13 PROBLEM — R33.9 RETENTION OF URINE: Status: ACTIVE | Noted: 2024-12-17

## 2025-04-13 PROBLEM — F05 POSTOPERATIVE DELIRIUM: Status: ACTIVE | Noted: 2024-11-30

## 2025-04-13 PROBLEM — R53.83 FATIGUE: Status: ACTIVE | Noted: 2022-02-24

## 2025-04-13 PROBLEM — N13.5 URETERAL STRICTURE: Status: ACTIVE | Noted: 2024-11-30

## 2025-04-13 PROBLEM — R35.0 URINARY FREQUENCY: Status: ACTIVE | Noted: 2022-02-24

## 2025-04-13 PROBLEM — J44.9 COPD MIXED TYPE (H): Status: ACTIVE | Noted: 2022-07-21

## 2025-04-13 PROBLEM — R25.1 TREMOR: Status: ACTIVE | Noted: 2023-08-04

## 2025-04-13 PROBLEM — F41.9 ANXIETY: Status: ACTIVE | Noted: 2022-02-24

## 2025-04-13 PROBLEM — R09.81 CONGESTION OF NASAL SINUS: Status: ACTIVE | Noted: 2022-07-21

## 2025-04-13 PROBLEM — I10 HTN (HYPERTENSION): Status: ACTIVE | Noted: 2022-02-24

## 2025-04-13 PROBLEM — N13.8 BENIGN PROSTATIC HYPERPLASIA WITH URINARY OBSTRUCTION: Status: ACTIVE | Noted: 2022-02-24

## 2025-04-13 PROBLEM — G89.29 CHRONIC PAIN OF RIGHT KNEE: Status: ACTIVE | Noted: 2022-07-21

## 2025-04-13 NOTE — PROGRESS NOTES
[facility] GERIATRICS  INITIAL VISIT NOTE      PRIMARY CARE PROVIDER AND CLINIC: Sue Melchor [address] / [facility] [address]    [facility] Medical Record Number: [id number]  [facility] took [facility] [[id number]]    Chief Complaint   Patient presents with    Hospital F/U     [facility] 4/5/2025 - 4/12/2025     HPI:    Fco Arroyo is a 75 year old (1950) male was admitted to the above facility from [facility]. Hospital stay 4/5/25 through 4/12/25 where they were admitted for epitaxis, nasal bone fractures. Now admitted to this facility for rehab, medical management, and nursing care.      History obtained from: facility chart records, facility staff, patient report, [facility] chart review, and Care Everywhere [facility] chart review.      Brief Hospital Course: PMH of HTN, DM2, anxiety, parkinson's disease and dementia who had fall on 3/31 with nasal bone fractures. Presented with ongoing Epitaxis for which he had had multiple previous ED visits. Was transferred to [facility] for possible IR embolization. He had significant agitation on arrival to [facility], requiring Precedex gtt. ENT consulted and Posisep placed to right nares, Surgiflo placed to left nares. Psych consulted and he was started on Buspar and Risperdal and weaned off Precedex gtt. Behaviors improved. Did have urine retention requiring placement of servin by urology. Lasix and ASA were held due to bleeding, but were resumed on discharge. Other chronic issues stable.  When medically stable was discharged to TCU for further rehab and medical management.     TCU Course: Seen today in room, reports he is feeling ok. Staff report had some confusion over the weekend, says he remember this, thought he was back in [place] as he went there in college. Does feels he has been thinking more clearly the past couple of days. He did not sleep well,  says hard to sleep in gokul unfamiliar place. He has chronic pain in left shoulder, says he it was injured by a horse when he was 15. Denies coughing except for when he is eating, which is not new. Does not think he has s disease but is on medications for it. Admits that he does have tremors, shuffled gait and low movements at times. Does feel constipated, wants to take something for this. Has been happy with the cares he has gotten. He lives at home with his wife.     CODE STATUS/ADVANCE DIRECTIVES: CPR/Full code     ALLERGIES:  Allergies   Allergen Reactions    Lisinopril Cough    Losartan Cough    Morphine GI Disturbance and Nausea    Quetiapine Other (See Comments)     Behavioral disturbances - Patient got more agitated after taking seroquel at [place]    Terbinafine Other (See Comments)     Hematuria       PAST MEDICAL HISTORY:   Past Medical History:   Diagnosis Date    DIABETES MELLITUS TYPE II-UNCOMPL 08/17/2005    Essential hypertension, benign     on meds, Stress Echo 1991 - normal, but suboptimal exertion    Other and unspecified hyperlipidemia     Other isolated or specific phobias     atenolol helps, wants open MRI    Other specified causes of urethral stricture     cystoscopy 1997 some hematuria in past - benign    Severe dementia with anxiety (H) 04/12/2025    Squamous cell carcinoma      PAST SURGICAL HISTORY:   Past Surgical History:   Procedure Laterality Date    SURGICAL HISTORY OF -   1998    open cholecystectomy for gallstones s/p ERCP    SURGICAL HISTORY OF -   1997    cystoscopy    SURGICAL HISTORY OF -   11/1997    Urethral stricture     FAMILY HISTORY:   Family History   Problem Relation Age of Onset    Diabetes Mother     Hypertension Mother     Hypertension Father     Heart Disease Brother         MI x2, CABG, 1st MI at 49-51 yo.     Hypertension Brother     C.A.D. Brother     Alzheimer Disease Brother     Diabetes Sister          maybe    Coronary Artery Disease Brother     Heart  Disease Brother     Melanoma No family hx of          SOCIAL HISTORY:   Patient's living condition: lives with spouse    MEDICATIONS  Post Discharge Medication Reconciliation Status: discharge medications reconciled and changed, per note/orders.  Current Outpatient Medications   Medication Sig Dispense Refill    ASPIRIN 81 MG OR TABS Take 1 tablet by mouth daily.      blood glucose (ACCU-CHEK SMARTVIEW) test strip Use to test blood sugar 2 times daily or as directed. 200 each 1    blood glucose (ACCU-CHEK SMARTVIEW) test strip Use to test blood sugar 1 times daily or as directed. 50 strip 12    blood glucose monitoring (ACCU-CHEK FASTCLIX) lancets 1 each 2 times daily Use to test blood sugar 2 times daily or as directed. 200 each 1    busPIRone (BUSPAR) 5 MG tablet Take 1 tablet (5 mg) by mouth 2 times daily.      calcipotriene (DOVONEX) 0.005 % external cream Mix with efudex and apply twice daily to scalp for 12 days (Patient taking differently: Apply topically 2 times daily as needed. Mix with efudex and apply twice daily to scalp for 12 days) 60 g 3    carbidopa-levodopa (SINEMET)  MG tablet Take 1 tablet by mouth 3 times daily.      carvedilol (COREG) 3.125 MG tablet Take 3.125 mg by mouth 2 times daily (with meals).      CINNAMON PO Take 1,000 mg by mouth daily. Take one 1000 mg capsule by mouth daily      cyanocobalamin (VITAMIN B-12) 500 MCG tablet Take 1 tablet by mouth daily.      fluorouracil (EFUDEX) 5 % external cream Mix with dovonex and apply twice daily to scalp for 12 days (Patient taking differently: Apply topically 2 times daily as needed. Mix with dovonex and apply twice daily to scalp for 12 days) 40 g 1    fluticasone (FLONASE) 50 MCG/ACT nasal spray Spray 2 sprays into both nostrils daily 47.4 mL 3    metFORMIN (GLUCOPHAGE) 1000 MG tablet TAKE 1 TABLET BY MOUTH TWICE DAILY WITH MEALS 60 tablet 1    mirabegron (MYRBETRIQ) 50 MG 24 hr tablet Take 1 tablet by mouth daily.      Misc Natural  "Products (TOTAL MEMORY & FOCUS FORMULA) TABS Take 2 Gum by mouth daily.      risperiDONE (RISPERDAL) 0.25 MG tablet Take 1 tablet (0.25 mg) by mouth 2 times daily.      rosuvastatin (CRESTOR) 20 MG tablet Take 1 tablet by mouth once daily 90 tablet 1    tamsulosin (FLOMAX) 0.4 MG capsule Take 1 capsule (0.4 mg) by mouth daily 90 capsule 3    VITAMIN C 500 MG PO TABS Take 1 tablet by mouth daily.      VITAMIN E Take 400 Units by mouth daily.       ROS:  10 point ROS neg other than the symptoms noted above in the HPI.      PHYSICAL EXAM:  BP (!) 150/74   Pulse 77   Temp 97.6  F (36.4  C)   Resp 20   Ht 1.676 m (5' 6\")   Wt 90.4 kg (199 lb 6.4 oz)   SpO2 98%   BMI 32.18 kg/m    Physical Exam  Cardiovascular:      Rate and Rhythm: Normal rate and regular rhythm.      Heart sounds: Normal heart sounds.   Pulmonary:      Effort: Pulmonary effort is normal.      Breath sounds: Normal breath sounds.   Abdominal:      General: Bowel sounds are normal.   Neurological:      Mental Status: He is alert.      Comments: tremors   Psychiatric:         Mood and Affect: Mood normal.      Comments: Memory and judgement fair          LABORATORY/IMAGING DATA:  [facility]    ASSESSMENT/PLAN:  Epistaxis  Closed fracture of nasal bone with routine healing, subsequent encounter  Secondary to fall. S/p posisep to right nares, Surgiflo to left nares, both of which will dissolve. No bleeding noted in TCU. Denies pain.   - monitor, supportive cares  - avoid blowing nose   - flonase daily     Physical deconditioning  Able to walk 100ft with therapy, transfers wt Ax1.   - PT/OT  -  to assist with discharge planning.     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 Some confusion on admission to TCU, appears to be improving. Appropriate on visit today  - " continue Risperidone BID, if remains appropriate will consider GDR  - continue Buspar BID   - monitor and adjust, supportive cares    Parkinson's disease without dyskinesia or fluctuating manifestations (H)  Follows [facility] neurology as [facility], last see 1/8/25; admit to coughing with eating  - continue Sinemet TID,   - SLP to eval at treat re : coughing with eating,   - monitor and adjust     Central retinal vein occlusion of right eye, unspecified complication status (H)  Known issue that I take into account for their medical decisions, no current exacerbations or new concerns  - continue rosuvastatin    Hyperlipidemia, unspecified hyperlipidemia type  - continue rosuvastatin     COPD mixed type (H)  Appears compensated, no concerns, Lungs clear on exam  - supportive cares  - monitor and adjust     Morbid obesity (H)  BMI 32, in the setting of HTN, HLD, and DM2,   - dietician to follow in TCU    Type 2 diabetes mellitus without complication, without long-term current use of insulin (H)  A1C 6.4%, below goal of <8% given age and co morbidities. Glipizide discontinued while IP.  -197 in TCU  - check Bg BID  - continue metformin 1000mg BID,  - monitor and adjust     Benign essential hypertension  -150, but therapy reports dropped to the 90s during their session today. He was asymptomatic. Lasix discontinued while IP.  Discussed with patient, nursing staff.   - continue Coreg BID with hold parameters,   - continue ASA    Benign prostatic hyperplasia with urinary retention  Servin catheter in place  History of urethral stricture, difficult servin placement while IP.  Discussed with patient, nursing staff.   - follow-up with urology for voiding trial, scheduled for 4/28  - continue Flomax and mirabegron ER  - encourage fluid intake      Orders:   SLP to eval and treat  Hold fluorouracil and calcipotriene in TCU  Hold coreg for SBP <110, HR <55  Schedule follow-up with urology for voiding trial  Hold  cinnamon in TCU  Encourage fluid intake  Senna-s 1 tab PO daily  APAP 650mg TID and q NOC PRN    Total time spent with patient visit at the skilled nursing facility was 45 min including patient visit and review of past records; includes time spent reviewing records from hospitalization within my organization including labs, imaging reports and provider/consult notes, review of TCU facility records, medication reconciliation, discussion of plan of care with patient, nursing staff and therapy as stated above as well as time spent on documentation.      Electronically signed by:  RONI Díaz CNP

## 2025-04-14 ENCOUNTER — PATIENT OUTREACH (OUTPATIENT)
Dept: CARE COORDINATION | Facility: CLINIC | Age: 75
End: 2025-04-14

## 2025-04-14 ENCOUNTER — TRANSITIONAL CARE UNIT VISIT (OUTPATIENT)
Dept: GERIATRICS | Facility: CLINIC | Age: 75
End: 2025-04-14
Payer: COMMERCIAL

## 2025-04-14 VITALS
TEMPERATURE: 97.6 F | RESPIRATION RATE: 20 BRPM | BODY MASS INDEX: 32.05 KG/M2 | DIASTOLIC BLOOD PRESSURE: 74 MMHG | WEIGHT: 199.4 LBS | OXYGEN SATURATION: 98 % | HEIGHT: 66 IN | SYSTOLIC BLOOD PRESSURE: 150 MMHG | HEART RATE: 77 BPM

## 2025-04-14 DIAGNOSIS — E78.5 HYPERLIPIDEMIA, UNSPECIFIED HYPERLIPIDEMIA TYPE: ICD-10-CM

## 2025-04-14 DIAGNOSIS — E66.01 MORBID OBESITY (H): ICD-10-CM

## 2025-04-14 DIAGNOSIS — Z97.8 FOLEY CATHETER IN PLACE: ICD-10-CM

## 2025-04-14 DIAGNOSIS — J44.9 COPD MIXED TYPE (H): ICD-10-CM

## 2025-04-14 DIAGNOSIS — G20.A1 PARKINSON'S DISEASE WITHOUT DYSKINESIA OR FLUCTUATING MANIFESTATIONS (H): ICD-10-CM

## 2025-04-14 DIAGNOSIS — E11.9 TYPE 2 DIABETES MELLITUS WITHOUT COMPLICATION, WITHOUT LONG-TERM CURRENT USE OF INSULIN (H): ICD-10-CM

## 2025-04-14 DIAGNOSIS — R04.0 EPISTAXIS: Primary | ICD-10-CM

## 2025-04-14 DIAGNOSIS — F41.1 GENERALIZED ANXIETY DISORDER: ICD-10-CM

## 2025-04-14 DIAGNOSIS — F02.C4: ICD-10-CM

## 2025-04-14 DIAGNOSIS — S02.2XXD CLOSED FRACTURE OF NASAL BONE WITH ROUTINE HEALING, SUBSEQUENT ENCOUNTER: ICD-10-CM

## 2025-04-14 DIAGNOSIS — N40.1 BENIGN PROSTATIC HYPERPLASIA WITH URINARY RETENTION: ICD-10-CM

## 2025-04-14 DIAGNOSIS — H34.8112 CENTRAL RETINAL VEIN OCCLUSION OF RIGHT EYE, UNSPECIFIED COMPLICATION STATUS (H): ICD-10-CM

## 2025-04-14 DIAGNOSIS — I10 BENIGN ESSENTIAL HYPERTENSION: ICD-10-CM

## 2025-04-14 DIAGNOSIS — G20.A1: ICD-10-CM

## 2025-04-14 DIAGNOSIS — R53.81 PHYSICAL DECONDITIONING: ICD-10-CM

## 2025-04-14 DIAGNOSIS — R33.8 BENIGN PROSTATIC HYPERPLASIA WITH URINARY RETENTION: ICD-10-CM

## 2025-04-14 PROCEDURE — 99310 SBSQ NF CARE HIGH MDM 45: CPT | Performed by: NURSE PRACTITIONER

## 2025-04-14 PROCEDURE — 86481 TB AG RESPONSE T-CELL SUSP: CPT | Performed by: NURSE PRACTITIONER

## 2025-04-14 PROCEDURE — 36415 COLL VENOUS BLD VENIPUNCTURE: CPT | Performed by: NURSE PRACTITIONER

## 2025-04-14 PROCEDURE — P9604 ONE-WAY ALLOW PRORATED TRIP: HCPCS | Performed by: NURSE PRACTITIONER

## 2025-04-14 RX ORDER — ACETAMINOPHEN 325 MG/1
650 TABLET ORAL 3 TIMES DAILY
COMMUNITY
Start: 2025-04-14

## 2025-04-14 NOTE — PROGRESS NOTES
Connected Care Resource Center: Connected Care Resource [place]    Background: Transitional Care Management program identified per system criteria and reviewed by [facility] team for possible outreach.    Assessment: Upon chart review, CCRC Team member will not proceed with patient outreach related to this episode of Transitional Care Management program due to reason below:    Non-[facility] TCU: CCRC team member noted patient discharged to TCU/ARU/LTACH. Patient is not established with a [facility] currently supported by Primary Care-Care Coordination therefore handoff to Primary Care-Care Coordination is not appropriate at this time.    Plan: Transitional Care Management episode addressed appropriately per reason noted above.      Magda Amezcua  Community Health Worker  [facility], [facility]  [phone]      *Connected Care Resource Team does NOT follow patient ongoing. Referrals are identified based on internal discharge reports and the outreach is to ensure patient has an understanding of their discharge instructions.

## 2025-04-14 NOTE — PLAN OF CARE
Physical Therapy Discharge Summary    Reason for therapy discharge:    Discharged to transitional care facility.    Progress towards therapy goal(s). See goals on Care Plan in [facility] electronic health record for goal details.  Goals not met.  Barriers to achieving goals:   discharge from facility.    Therapy recommendation(s):    Continued therapy is recommended.  Rationale/Recommendations:  Continue PT at TCU to continue progressing functional mobility.

## 2025-04-14 NOTE — PLAN OF CARE
Occupational Therapy Discharge Summary    Reason for therapy discharge:    Discharged to transitional care facility.    Progress towards therapy goal(s). See goals on Care Plan in [facility] electronic health record for goal details.  Goals partially met.  Barriers to achieving goals:   discharge from facility.    Therapy recommendation(s):    Continued therapy is recommended.  Rationale/Recommendations:  OT at TCU to increase ADL independence.

## 2025-04-14 NOTE — LETTER
4/14/2025      Fco Arroyo  [address]  [place] [address]        [facility] GERIATRICS  INITIAL VISIT NOTE      PRIMARY CARE PROVIDER AND CLINIC: Sue Melchor [address] [facility] [address]    [facility] Medical Record Number: [id number]  [facility] [[id number][facility]    Chief Complaint   Patient presents with     Hospital F/U     [facility] 4/5/2025 - 4/12/2025     HPI:    Fco Arroyo is a 75 year old (1950) male was admitted to the above facility from [facility]. Hospital stay 4/5/25 through 4/12/25 where they were admitted for epitaxis, nasal bone fractures. Now admitted to this facility for rehab, medical management, and nursing care.      History obtained from: facility chart records, facility staff, patient report, [facility] chart review, and Care Everywhere [facility] chart review.      Brief Hospital Course: PMH of HTN, DM2, anxiety, parkinson's disease and dementia who had fall on 3/31 with nasal bone fractures. Presented with ongoing Epitaxis for which he had had multiple previous ED visits. Was transferred to [facility] for possible IR embolization. He had significant agitation on arrival to [facility], requiring Precedex gtt. ENT consulted and Posisep placed to right nares, Surgiflo placed to left nares. Psych consulted and he was started on Buspar and Risperdal and weaned off Precedex gtt. Behaviors improved. Did have urine retention requiring placement of servin by urology. Lasix and ASA were held due to bleeding, but were resumed on discharge. Other chronic issues stable.  When medically stable was discharged to TCU for further rehab and medical management.     TCU Course: Seen today in room, reports he is feeling ok. Staff report had some confusion over the weekend, says he remember this, thought he was back in [place] as he went there in college. Does feels  he has been thinking more clearly the past couple of days. He did not sleep well, says hard to sleep in [place] unfamiliar place. He has chronic pain in left shoulder, says he it was injured by a horse when he was 15. Denies coughing except for when he is eating, which is not new. Does not think he has s disease but is on medications for it. Admits that he does have tremors, shuffled gait and low movements at times. Does feel constipated, wants to take something for this. Has been happy with the cares he has gotten. He lives at home with his wife.     CODE STATUS/ADVANCE DIRECTIVES: CPR/Full code     ALLERGIES:  Allergies   Allergen Reactions     Lisinopril Cough     Losartan Cough     Morphine GI Disturbance and Nausea     Quetiapine Other (See Comments)     Behavioral disturbances - Patient got more agitated after taking seroquel at [facility]     Terbinafine Other (See Comments)     Hematuria       PAST MEDICAL HISTORY:   Past Medical History:   Diagnosis Date     DIABETES MELLITUS TYPE II-UNCOMPL 08/17/2005     Essential hypertension, benign     on meds, Stress Echo 1991 - normal, but suboptimal exertion     Other and unspecified hyperlipidemia      Other isolated or specific phobias     atenolol helps, wants open MRI     Other specified causes of urethral stricture     cystoscopy 1997 some hematuria in past - benign     Severe dementia with anxiety (H) 04/12/2025     Squamous cell carcinoma      PAST SURGICAL HISTORY:   Past Surgical History:   Procedure Laterality Date     SURGICAL HISTORY OF -   1998    open cholecystectomy for gallstones s/p ERCP     SURGICAL HISTORY OF -   1997    cystoscopy     SURGICAL HISTORY OF -   11/1997    Urethral stricture     FAMILY HISTORY:   Family History   Problem Relation Age of Onset     Diabetes Mother      Hypertension Mother      Hypertension Father      Heart Disease Brother         MI x2, CABG, 1st MI at 49-51 yo.      Hypertension Brother      C.A.D. Brother       Alzheimer Disease Brother      Diabetes Sister          maybe     Coronary Artery Disease Brother      Heart Disease Brother      Melanoma No family hx of          SOCIAL HISTORY:   Patient's living condition: lives with spouse    MEDICATIONS  Post Discharge Medication Reconciliation Status: discharge medications reconciled and changed, per note/orders.  Current Outpatient Medications   Medication Sig Dispense Refill     ASPIRIN 81 MG OR TABS Take 1 tablet by mouth daily.       blood glucose (ACCU-CHEK SMARTVIEW) test strip Use to test blood sugar 2 times daily or as directed. 200 each 1     blood glucose (ACCU-CHEK SMARTVIEW) test strip Use to test blood sugar 1 times daily or as directed. 50 strip 12     blood glucose monitoring (ACCU-CHEK FASTCLIX) lancets 1 each 2 times daily Use to test blood sugar 2 times daily or as directed. 200 each 1     busPIRone (BUSPAR) 5 MG tablet Take 1 tablet (5 mg) by mouth 2 times daily.       calcipotriene (DOVONEX) 0.005 % external cream Mix with efudex and apply twice daily to scalp for 12 days (Patient taking differently: Apply topically 2 times daily as needed. Mix with efudex and apply twice daily to scalp for 12 days) 60 g 3     carbidopa-levodopa (SINEMET)  MG tablet Take 1 tablet by mouth 3 times daily.       carvedilol (COREG) 3.125 MG tablet Take 3.125 mg by mouth 2 times daily (with meals).       CINNAMON PO Take 1,000 mg by mouth daily. Take one 1000 mg capsule by mouth daily       cyanocobalamin (VITAMIN B-12) 500 MCG tablet Take 1 tablet by mouth daily.       fluorouracil (EFUDEX) 5 % external cream Mix with dovonex and apply twice daily to scalp for 12 days (Patient taking differently: Apply topically 2 times daily as needed. Mix with dovonex and apply twice daily to scalp for 12 days) 40 g 1     fluticasone (FLONASE) 50 MCG/ACT nasal spray Spray 2 sprays into both nostrils daily 47.4 mL 3     metFORMIN (GLUCOPHAGE) 1000 MG tablet TAKE 1 TABLET BY MOUTH TWICE  "DAILY WITH MEALS 60 tablet 1     mirabegron (MYRBETRIQ) 50 MG 24 hr tablet Take 1 tablet by mouth daily.       Misc Natural Products (TOTAL MEMORY & FOCUS FORMULA) TABS Take 2 Gum by mouth daily.       risperiDONE (RISPERDAL) 0.25 MG tablet Take 1 tablet (0.25 mg) by mouth 2 times daily.       rosuvastatin (CRESTOR) 20 MG tablet Take 1 tablet by mouth once daily 90 tablet 1     tamsulosin (FLOMAX) 0.4 MG capsule Take 1 capsule (0.4 mg) by mouth daily 90 capsule 3     VITAMIN C 500 MG PO TABS Take 1 tablet by mouth daily.       VITAMIN E Take 400 Units by mouth daily.       ROS:  10 point ROS neg other than the symptoms noted above in the HPI.      PHYSICAL EXAM:  BP (!) 150/74   Pulse 77   Temp 97.6  F (36.4  C)   Resp 20   Ht 1.676 m (5' 6\")   Wt 90.4 kg (199 lb 6.4 oz)   SpO2 98%   BMI 32.18 kg/m    Physical Exam  Cardiovascular:      Rate and Rhythm: Normal rate and regular rhythm.      Heart sounds: Normal heart sounds.   Pulmonary:      Effort: Pulmonary effort is normal.      Breath sounds: Normal breath sounds.   Abdominal:      General: Bowel sounds are normal.   Neurological:      Mental Status: He is alert.      Comments: tremors   Psychiatric:         Mood and Affect: Mood normal.      Comments: Memory and judgement fair          LABORATORY/IMAGING DATA:  [facility]    ASSESSMENT/PLAN:  Epistaxis  Closed fracture of nasal bone with routine healing, subsequent encounter  Secondary to fall. S/p posisep to right nares, Surgiflo to left nares, both of which will dissolve. No bleeding noted in TCU. Denies pain.   - monitor, supportive cares  - avoid blowing nose   - flonase daily     Physical deconditioning  Able to walk 100ft with therapy, transfers wt Ax1.   - PT/OT  -  to assist with discharge planning.     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 Some confusion on admission to TCU, appears to be improving. Appropriate on visit today  - continue Risperidone BID, if remains appropriate will consider GDR  - continue Buspar BID   - monitor and adjust, supportive cares    Parkinson's disease without dyskinesia or fluctuating manifestations (H)  Follows with neurology as [facility], last see 1/8/25; admit to coughing with eating  - continue Sinemet TID,   - SLP to eval at treat re : coughing with eating,   - monitor and adjust     Central retinal vein occlusion of right eye, unspecified complication status (H)  Known issue that I take into account for their medical decisions, no current exacerbations or new concerns  - continue rosuvastatin    Hyperlipidemia, unspecified hyperlipidemia type  - continue rosuvastatin     COPD mixed type (H)  Appears compensated, no concerns, Lungs clear on exam  - supportive cares  - monitor and adjust     Morbid obesity (H)  BMI 32, in the setting of HTN, HLD, and DM2,   - dietician to follow in TCU    Type 2 diabetes mellitus without complication, without long-term current use of insulin (H)  A1C 6.4%, below goal of <8% given age and co morbidities. Glipizide discontinued while IP.  -197 in TCU  - check Bg BID  - continue metformin 1000mg BID,  - monitor and adjust     Benign essential hypertension  -150, but therapy reports dropped to the 90s during their session today. He was asymptomatic. Lasix discontinued while IP.  Discussed with patient, nursing staff.   - continue Coreg BID with hold parameters,   - continue ASA    Benign prostatic hyperplasia with urinary retention  Servin catheter in place  History of urethral stricture, difficult servin placement while IP.  Discussed with patient, nursing staff.   - follow-up with urology for voiding trial, scheduled for 4/28  - continue Flomax and mirabegron ER  - encourage fluid intake      Orders:   SLP to eval and treat  Hold  fluorouracil and calcipotriene in TCU  Hold coreg for SBP <110, HR <55  Schedule follow-up with urology for voiding trial  Hold cinnamon in TCU  Encourage fluid intake  Senna-s 1 tab PO daily  APAP 650mg TID and q NOC PRN    Total time spent with patient visit at the skilled nursing facility was 45 min including patient visit and review of past records; includes time spent reviewing records from hospitalization within my organization including labs, imaging reports and provider/consult notes, review of TCU facility records, medication reconciliation, discussion of plan of care with patient, nursing staff and therapy as stated above as well as time spent on documentation.      Electronically signed by:  RONI Díaz CNP       Sincerely,        RONI Díaz CNP    Electronically signed

## 2025-04-15 ENCOUNTER — DOCUMENTATION ONLY (OUTPATIENT)
Dept: OTHER | Facility: CLINIC | Age: 75
End: 2025-04-15
Payer: COMMERCIAL

## 2025-04-15 LAB
GAMMA INTERFERON BACKGROUND BLD IA-ACNC: 0.03 IU/ML
M TB IFN-G BLD-IMP: NEGATIVE
M TB IFN-G CD4+ BCKGRND COR BLD-ACNC: 0.67 IU/ML
MITOGEN IGNF BCKGRD COR BLD-ACNC: 0 IU/ML
MITOGEN IGNF BCKGRD COR BLD-ACNC: 0 IU/ML
QUANTIFERON MITOGEN: 0.7 IU/ML
QUANTIFERON NIL TUBE: 0.03 IU/ML
QUANTIFERON TB1 TUBE: 0.03 IU/ML
QUANTIFERON TB2 TUBE: 0.03

## 2025-04-22 NOTE — PROGRESS NOTES
[facility] GERIATRICS DISCHARGE SUMMARY  Patient Name: Fco Arroyo  YOB: 1950  [place] Medical Record Number: [id number]  Place of Service Where Encounter Took Place: [facility] ([facility]) [[id number]]    PRIMARY CARE PROVIDER AND CLINIC RESPONSIBLE AFTER TRANSFER: Sue Melchor MD, [address] [facility] [address]; Non-FMG Provider     Transferring providers: RONI Vogel CNP; Radha Navarro MD  Recent Hospitalization/ED: [facility] stay 4/5/25 to 4/12/25.  Date of SNF Admission: April 12, 2025  Date of [facility] (anticipated) Discharge: April 27, 2025  Discharged to: previous independent home  Cognitive Scores: SLUMS: 14/30  Physical Function: Ambulating 100 ft with SBA  DME: Walker    CODE STATUS/ADVANCE DIRECTIVES DISCUSSION: Full Code   ALLERGIES: Lisinopril, Losartan, Morphine, Quetiapine, and Terbinafine    NURSING FACILITY COURSE:  Medication Changes/Rationale:   Added APAP, changed to PRN    Summary of nursing facility stay:   Brief Hospital Course: PMH of HTN, DM2, anxiety, parkinson's disease and dementia who had fall on 3/31 with nasal bone fractures. Presented with ongoing Epitaxis for which he had had multiple previous ED visits. Was transferred to [facility] for possible IR embolization. He had significant agitation on arrival to [facility], requiring Precedex gtt. ENT consulted and Posisep placed to right nares, Surgiflo placed to left nares. Psych consulted and he was started on Buspar and Risperdal and weaned off Precedex gtt. Behaviors improved. Did have urine retention requiring placement of servin by urology. Lasix and ASA were held due to bleeding, but were resumed on discharge. Other chronic issues stable.  When medically stable was discharged to TCU for further rehab and medical management.     Epistaxis  Closed fracture of nasal bone with routine healing, subsequent encounter  Physical  deconditioning  Secondary to fall. S/p posisep to right nares, Surgiflo to left nares, both of which will dissolve. No bleeding noted in TCU. Denies pain.   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 Some confusion on admission to TCU, appears to be improving. Appropriate on visit today  - continue Risperidone BID,  - continue Buspar BID     Parkinson's disease without dyskinesia or fluctuating manifestations (H)  [facility], last see 1/8/25; admit to coughing with eating, was seen by SLP improve with safe swallowing strategies.   - continue Sinemet TID,   - Home Care Referral    Central retinal vein occlusion of right eye, unspecified complication status (H)  Hyperlipidemia, unspecified hyperlipidemia type  - continue rosuvastatin     COPD mixed type (H)  Appears compensated, no concerns, Lungs clear on exam     Type 2 diabetes mellitus without complication, without long-term current use of insulin (H)  A1C 6.4%, below goal of <8% given age and co morbidities. Glipizide discontinued while IP. BG  in TCU  - continue metformin BID     Benign essential hypertension  -150. Lasix discontinued while IP, did have episode of hypotension in PT on 4/14, was asymptomatic and did not happen again.  - Coreg BID, ASA    Benign prostatic hyperplasia with urinary retention  Servin catheter in place  History of urethral stricture, difficult servin placement while IP.   - follow-up with urology for voiding trial, scheduled for 4/28  - continue Flomax and mirabegron ER  - encourage fluid intake    Diabetic ulcer of toe of left foot associated with type 1 diabetes mellitus, unspecified ulcer stage (H)  Follows with podiatry, last seen 4/22;  reports has ordered diabetic shoes   - continue current wound cares  - follow-up with podiatry as scheduled       Discharge Medications:  MED REC REQUIRED  Post Medication Reconciliation Status: medication reconcilation previously completed during another office visit    Current Outpatient Medications   Medication Sig Dispense Refill    acetaminophen (TYLENOL) 325 MG tablet Take 650 mg by mouth every 4 hours as needed for pain. And q NOC PRN      ASPIRIN 81 MG OR TABS Take 1 tablet by mouth daily.      blood glucose (ACCU-CHEK SMARTVIEW) test strip Use to test blood sugar 2 times daily or as directed. 200 each 1    blood glucose (ACCU-CHEK SMARTVIEW) test strip Use to test blood sugar 1 times daily or as directed. 50 strip 12    blood glucose monitoring (ACCU-CHEK FASTCLIX) lancets 1 each 2 times daily Use to test blood sugar 2 times daily or as directed. 200 each 1    busPIRone (BUSPAR) 5 MG tablet Take 1 tablet (5 mg) by mouth 2 times daily.      calcipotriene (DOVONEX) 0.005 % external cream Mix with efudex and apply twice daily to scalp for 12 days (Patient taking differently: Apply topically 2 times daily as needed. Mix with efudex and apply twice daily to scalp for 12 days) 60 g 3    carbidopa-levodopa (SINEMET)  MG tablet Take 1 tablet by mouth 3 times daily.      carvedilol (COREG) 3.125 MG tablet Take 3.125 mg by mouth 2 times daily (with meals).      CINNAMON PO Take 1,000 mg by mouth daily. Take one 1000 mg capsule by mouth daily      cyanocobalamin (VITAMIN B-12) 500 MCG tablet Take 1 tablet by mouth daily.      fluorouracil (EFUDEX) 5 % external cream Mix with dovonex and apply twice daily to scalp for 12 days (Patient taking differently: Apply topically 2 times daily as needed. Mix with dovonex and apply twice daily to scalp for 12 days) 40 g 1    fluticasone (FLONASE) 50 MCG/ACT nasal spray Spray 2 sprays into both nostrils daily 47.4 mL 3    metFORMIN (GLUCOPHAGE) 1000 MG tablet TAKE 1 TABLET  "BY MOUTH TWICE DAILY WITH MEALS 60 tablet 1    mirabegron (MYRBETRIQ) 50 MG 24 hr tablet Take 1 tablet by mouth daily.      Misc Natural Products (TOTAL MEMORY & FOCUS FORMULA) TABS Take 2 Gum by mouth daily.      risperiDONE (RISPERDAL) 0.25 MG tablet Take 1 tablet (0.25 mg) by mouth 2 times daily.      rosuvastatin (CRESTOR) 20 MG tablet Take 1 tablet by mouth once daily 90 tablet 1    tamsulosin (FLOMAX) 0.4 MG capsule Take 1 capsule (0.4 mg) by mouth daily 90 capsule 3    VITAMIN C 500 MG PO TABS Take 1 tablet by mouth daily.      VITAMIN E Take 400 Units by mouth daily.       Controlled medications:   not applicable/none     Past Medical History:   Past Medical History:   Diagnosis Date    DIABETES MELLITUS TYPE II-UNCOMPL 08/17/2005    Essential hypertension, benign     on meds, Stress Echo 1991 - normal, but suboptimal exertion    Other and unspecified hyperlipidemia     Other isolated or specific phobias     atenolol helps, wants open MRI    Other specified causes of urethral stricture     cystoscopy 1997 some hematuria in past - benign    Severe dementia with anxiety (H) 04/12/2025    Squamous cell carcinoma      Physical Exam:   Vitals: BP (!) 159/73   Pulse 84   Temp 98.2  F (36.8  C)   Resp 18   Ht 1.676 m (5' 6\")   Wt 92.5 kg (204 lb)   SpO2 96%   BMI 32.93 kg/m    BMI: Body mass index is 32.93 kg/m .  GENERAL APPEARANCE:  Alert, in no distress, cooperative,   RESP:  lungs clear to auscultation , no respiratory distress   CV:  regular rate and rhythm, no murmur, rub, or gallop, no edema  ABDOMEN:  bowel sounds normal,   NEURO:   Cn 2-12 grossly intact, normal  PSYCH:  affect and mood normal       SNF Labs: Recent labs in [facility] reviewed by me today.  and Most Recent 3 CBC's:  Recent Labs   Lab Test 04/12/25  0543 04/11/25  0517 04/10/25  0550   WBC 10.2 8.9 9.3   HGB 11.7* 11.6* 11.2*   MCV 95 94 89    202 191     Most Recent 3 BMP's:  Recent Labs   Lab Test 04/12/25  1423 04/12/25  1201 " 04/12/25  0806 04/12/25  0543 04/11/25  0837 04/11/25  0517 04/10/25  1007 04/10/25  0550   NA  --   --   --  141  --  143  --  140   POTASSIUM  --   --   --  3.7  --  3.5  --  3.6   CHLORIDE  --   --   --  107  --  107  --  106   CO2  --   --   --  24  --  24  --  24   BUN  --   --   --  16.8  --  17.2  --  17.9   CR  --   --   --  0.82  --  0.85  --  0.89   ANIONGAP  --   --   --  10  --  12  --  10   RABIA  --   --   --  9.1  --  9.2  --  8.8   * 160* 130* 134*   < > 113*   < > 92    < > = values in this interval not displayed.       DISCHARGE PLAN:  Follow up labs: No labs orders/due  Medical Follow Up:   Follow up with primary care provider in 2 weeks  Follow up with specialist urology, podiatry as scheduled   Current [place] scheduled appointments: None.   Discharge Services: Home Care: Physical Therapy, Occupational Therapy, Registered Nurse, Home Health Aide. From: [facility].  Discharge Instructions Verbalized to Patient at Discharge:   24-hour supervision is recommended for safety.   Continue current catheter cares  Continue current wound cares    TOTAL DISCHARGE TIME: Greater than 30 minutes  Electronically signed by:  RONI Díaz CNP    Home care Face to Face documentation done in [facility] attached to Home care orders for [facility].

## 2025-04-24 ENCOUNTER — DISCHARGE SUMMARY NURSING HOME (OUTPATIENT)
Dept: GERIATRICS | Facility: CLINIC | Age: 75
End: 2025-04-24
Payer: COMMERCIAL

## 2025-04-24 VITALS
TEMPERATURE: 98.2 F | WEIGHT: 204 LBS | DIASTOLIC BLOOD PRESSURE: 73 MMHG | HEIGHT: 66 IN | RESPIRATION RATE: 18 BRPM | HEART RATE: 84 BPM | BODY MASS INDEX: 32.78 KG/M2 | SYSTOLIC BLOOD PRESSURE: 159 MMHG | OXYGEN SATURATION: 96 %

## 2025-04-24 DIAGNOSIS — G20.A1: ICD-10-CM

## 2025-04-24 DIAGNOSIS — R33.8 BENIGN PROSTATIC HYPERPLASIA WITH URINARY RETENTION: ICD-10-CM

## 2025-04-24 DIAGNOSIS — I10 BENIGN ESSENTIAL HYPERTENSION: ICD-10-CM

## 2025-04-24 DIAGNOSIS — F02.C4 SEVERE LEWY BODY DEMENTIA WITH ANXIETY (H): Chronic | ICD-10-CM

## 2025-04-24 DIAGNOSIS — F02.C4: ICD-10-CM

## 2025-04-24 DIAGNOSIS — J44.9 COPD MIXED TYPE (H): ICD-10-CM

## 2025-04-24 DIAGNOSIS — G31.83 SEVERE LEWY BODY DEMENTIA WITH ANXIETY (H): Chronic | ICD-10-CM

## 2025-04-24 DIAGNOSIS — R53.81 PHYSICAL DECONDITIONING: ICD-10-CM

## 2025-04-24 DIAGNOSIS — E11.9 TYPE 2 DIABETES MELLITUS WITHOUT COMPLICATION, WITHOUT LONG-TERM CURRENT USE OF INSULIN (H): ICD-10-CM

## 2025-04-24 DIAGNOSIS — G20.A1 PARKINSON'S DISEASE WITHOUT DYSKINESIA OR FLUCTUATING MANIFESTATIONS (H): ICD-10-CM

## 2025-04-24 DIAGNOSIS — S02.2XXD CLOSED FRACTURE OF NASAL BONE WITH ROUTINE HEALING, SUBSEQUENT ENCOUNTER: ICD-10-CM

## 2025-04-24 DIAGNOSIS — Z97.8 FOLEY CATHETER IN PLACE: ICD-10-CM

## 2025-04-24 DIAGNOSIS — F41.1 GENERALIZED ANXIETY DISORDER: ICD-10-CM

## 2025-04-24 DIAGNOSIS — R04.0 EPISTAXIS: Primary | ICD-10-CM

## 2025-04-24 DIAGNOSIS — H34.8112 CENTRAL RETINAL VEIN OCCLUSION OF RIGHT EYE, UNSPECIFIED COMPLICATION STATUS (H): ICD-10-CM

## 2025-04-24 DIAGNOSIS — N40.1 BENIGN PROSTATIC HYPERPLASIA WITH URINARY RETENTION: ICD-10-CM

## 2025-04-24 DIAGNOSIS — E78.5 HYPERLIPIDEMIA, UNSPECIFIED HYPERLIPIDEMIA TYPE: ICD-10-CM

## 2025-04-24 DIAGNOSIS — E10.621 DIABETIC ULCER OF TOE OF LEFT FOOT ASSOCIATED WITH TYPE 1 DIABETES MELLITUS, UNSPECIFIED ULCER STAGE (H): ICD-10-CM

## 2025-04-24 DIAGNOSIS — L97.529 DIABETIC ULCER OF TOE OF LEFT FOOT ASSOCIATED WITH TYPE 1 DIABETES MELLITUS, UNSPECIFIED ULCER STAGE (H): ICD-10-CM

## 2025-04-24 RX ORDER — BUSPIRONE HYDROCHLORIDE 5 MG/1
5 TABLET ORAL 2 TIMES DAILY
Qty: 60 TABLET | Refills: 0 | Status: SHIPPED | OUTPATIENT
Start: 2025-04-24

## 2025-04-24 RX ORDER — RISPERIDONE 0.25 MG/1
0.25 TABLET ORAL 2 TIMES DAILY
Qty: 60 TABLET | Refills: 0 | Status: SHIPPED | OUTPATIENT
Start: 2025-04-24

## 2025-04-24 NOTE — LETTER
4/24/2025      Fco Arroyo  [address]  [place] [address]        [facility] GERIATRICS DISCHARGE SUMMARY  Patient Name: Fco Arroyo  YOB: 1950  [place] Medical Record Number: [id number]  Place of Service Where Encounter Took Place: [facility] (SNF) [[id number]]    PRIMARY CARE PROVIDER AND CLINIC RESPONSIBLE AFTER TRANSFER: Sue Melchor MD, [address] [facility] [address]; Non-FM Provider     Transferring providers: RONI Vogel CNP; Radha Navarro MD  Recent Hospitalization/ED: [facility] stay 4/5/25 to 4/12/25.  Date of SNF Admission: April 12, 2025  Date of SNF (anticipated) Discharge: April 27, 2025  Discharged to: previous independent home  Cognitive Scores: SLUMS: 14/30  Physical Function: Ambulating 100 ft with SBA  DME: Walker    CODE STATUS/ADVANCE DIRECTIVES DISCUSSION: Full Code   ALLERGIES: Lisinopril, Losartan, Morphine, Quetiapine, and Terbinafine    NURSING FACILITY COURSE:  Medication Changes/Rationale:   Added APAP, changed to PRN    Summary of nursing facility stay:   Brief Hospital Course: PMH of HTN, DM2, anxiety, parkinson's disease and dementia who had fall on 3/31 with nasal bone fractures. Presented with ongoing Epitaxis for which he had had multiple previous ED visits. Was transferred to [facility] for possible IR embolization. He had significant agitation on arrival to [facility], requiring Precedex gtt. ENT consulted and Posisep placed to right nares, Surgiflo placed to left nares. Psych consulted and he was started on Buspar and Risperdal and weaned off Precedex gtt. Behaviors improved. Did have urine retention requiring placement of servin by urology. Lasix and ASA were held due to bleeding, but were resumed on discharge. Other chronic issues stable.  When medically stable was discharged to TCU for further rehab and medical management.     Epistaxis  Closed  fracture of nasal bone with routine healing, subsequent encounter  Physical deconditioning  Secondary to fall. S/p posisep to right nares, Surgiflo to left nares, both of which will dissolve. No bleeding noted in TCU. Denies pain.   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 Some confusion on admission to TCU, appears to be improving. Appropriate on visit today  - continue Risperidone BID,  - continue Buspar BID     Parkinson's disease without dyskinesia or fluctuating manifestations (H)  [facility], last see 1/8/25; admit to coughing with eating, was seen by SLP improve with safe swallowing strategies.   - continue Sinemet TID,   - Home Care Referral    Central retinal vein occlusion of right eye, unspecified complication status (H)  Hyperlipidemia, unspecified hyperlipidemia type  - continue rosuvastatin     COPD mixed type (H)  Appears compensated, no concerns, Lungs clear on exam     Type 2 diabetes mellitus without complication, without long-term current use of insulin (H)  A1C 6.4%, below goal of <8% given age and co morbidities. Glipizide discontinued while IP. BG  in TCU  - continue metformin BID     Benign essential hypertension  -150. Lasix discontinued while IP, did have episode of hypotension in PT on 4/14, was asymptomatic and did not happen again.  - Coreg BID, ASA    Benign prostatic hyperplasia with urinary retention  Servin catheter in place  History of urethral stricture, difficult servin placement while IP.   - follow-up with urology for voiding trial, scheduled for 4/28  - continue Flomax and mirabegron ER  - encourage fluid intake    Diabetic ulcer of toe of left foot associated with type 1 diabetes  mellitus, unspecified ulcer stage (H)  Follows with podiatry, last seen 4/22; reports has ordered diabetic shoes   - continue current wound cares  - follow-up with podiatry as scheduled       Discharge Medications:  MED REC REQUIRED  Post Medication Reconciliation Status: medication reconcilation previously completed during another office visit    Current Outpatient Medications   Medication Sig Dispense Refill     acetaminophen (TYLENOL) 325 MG tablet Take 650 mg by mouth every 4 hours as needed for pain. And q NOC PRN       ASPIRIN 81 MG OR TABS Take 1 tablet by mouth daily.       blood glucose (ACCU-CHEK SMARTVIEW) test strip Use to test blood sugar 2 times daily or as directed. 200 each 1     blood glucose (ACCU-CHEK SMARTVIEW) test strip Use to test blood sugar 1 times daily or as directed. 50 strip 12     blood glucose monitoring (ACCU-CHEK FASTCLIX) lancets 1 each 2 times daily Use to test blood sugar 2 times daily or as directed. 200 each 1     busPIRone (BUSPAR) 5 MG tablet Take 1 tablet (5 mg) by mouth 2 times daily.       calcipotriene (DOVONEX) 0.005 % external cream Mix with efudex and apply twice daily to scalp for 12 days (Patient taking differently: Apply topically 2 times daily as needed. Mix with efudex and apply twice daily to scalp for 12 days) 60 g 3     carbidopa-levodopa (SINEMET)  MG tablet Take 1 tablet by mouth 3 times daily.       carvedilol (COREG) 3.125 MG tablet Take 3.125 mg by mouth 2 times daily (with meals).       CINNAMON PO Take 1,000 mg by mouth daily. Take one 1000 mg capsule by mouth daily       cyanocobalamin (VITAMIN B-12) 500 MCG tablet Take 1 tablet by mouth daily.       fluorouracil (EFUDEX) 5 % external cream Mix with dovonex and apply twice daily to scalp for 12 days (Patient taking differently: Apply topically 2 times daily as needed. Mix with dovonex and apply twice daily to scalp for 12 days) 40 g 1     fluticasone (FLONASE) 50 MCG/ACT nasal spray Spray 2 sprays  "into both nostrils daily 47.4 mL 3     metFORMIN (GLUCOPHAGE) 1000 MG tablet TAKE 1 TABLET BY MOUTH TWICE DAILY WITH MEALS 60 tablet 1     mirabegron (MYRBETRIQ) 50 MG 24 hr tablet Take 1 tablet by mouth daily.       Misc Natural Products (TOTAL MEMORY & FOCUS FORMULA) TABS Take 2 Gum by mouth daily.       risperiDONE (RISPERDAL) 0.25 MG tablet Take 1 tablet (0.25 mg) by mouth 2 times daily.       rosuvastatin (CRESTOR) 20 MG tablet Take 1 tablet by mouth once daily 90 tablet 1     tamsulosin (FLOMAX) 0.4 MG capsule Take 1 capsule (0.4 mg) by mouth daily 90 capsule 3     VITAMIN C 500 MG PO TABS Take 1 tablet by mouth daily.       VITAMIN E Take 400 Units by mouth daily.       Controlled medications:   not applicable/none     Past Medical History:   Past Medical History:   Diagnosis Date     DIABETES MELLITUS TYPE II-UNCOMPL 08/17/2005     Essential hypertension, benign     on meds, Stress Echo 1991 - normal, but suboptimal exertion     Other and unspecified hyperlipidemia      Other isolated or specific phobias     atenolol helps, wants open MRI     Other specified causes of urethral stricture     cystoscopy 1997 some hematuria in past - benign     Severe dementia with anxiety (H) 04/12/2025     Squamous cell carcinoma      Physical Exam:   Vitals: BP (!) 159/73   Pulse 84   Temp 98.2  F (36.8  C)   Resp 18   Ht 1.676 m (5' 6\")   Wt 92.5 kg (204 lb)   SpO2 96%   BMI 32.93 kg/m    BMI: Body mass index is 32.93 kg/m .  GENERAL APPEARANCE:  Alert, in no distress, cooperative,   RESP:  lungs clear to auscultation , no respiratory distress   CV:  regular rate and rhythm, no murmur, rub, or gallop, no edema  ABDOMEN:  bowel sounds normal,   NEURO:   Cn 2-12 grossly intact, normal  PSYCH:  affect and mood normal       SNF Labs: Recent labs in [facility] reviewed by me today.  and Most Recent 3 CBC's:  Recent Labs   Lab Test 04/12/25  0543 04/11/25  0517 04/10/25  0550   WBC 10.2 8.9 9.3   HGB 11.7* 11.6* 11.2*   MCV 95 " 94 89    202 191     Most Recent 3 BMP's:  Recent Labs   Lab Test 04/12/25  1423 04/12/25  1201 04/12/25  0806 04/12/25  0543 04/11/25  0837 04/11/25  0517 04/10/25  1007 04/10/25  0550   NA  --   --   --  141  --  143  --  140   POTASSIUM  --   --   --  3.7  --  3.5  --  3.6   CHLORIDE  --   --   --  107  --  107  --  106   CO2  --   --   --  24  --  24  --  24   BUN  --   --   --  16.8  --  17.2  --  17.9   CR  --   --   --  0.82  --  0.85  --  0.89   ANIONGAP  --   --   --  10  --  12  --  10   RABIA  --   --   --  9.1  --  9.2  --  8.8   * 160* 130* 134*   < > 113*   < > 92    < > = values in this interval not displayed.       DISCHARGE PLAN:  Follow up labs: No labs orders/due  Medical Follow Up:   Follow up with primary care provider in 2 weeks  Follow up with specialist urology, podiatry as scheduled   Current [place] scheduled appointments: None.   Discharge Services: Home Care: Physical Therapy, Occupational Therapy, Registered Nurse, Home Health Aide. From: [facility].  Discharge Instructions Verbalized to Patient at Discharge:   24-hour supervision is recommended for safety.   Continue current catheter cares  Continue current wound cares    TOTAL DISCHARGE TIME: Greater than 30 minutes  Electronically signed by:  RONI Díaz CNP    Home care Face to Face documentation done in [facility] attached to Home care orders for [facility].       Sincerely,        RONI Díaz CNP    Electronically signed

## 2025-04-29 ENCOUNTER — OFFICE VISIT (OUTPATIENT)
Dept: UROLOGY | Facility: CLINIC | Age: 75
End: 2025-04-29
Payer: COMMERCIAL

## 2025-04-29 VITALS
BODY MASS INDEX: 32.47 KG/M2 | TEMPERATURE: 98.1 F | RESPIRATION RATE: 17 BRPM | HEIGHT: 66 IN | SYSTOLIC BLOOD PRESSURE: 130 MMHG | DIASTOLIC BLOOD PRESSURE: 82 MMHG | HEART RATE: 85 BPM | WEIGHT: 202 LBS | OXYGEN SATURATION: 95 %

## 2025-04-29 DIAGNOSIS — N40.1 BENIGN PROSTATIC HYPERPLASIA WITH URINARY OBSTRUCTION: ICD-10-CM

## 2025-04-29 DIAGNOSIS — N13.8 BENIGN PROSTATIC HYPERPLASIA WITH URINARY OBSTRUCTION: ICD-10-CM

## 2025-04-29 RX ORDER — FINASTERIDE 5 MG/1
5 TABLET, FILM COATED ORAL DAILY
Qty: 90 TABLET | Refills: 2 | Status: SHIPPED | OUTPATIENT
Start: 2025-04-29

## 2025-04-29 ASSESSMENT — PAIN SCALES - GENERAL: PAINLEVEL_OUTOF10: NO PAIN (0)

## 2025-04-29 NOTE — PROGRESS NOTES
S: Fco Arroyo is a pleasant 75 year old male with history of dementia, parkinsons and BPH requesting to be seen by Mike Dominguez MD for acute urinary retention.     History of Present Illness-  - History of benign prostatic hyperplasia (BPH).  - Currently on tamsulosin 0.4 mg and Myrbetriq 50 mg.  - Admitted to the hospital on April 5, 2025, due to anxiety and a recent fall resulting in nasal bone fractures.- Bladder scan showed greater than 800 milliliters of urine retention during the recent hospital visit. Experiencing acute urinary retention requiring indwelling urinary catheter placement (16 French 10 cc balloon).  - Dislikes the catheter due to its confining nature and the need to carry a bag.  - Has been on tamsulosin since 2020.  - Underwent surgery in his 20s (around 5502-4052) for urethral stricture, and was fine until 2020.  - Currently being followed by urologist through [facility], Dilation of urethral stricture on 11/29/2024.   - They are presenting to clinic today because they could not see current urologist, for TOV.   Current Outpatient Medications   Medication Sig Dispense Refill    acetaminophen (TYLENOL) 325 MG tablet Take 650 mg by mouth every 4 hours as needed for pain. And q NOC PRN      ASPIRIN 81 MG OR TABS Take 1 tablet by mouth daily.      blood glucose (ACCU-CHEK SMARTVIEW) test strip Use to test blood sugar 2 times daily or as directed. 200 each 1    blood glucose (ACCU-CHEK SMARTVIEW) test strip Use to test blood sugar 1 times daily or as directed. 50 strip 12    blood glucose monitoring (ACCU-CHEK FASTCLIX) lancets 1 each 2 times daily Use to test blood sugar 2 times daily or as directed. 200 each 1    busPIRone (BUSPAR) 5 MG tablet Take 1 tablet (5 mg) by mouth 2 times daily. 60 tablet 0    calcipotriene (DOVONEX) 0.005 % external cream Mix with efudex and apply twice daily to scalp for 12 days (Patient taking differently: Apply topically 2 times daily as needed. Mix with efudex  and apply twice daily to scalp for 12 days) 60 g 3    carbidopa-levodopa (SINEMET)  MG tablet Take 1 tablet by mouth 3 times daily.      carvedilol (COREG) 3.125 MG tablet Take 3.125 mg by mouth 2 times daily (with meals).      CINNAMON PO Take 1,000 mg by mouth daily. Take one 1000 mg capsule by mouth daily      cyanocobalamin (VITAMIN B-12) 500 MCG tablet Take 1 tablet by mouth daily.      finasteride (PROSCAR) 5 MG tablet Take 1 tablet (5 mg) by mouth daily. 90 tablet 2    fluorouracil (EFUDEX) 5 % external cream Mix with dovonex and apply twice daily to scalp for 12 days (Patient taking differently: Apply topically 2 times daily as needed. Mix with dovonex and apply twice daily to scalp for 12 days) 40 g 1    fluticasone (FLONASE) 50 MCG/ACT nasal spray Spray 2 sprays into both nostrils daily 47.4 mL 3    metFORMIN (GLUCOPHAGE) 1000 MG tablet TAKE 1 TABLET BY MOUTH TWICE DAILY WITH MEALS 60 tablet 1    mirabegron (MYRBETRIQ) 50 MG 24 hr tablet Take 1 tablet by mouth daily.      Misc Natural Products (TOTAL MEMORY & FOCUS FORMULA) TABS Take 2 Gum by mouth daily.      risperiDONE (RISPERDAL) 0.25 MG tablet Take 1 tablet (0.25 mg) by mouth 2 times daily. 60 tablet 0    rosuvastatin (CRESTOR) 20 MG tablet Take 1 tablet by mouth once daily 90 tablet 1    tamsulosin (FLOMAX) 0.4 MG capsule Take 1 capsule (0.4 mg) by mouth daily 90 capsule 3    VITAMIN C 500 MG PO TABS Take 1 tablet by mouth daily.      VITAMIN E Take 400 Units by mouth daily.       Allergies   Allergen Reactions    Lisinopril Cough    Losartan Cough    Morphine GI Disturbance and Nausea    Quetiapine Other (See Comments)     Behavioral disturbances - Patient got more agitated after taking seroquel at [facility]    Terbinafine Other (See Comments)     Hematuria     Past Medical History:   Diagnosis Date    DIABETES MELLITUS TYPE II-UNCOMPL 08/17/2005    Essential hypertension, benign     on meds, Stress Echo 1991 - normal, but suboptimal  exertion    Other and unspecified hyperlipidemia     Other isolated or specific phobias     atenolol helps, wants open MRI    Other specified causes of urethral stricture     cystoscopy 1997 some hematuria in past - benign    Severe dementia with anxiety (H) 04/12/2025    Squamous cell carcinoma      Past Surgical History:   Procedure Laterality Date    SURGICAL HISTORY OF -   1998    open cholecystectomy for gallstones s/p ERCP    SURGICAL HISTORY OF -   1997    cystoscopy    SURGICAL HISTORY OF -   11/1997    Urethral stricture      Family History   Problem Relation Age of Onset    Diabetes Mother     Hypertension Mother     Hypertension Father     Heart Disease Brother         MI x2, CABG, 1st MI at 49-51 yo.     Hypertension Brother     C.A.D. Brother     Alzheimer Disease Brother     Diabetes Sister          maybe    Coronary Artery Disease Brother     Heart Disease Brother     Melanoma No family hx of      Social History     Socioeconomic History    Marital status:      Spouse name: None    Number of children: None    Years of education: None    Highest education level: None   Tobacco Use    Smoking status: Former     Types: Cigarettes    Smokeless tobacco: Never   Substance and Sexual Activity    Alcohol use: Yes     Comment: rare    Drug use: No    Sexual activity: Not Currently   Other Topics Concern    Parent/sibling w/ CABG, MI or angioplasty before 65F 55M? Yes     Social Drivers of Health     Financial Resource Strain: Unknown (4/9/2025)    Financial Resource Strain     Within the past 12 months, have you or your family members you live with been unable to get utilities (heat, electricity) when it was really needed?: Patient unable to answer   Food Insecurity: Unknown (4/9/2025)    Food Insecurity     Within the past 12 months, did you worry that your food would run out before you got money to buy more?: Patient unable to answer     Within the past 12 months, did the food you bought just not last  "and you didn t have money to get more?: Patient unable to answer   Transportation Needs: Unknown (4/9/2025)    Transportation Needs     Within the past 12 months, has lack of transportation kept you from medical appointments, getting your medicines, non-medical meetings or appointments, work, or from getting things that you need?: Patient unable to answer   Social Connections: Socially Integrated (11/30/2024)    Received from [facility]    Social Connections     Do you often feel lonely or isolated from those around you?: 0   Interpersonal Safety: Unknown (4/9/2025)    Interpersonal Safety     Do you feel physically and emotionally safe where you currently live?: Patient unable to answer     Within the past 12 months, have you been hit, slapped, kicked or otherwise physically hurt by someone?: Patient unable to answer     Within the past 12 months, have you been humiliated or emotionally abused in other ways by your partner or ex-partner?: Patient unable to answer   Housing Stability: Unknown (4/9/2025)    Housing Stability     Do you have housing? : Patient unable to answer     Are you worried about losing your housing?: Patient unable to answer       REVIEW OF SYSTEMS  =================  C: NEGATIVE for fever, chills, change in weight  I: NEGATIVE for worrisome rashes, moles or lesions  E/M: NEGATIVE for ear, mouth and throat problems  R: NEGATIVE for significant cough or SHORTNESS OF BREATH  CV:  NEGATIVE for chest pain, palpitations or peripheral edema  GI: NEGATIVE for nausea, abdominal pain, heartburn, or change in bowel habits  NEURO: NEGATIVE numbness/weakness  : see HPI  PSYCH: NEGATIVE depression/anxiety  LYmph: no new enlarged lymph nodes  Ortho: no new trauma/movements      Physical Exam:  /82 (BP Location: Right arm, Patient Position: Sitting, Cuff Size: Adult Regular)   Pulse 85   Temp 98.1  F (36.7  C) (Temporal)   Resp 17   Ht 1.676 m (5' 6\")   Wt 91.6 " kg (202 lb)   SpO2 95%   BMI 32.60 kg/m     Patient is pleasant, in no acute distress, good general condition.  Heart:  negative, PMI normal  Lung: no evidence of respiratory distress    Abdomen: Soft, nondistended, non tender. No masses. No rebound or guarding.   Exam: Fore skin retracted causing erythema just below glands, Servin catheter intact draining yellow tinged urine.   Skin: Warm and dry.  No redness.  Neuro: grossly normal  Musculaskeletal: moving all extremities  Psych normal mood and affect  Musculoskeletal  moving all extremities  Hematologic/Lymphatic/Immunologic: normal ant/post cervical, axillary, supraclavicular and inguinal nodes    TOV:   150 ml sterile water instilled into bladder through existing servin catheter. Balloon deflated and catheter removed without problem. I was able to return foreskin over the glands. Patient unable to urinate.        Assessment/Plan:     Assessment & Plan  Benign prostatic hyperplasia with urinary obstruction:  - Enlarged prostate causing urinary obstruction, leading to acute urinary retention. Did not pass TOV, could be small amount of sterile water that was instilled. Plan to send home if unable  to urinate returnt to clinic for catheter insertion.  - Start finasteride 5 mg po q day,  acknowledging it takes time to take effect. Consider surgical intervention if medication management fails. Next step would be for Cystoscopy. Continue current medications, adding finasteride. Follow up with primary Urologist.    RONI Gomez CNP    Consent was obtained from the patient to use an AI documentation tool in the creation of this note.  Voice recognition software was also used.  There may be associated transcribing errors.    I spent a total of 47 minutes spent on the date of the encounter doing chart review, history and exam, documentation, and further activities as noted above.

## 2025-05-13 ENCOUNTER — OFFICE VISIT (OUTPATIENT)
Dept: UROLOGY | Facility: CLINIC | Age: 75
End: 2025-05-13
Payer: COMMERCIAL

## 2025-05-13 VITALS
DIASTOLIC BLOOD PRESSURE: 58 MMHG | OXYGEN SATURATION: 97 % | SYSTOLIC BLOOD PRESSURE: 128 MMHG | TEMPERATURE: 97.8 F | BODY MASS INDEX: 32.47 KG/M2 | HEIGHT: 66 IN | WEIGHT: 202 LBS | HEART RATE: 85 BPM | RESPIRATION RATE: 17 BRPM

## 2025-05-13 DIAGNOSIS — R33.9 URINARY RETENTION: ICD-10-CM

## 2025-05-13 DIAGNOSIS — R33.9 RETENTION OF URINE: ICD-10-CM

## 2025-05-13 DIAGNOSIS — N13.8 BENIGN PROSTATIC HYPERPLASIA WITH URINARY OBSTRUCTION: Primary | ICD-10-CM

## 2025-05-13 DIAGNOSIS — N40.1 BENIGN PROSTATIC HYPERPLASIA WITH URINARY OBSTRUCTION: Primary | ICD-10-CM

## 2025-05-13 RX ORDER — TAMSULOSIN HYDROCHLORIDE 0.4 MG/1
0.4 CAPSULE ORAL DAILY
Qty: 90 CAPSULE | Refills: 3 | Status: SHIPPED | OUTPATIENT
Start: 2025-05-13

## 2025-05-13 ASSESSMENT — PAIN SCALES - GENERAL: PAINLEVEL_OUTOF10: NO PAIN (0)

## 2025-05-13 NOTE — PATIENT INSTRUCTIONS
"Clement Arroyo, it was nice to meet you!    Thank you for allowing us the privilege of caring for you. We hope we provided you with the excellent service you deserve.   Please let us know if there is anything else we can do for you.  We want you to be completely satisfied with your care experience.    To ensure the quality of our services, you may be receiving a patient satisfaction survey from an independent patient satisfaction monitoring company.    The greatest compliment you can give is a \"Likely to Recommend.\"    Your visit was with RONI Gomez CNP today.    Instructions per today's visit:     Continue with current medications both Tamsulosin and Finasteride   Follow up with urology surgeon if needed   Please reach out with any additional questions or concerns.   I am so pleased you are doing well!       ___________________________________________________________________________  Important contact and scheduling information:  Please call our contact center at [phone] to schedule your next appointments or to reach our nurse triage line.  Please call during clinic hours Monday through Friday 8:00a - 4:30p if you have questions.  You can contact us anytime via [employer] and we will reply during clinic hours.    Lab results will be communicated through My Chart or letter (if My Chart not used). Please call the clinic if you have not received communication after 1 week or if you have any questions.?  __________________________________________________________________________    If labs were ordered today:    Please make an appointment to have them drawn at your convenience.     To schedule the Lab Appointment using [employer]:  Select \"Schedule an Appointment\"  Select \"Lab Only\"  Answer simple questions about where you would like to be seen and your type of insurance  For \"Which locations work for you?, select the location and set up the appointment.      "

## 2025-05-13 NOTE — PROGRESS NOTES
Chief Complaint   Patient presents with    Urinary Retention    Benign Prostatic Hypertrophy       Fco Arroyo is a 75 year old male who presents today for follow up of   Chief Complaint   Patient presents with    Urinary Retention    Benign Prostatic Hypertrophy     History of Present Illness-  -Mr. Arroyo presents 2 weeks, after failing in office TOV.  - Recently started finasteride 5 mg po q day in addition to Tamsulosin to manage BPH.  - Patient reports that symptoms have greatly improved since starting medication. He states that his stream is more forceful with less urgency.  - Experienced issues with bladder emptying in December, leading to a visit to [facility].  - Experienced swelling in feet and ankles, which fluctuates. Reports feeling tired, potentially related to blood pressure medication, which is a beta blocker. Blood pressure is well-controlled, almost at a level typical for a younger individual.    Current Outpatient Medications   Medication Sig Dispense Refill    acetaminophen (TYLENOL) 325 MG tablet Take 650 mg by mouth every 4 hours as needed for pain. And q NOC PRN      ASPIRIN 81 MG OR TABS Take 1 tablet by mouth daily.      blood glucose (ACCU-CHEK SMARTVIEW) test strip Use to test blood sugar 2 times daily or as directed. 200 each 1    blood glucose (ACCU-CHEK SMARTVIEW) test strip Use to test blood sugar 1 times daily or as directed. 50 strip 12    blood glucose monitoring (ACCU-CHEK FASTCLIX) lancets 1 each 2 times daily Use to test blood sugar 2 times daily or as directed. 200 each 1    busPIRone (BUSPAR) 5 MG tablet Take 1 tablet (5 mg) by mouth 2 times daily. 60 tablet 0    calcipotriene (DOVONEX) 0.005 % external cream Mix with efudex and apply twice daily to scalp for 12 days (Patient taking differently: Apply topically 2 times daily as needed. Mix with efudex and apply twice daily to scalp for 12 days) 60 g 3    carbidopa-levodopa (SINEMET)  MG tablet Take 1  tablet by mouth 3 times daily.      carvedilol (COREG) 3.125 MG tablet Take 3.125 mg by mouth 2 times daily (with meals).      CINNAMON PO Take 1,000 mg by mouth daily. Take one 1000 mg capsule by mouth daily      cyanocobalamin (VITAMIN B-12) 500 MCG tablet Take 1 tablet by mouth daily.      finasteride (PROSCAR) 5 MG tablet Take 1 tablet (5 mg) by mouth daily. 90 tablet 2    fluorouracil (EFUDEX) 5 % external cream Mix with dovonex and apply twice daily to scalp for 12 days (Patient taking differently: Apply topically 2 times daily as needed. Mix with dovonex and apply twice daily to scalp for 12 days) 40 g 1    fluticasone (FLONASE) 50 MCG/ACT nasal spray Spray 2 sprays into both nostrils daily 47.4 mL 3    metFORMIN (GLUCOPHAGE) 1000 MG tablet TAKE 1 TABLET BY MOUTH TWICE DAILY WITH MEALS 60 tablet 1    mirabegron (MYRBETRIQ) 50 MG 24 hr tablet Take 1 tablet by mouth daily.      Misc Natural Products (TOTAL MEMORY & FOCUS FORMULA) TABS Take 2 Gum by mouth daily.      risperiDONE (RISPERDAL) 0.25 MG tablet Take 1 tablet (0.25 mg) by mouth 2 times daily. 60 tablet 0    rosuvastatin (CRESTOR) 20 MG tablet Take 1 tablet by mouth once daily 90 tablet 1    tamsulosin (FLOMAX) 0.4 MG capsule Take 1 capsule (0.4 mg) by mouth daily 90 capsule 3    VITAMIN C 500 MG PO TABS Take 1 tablet by mouth daily.      VITAMIN E Take 400 Units by mouth daily.       Allergies   Allergen Reactions    Lisinopril Cough    Losartan Cough    Morphine GI Disturbance and Nausea    Quetiapine Other (See Comments)     Behavioral disturbances - Patient got more agitated after taking seroquel at [facility]    Terbinafine Other (See Comments)     Hematuria      Past Medical History:   Diagnosis Date    DIABETES MELLITUS TYPE II-UNCOMPL 08/17/2005    Essential hypertension, benign     on meds, Stress Echo 1991 - normal, but suboptimal exertion    Other and unspecified hyperlipidemia     Other isolated or specific phobias     atenolol helps,  wants open MRI    Other specified causes of urethral stricture     cystoscopy 1997 some hematuria in past - benign    Severe dementia with anxiety (H) 04/12/2025    Squamous cell carcinoma      Past Surgical History:   Procedure Laterality Date    SURGICAL HISTORY OF -   1998    open cholecystectomy for gallstones s/p ERCP    SURGICAL HISTORY OF -   1997    cystoscopy    SURGICAL HISTORY OF -   11/1997    Urethral stricture     Family History   Problem Relation Age of Onset    Diabetes Mother     Hypertension Mother     Hypertension Father     Heart Disease Brother         MI x2, CABG, 1st MI at 49-49 yo.     Hypertension Brother     C.A.D. Brother     Alzheimer Disease Brother     Diabetes Sister          maybe    Coronary Artery Disease Brother     Heart Disease Brother     Melanoma No family hx of      Social History     Socioeconomic History    Marital status:    Tobacco Use    Smoking status: Former     Types: Cigarettes    Smokeless tobacco: Never   Substance and Sexual Activity    Alcohol use: Yes     Comment: rare    Drug use: No    Sexual activity: Not Currently   Other Topics Concern    Parent/sibling w/ CABG, MI or angioplasty before 65F 55M? Yes     Social Drivers of Health     Financial Resource Strain: Unknown (4/9/2025)    Financial Resource Strain     Within the past 12 months, have you or your family members you live with been unable to get utilities (heat, electricity) when it was really needed?: Patient unable to answer   Food Insecurity: Unknown (4/9/2025)    Food Insecurity     Within the past 12 months, did you worry that your food would run out before you got money to buy more?: Patient unable to answer     Within the past 12 months, did the food you bought just not last and you didn t have money to get more?: Patient unable to answer   Transportation Needs: Unknown (4/9/2025)    Transportation Needs     Within the past 12 months, has lack of transportation kept you from medical  "appointments, getting your medicines, non-medical meetings or appointments, work, or from getting things that you need?: Patient unable to answer   Social Connections: Socially Integrated (11/30/2024)    [facility]    Social Connections     Do you often feel lonely or isolated from those around you?: 0   Interpersonal Safety: Unknown (4/9/2025)    Interpersonal Safety     Do you feel physically and emotionally safe where you currently live?: Patient unable to answer     Within the past 12 months, have you been hit, slapped, kicked or otherwise physically hurt by someone?: Patient unable to answer     Within the past 12 months, have you been humiliated or emotionally abused in other ways by your partner or ex-partner?: Patient unable to answer   Housing Stability: Unknown (4/9/2025)    Housing Stability     Do you have housing? : Patient unable to answer     Are you worried about losing your housing?: Patient unable to answer       REVIEW OF SYSTEMS  =================  C: NEGATIVE for fever, chills, change in weight  I: NEGATIVE for worrisome rashes, moles or lesions  E/M: NEGATIVE for ear, mouth and throat problems  R: NEGATIVE for significant cough or SHORTNESS OF BREATH  CV:  NEGATIVE for chest pain, palpitations or peripheral edema  GI: NEGATIVE for nausea, abdominal pain, heartburn, or change in bowel habits  NEURO: NEGATIVE numbness/weakness  : see HPI  PSYCH: NEGATIVE depression/anxiety  LYmph: no new enlarged lymph nodes  Ortho: no new trauma/movements    Physical Exam:  /58 (BP Location: Right arm, Patient Position: Sitting, Cuff Size: Adult Regular)   Pulse 85   Temp 97.8  F (36.6  C) (Temporal)   Resp 17   Ht 1.676 m (5' 6\")   Wt 91.6 kg (202 lb)   SpO2 97%   BMI 32.60 kg/m    GENERAL: healthy, alert and no distress  EYES: Eyes grossly normal to inspection, conjunctivae and sclerae normal  RESP: no audible wheeze, cough, or visible cyanosis.  " No visible retractions or increased work of breathing.  Able to speak fully in complete sentences.  NEURO: Cranial nerves grossly intact, mentation intact and speech normal  PSYCH: mentation appears normal, affect normal/bright, judgement and insight intact, normal speech and appearance well-groomed  PVR: 134 ml (45 minutes prior)     Assessment/Plan:   Assessment & Plan  Benign prostatic hyperplasia with urinary obstruction:  - Finasteride and Tamsulosin is being used to manage prostate size and urinary symptoms. The patient is emptying the bladder well, as confirmed by a bladder scan. No current need for further intervention unless urinary retention recurs.  - Continue current medications with refills provided until at least July. Follow-up with primary urologist scheduled for July. Consider cystoscopy if severe symptoms develop.    Retention of urine:  - Previous issues with urinary retention have improved. The patient had difficulty emptying the bladder in December, requiring catheterization, but is now managing well.  - Monitor urinary symptoms and maintain current medication regimen. Follow-up with urologist in July.          RONI Gomez CNP    Consent was obtained from the patient to use an AI documentation tool in the creation of this note.  Voice recognition software was also used.  There may be associated transcribing errors.

## 2025-05-13 NOTE — PROGRESS NOTES
Bladder Scan performed. 134 ml maximum residual urine detected after 3 scans. MD informed      Capri Parish MA on 5/13/2025 at 12:57 PM